# Patient Record
Sex: MALE | Race: BLACK OR AFRICAN AMERICAN | NOT HISPANIC OR LATINO | Employment: PART TIME | URBAN - METROPOLITAN AREA
[De-identification: names, ages, dates, MRNs, and addresses within clinical notes are randomized per-mention and may not be internally consistent; named-entity substitution may affect disease eponyms.]

---

## 2018-11-05 ENCOUNTER — HOSPITAL ENCOUNTER (EMERGENCY)
Facility: HOSPITAL | Age: 28
Discharge: HOME | End: 2018-11-05
Attending: EMERGENCY MEDICINE
Payer: MEDICAID

## 2018-11-05 VITALS
SYSTOLIC BLOOD PRESSURE: 130 MMHG | RESPIRATION RATE: 17 BRPM | TEMPERATURE: 98 F | WEIGHT: 240 LBS | BODY MASS INDEX: 36.37 KG/M2 | HEART RATE: 73 BPM | OXYGEN SATURATION: 99 % | HEIGHT: 68 IN | DIASTOLIC BLOOD PRESSURE: 71 MMHG

## 2018-11-05 DIAGNOSIS — R07.89 CHEST PAIN, NON-CARDIAC: Primary | ICD-10-CM

## 2018-11-05 PROCEDURE — 93010 ELECTROCARDIOGRAM REPORT: CPT | Mod: ,,, | Performed by: INTERNAL MEDICINE

## 2018-11-05 PROCEDURE — 93005 ELECTROCARDIOGRAM TRACING: CPT

## 2018-11-05 PROCEDURE — 25000003 PHARM REV CODE 250: Performed by: EMERGENCY MEDICINE

## 2018-11-05 PROCEDURE — 99284 EMERGENCY DEPT VISIT MOD MDM: CPT | Performed by: EMERGENCY MEDICINE

## 2018-11-05 RX ORDER — IBUPROFEN 600 MG/1
600 TABLET ORAL EVERY 6 HOURS PRN
Qty: 20 TABLET | Refills: 0 | Status: SHIPPED | OUTPATIENT
Start: 2018-11-05

## 2018-11-05 RX ORDER — CYCLOBENZAPRINE HCL 10 MG
10 TABLET ORAL 3 TIMES DAILY PRN
Qty: 15 TABLET | Refills: 0 | Status: SHIPPED | OUTPATIENT
Start: 2018-11-05 | End: 2018-11-10

## 2018-11-05 RX ORDER — IBUPROFEN 600 MG/1
600 TABLET ORAL
Status: COMPLETED | OUTPATIENT
Start: 2018-11-05 | End: 2018-11-05

## 2018-11-05 RX ADMIN — IBUPROFEN 600 MG: 600 TABLET ORAL at 08:11

## 2018-11-05 NOTE — DISCHARGE INSTRUCTIONS
Please return immediately if you get worse or if new problems develop.  Rest.  Please follow-up with your primary care doctor this week.  Rest.  You may see the doctor above.  Rest.

## 2018-11-05 NOTE — ED PROVIDER NOTES
Encounter Date: 11/5/2018    SCRIBE #1 NOTE: I, Sudhir Segal, am scribing for, and in the presence of,  Felix Valenzuela MD. I have scribed the following portions of the note - Other sections scribed: HPI, ROS.       History     Chief Complaint   Patient presents with    Chest Pain     chest pain since yesterday. left sided; worse with activity and deep breathing. pain comes and goes; feels like muscle spasms. denies any sob, n/v.      CC: Chest Pain    HPI: This is a 28 y.o. M who has no PMHx who presents to the ED for emergent evaluation of acute, intermittent, atraumatic left sided CP that began yesterday. He describes the CP as a sharp pain. He has no associated symptoms. Chest pain is exacerbated with taking a deep breath and with lying down on the left side. There are no alleviating factors. Pt denies fever, chills, ear pain, eye pain, sore throat, cough, SOB, leg swelling, abdominal pain, nausea, vomiting diarrhea, dysuria, back pain, rash, arm or leg problems, headache, or tobacco use.      The history is provided by the patient. No  was used.     Review of patient's allergies indicates:  No Known Allergies  History reviewed. No pertinent past medical history.  History reviewed. No pertinent surgical history.  History reviewed. No pertinent family history.  Social History     Tobacco Use    Smoking status: Never Smoker    Smokeless tobacco: Never Used   Substance Use Topics    Alcohol use: Yes     Frequency: Never     Comment: occasionally    Drug use: No     Review of Systems   Constitutional: Negative for chills and fever.   HENT: Negative for ear pain and sore throat.    Eyes: Negative for pain.   Respiratory: Negative for cough and shortness of breath.    Cardiovascular: Positive for chest pain. Negative for leg swelling.   Gastrointestinal: Negative for abdominal pain, diarrhea, nausea and vomiting.   Genitourinary: Negative for dysuria.   Musculoskeletal: Negative for back  pain.        (-) arm or leg problems   Skin: Negative for rash.   Neurological: Negative for headaches.       Physical Exam     Initial Vitals [11/05/18 0756]   BP Pulse Resp Temp SpO2   127/83 72 18 98.6 °F (37 °C) 96 %      MAP       --         Physical Exam  The patient was examined specifically for the following:   General:No significant distress, Good color, Warm and dry. Head and neck:Scalp atraumatic, Neck supple. Neurological:Appropriate conversation, Gross motor deficits. Eyes:Conjugate gaze, Clear corneas. ENT: No epistaxis. Cardiac: Regular rate and rhythm, Grossly normal heart tones. Pulmonary: Wheezing, Rales. Gastrointestinal: Abdominal tenderness, Abdominal distention. Musculoskeletal: Extremity deformity, Apparent pain with range of motion of the joints. Skin: Rash.   The findings on examination were normal.  The lungs are clear.  The heart tones are normal.  The abdomen is soft.  There is no evidence of respiratory distress or cardiopulmonary strain.  The chest is not tender there is no rash on the left chest.  The lungs are clear with equal breath sounds bilaterally.  Heart tones are normal.  The patient has regular rate and rhythm.  ED Course   Procedures  Labs Reviewed - No data to display  EKG Readings: (Independently Interpreted)   This patient is in a normal sinus rhythm with a heart rate of 74.  The p.r. QRS and QT intervals are normal.  There is some nonspecific ST segment changes that are most likely early repolarization.  There is no evidence of acute myocardial infarction or malignant arrhythmia.       Imaging Results          X-Ray Chest AP Portable (Final result)  Result time 11/05/18 08:36:43    Final result by Norberto Bob MD (11/05/18 08:36:43)                 Impression:      No acute intrathoracic processes.      Electronically signed by: Norberto Bob MD  Date:    11/05/2018  Time:    08:36             Narrative:    EXAMINATION:  XR CHEST AP PORTABLE    CLINICAL HISTORY:  chest  pain;    TECHNIQUE:  Single frontal view of the chest was performed.    COMPARISON:  None    FINDINGS:  Cardiomediastinal silhouette is within normal limits.  There is no tracheal abnormalities.  There are no acute lobar consolidations or pneumothorax or pulmonary vascular congestion or pleural effusions.  There are cardiac monitoring leads over the chest.  The visualized bony thorax is unremarkable.                                            Scribe Attestation:   Scribe #1: I performed the above scribed service and the documentation accurately describes the services I performed. I attest to the accuracy of the note.    Attending Attestation:           Physician Attestation for Scribe:  Physician Attestation Statement for Scribe #1: I, Felix Valenzuela MD, reviewed documentation, as scribed by Sudhir Segal in my presence, and it is both accurate and complete.                    Clinical Impression:   The encounter diagnosis was Chest pain, non-cardiac.                             Felix Valenzuela MD  11/07/18 0881

## 2018-11-05 NOTE — ED TRIAGE NOTES
"Pt arrived to the ED via personal transport with c/o chest pain. Pt states "pain started on yesterday and hurts more with movement like when he raises his arms up". Pt denies SOB, dizziness, N/V. Pt reports pain 9/10 on pain scale. Pt denies taking anything to relieve pain.  "

## 2019-07-03 ENCOUNTER — HOSPITAL ENCOUNTER (EMERGENCY)
Facility: HOSPITAL | Age: 29
Discharge: HOME | End: 2019-07-03
Attending: EMERGENCY MEDICINE
Payer: MEDICAID

## 2019-07-03 VITALS
SYSTOLIC BLOOD PRESSURE: 139 MMHG | TEMPERATURE: 98 F | WEIGHT: 240 LBS | DIASTOLIC BLOOD PRESSURE: 74 MMHG | BODY MASS INDEX: 35.55 KG/M2 | OXYGEN SATURATION: 97 % | RESPIRATION RATE: 18 BRPM | HEART RATE: 80 BPM | HEIGHT: 69 IN

## 2019-07-03 DIAGNOSIS — H60.502 ACUTE OTITIS EXTERNA OF LEFT EAR, UNSPECIFIED TYPE: Primary | ICD-10-CM

## 2019-07-03 DIAGNOSIS — H66.92 ACUTE OTITIS MEDIA, LEFT: ICD-10-CM

## 2019-07-03 PROCEDURE — 99284 EMERGENCY DEPT VISIT MOD MDM: CPT | Mod: 25

## 2019-07-03 PROCEDURE — 25000003 PHARM REV CODE 250: Performed by: NURSE PRACTITIONER

## 2019-07-03 RX ORDER — AMOXICILLIN 875 MG/1
875 TABLET, FILM COATED ORAL 2 TIMES DAILY
Qty: 14 TABLET | Refills: 0 | OUTPATIENT
Start: 2019-07-03 | End: 2020-01-08

## 2019-07-03 RX ORDER — NEOMYCIN SULFATE, POLYMYXIN B SULFATE AND HYDROCORTISONE 10; 3.5; 1 MG/ML; MG/ML; [USP'U]/ML
4 SUSPENSION/ DROPS AURICULAR (OTIC) 3 TIMES DAILY
Qty: 6 ML | Refills: 0 | Status: SHIPPED | OUTPATIENT
Start: 2019-07-03 | End: 2019-07-10

## 2019-07-03 RX ORDER — AMOXICILLIN 250 MG/1
1000 CAPSULE ORAL
Status: COMPLETED | OUTPATIENT
Start: 2019-07-03 | End: 2019-07-03

## 2019-07-03 RX ORDER — NAPROXEN 500 MG/1
500 TABLET ORAL
Status: COMPLETED | OUTPATIENT
Start: 2019-07-03 | End: 2019-07-03

## 2019-07-03 RX ORDER — NEOMYCIN SULFATE, POLYMYXIN B SULFATE AND HYDROCORTISONE 10; 3.5; 1 MG/ML; MG/ML; [USP'U]/ML
4 SUSPENSION/ DROPS AURICULAR (OTIC)
Status: COMPLETED | OUTPATIENT
Start: 2019-07-03 | End: 2019-07-03

## 2019-07-03 RX ADMIN — AMOXICILLIN 1000 MG: 250 CAPSULE ORAL at 04:07

## 2019-07-03 RX ADMIN — NAPROXEN 500 MG: 500 TABLET ORAL at 04:07

## 2019-07-03 RX ADMIN — NEOMYCIN SULFATE, POLYMYXIN B SULFATE AND HYDROCORTISONE 4 DROP: 10; 3.5; 1 SUSPENSION/ DROPS AURICULAR (OTIC) at 04:07

## 2019-07-03 NOTE — DISCHARGE INSTRUCTIONS
Please return to the Emergency Department for any new or worsening symptoms including: worsening or changes in pain, fever, chest pain, shortness of breath, loss of consciousness, dizziness, weakness, or any other concerns.     Please follow up with your Primary Care Doctor in 2-3 days for a recheck of your symptoms. If you do not have one, you may contact the one listed on your discharge paperwork or you may also call the [facility] Appointment Desk at [phone] to schedule an appointment with one.     Please take all medication as prescribed. Tylenol or Ibuprofen as needed for pain.

## 2019-07-03 NOTE — ED PROVIDER NOTES
Encounter Date: 7/3/2019       History     Chief Complaint   Patient presents with    Otalgia     reports having left ear pain x1 week denies draikaterin     CC: Left Ear Pain    HPI: Roly Candelaria, a 29 y.o. male presents to the ED a 1 week history of gradually worsening left ear pain. He reports swimming just prior to the onset of ear pain approximately 1 week ago. He reports the pain is constant. Worse with movement of the ear. He occasionally uses QTips. No recent antibiotic use. He reports using OTC treatments with no relif of symptoms. No drainage from the ear, fevers, coughs, or right ear pain.       The history is provided by the patient. No  was used.     Review of patient's allergies indicates:  No Known Allergies  History reviewed. No pertinent past medical history.  History reviewed. No pertinent surgical history.  History reviewed. No pertinent family history.  Social History     Tobacco Use    Smoking status: Never Smoker    Smokeless tobacco: Never Used   Substance Use Topics    Alcohol use: Yes     Frequency: Never     Comment: occasionally    Drug use: No     Review of Systems   Constitutional: Negative for fever.   HENT: Positive for ear pain. Negative for sore throat.         (-) Ear Drainage   Musculoskeletal: Negative for neck pain and neck stiffness.   Skin: Negative for rash and wound.   Neurological: Negative for headaches.   Psychiatric/Behavioral: Negative for confusion.       Physical Exam     Initial Vitals [07/03/19 1556]   BP Pulse Resp Temp SpO2   139/74 80 18 98.1 °F (36.7 °C) 97 %      MAP       --         Physical Exam    Nursing note and vitals reviewed.  Constitutional: He appears well-developed and well-nourished. He is not diaphoretic. He is cooperative.  Non-toxic appearance. He does not have a sickly appearance. He does not appear ill. No distress.   HENT:   Head: Normocephalic and atraumatic.   Right Ear: Tympanic membrane and external ear normal.  No drainage. No mastoid tenderness. Tympanic membrane is not erythematous.   Left Ear: No drainage. No mastoid tenderness. Tympanic membrane is erythematous.   Mouth/Throat: Oropharynx is clear and moist.   Left Ear: Canal erythematous, with swelling and dry flaky discharge. Canal swelling greatest near the proximal canal. TM visualized with insertion of the speculum into the canal. and is erythematous and injected. Tragal tenderness and pain with movement. Right Ear: Normal canal and TM. No tragal or movement pain.    Eyes: Conjunctivae and EOM are normal.   Neck: Trachea normal, normal range of motion and phonation normal. Neck supple. Normal range of motion present. No neck rigidity.   Cardiovascular: Normal rate and regular rhythm.   Pulmonary/Chest: No tachypnea and no bradypnea. No respiratory distress. He has no wheezes. He has no rhonchi. He has no rales.   Musculoskeletal: Normal range of motion.   Lymphadenopathy:     He has no cervical adenopathy.        Right cervical: No superficial cervical adenopathy present.       Left cervical: No superficial cervical adenopathy present.   Neurological: He is alert and oriented to person, place, and time. GCS eye subscore is 4. GCS verbal subscore is 5. GCS motor subscore is 6.   Skin: Skin is warm and dry. No rash noted.         ED Course   Procedures  Labs Reviewed - No data to display       Imaging Results    None                APC / Resident Notes:   This is an evaluation of a 29 y.o. male that presents to the Emergency Department for Left Ear Pain. The patient is a non-toxic, afebrile, and well appearing male. On physical exam, there is left canal edema, erythema, and discharge. The TM is intact and has erythema. There is tragal pain and movement pain. There is no no mastoid tenderness or erythema.  Right ear with no TM or canal erythema. There is no tragal or canal tenderness\pain and no mastoid tenderness or erythema. Vital Signs Reassuring. Given the canal  erythema with swelling and TM erythema, will cover for OE and OM.     Given the above findings, my overall impression is otitis media/externa. Given the above findings, I do not think the patient has meningitis, mastoiditis, perforated TM, foreign body, or systemic bacterial infection.    The patient will be discharged home with Amoxil and Cortisporin. Additional home care recommendations include OE discharge instructions. The diagnosis, treatment plan, instructions for follow-up and reevaluation with his PCP as well as ED return precautions have been discussed with the patient and he has verbalized an understanding of the information. All questions or concerns have been addressed. ESTHER Ham, SHANELLP-C                  Clinical Impression:       ICD-10-CM ICD-9-CM   1. Acute otitis externa of left ear, unspecified type H60.502 380.10   2. Acute otitis media, left H66.92 382.9         Disposition:   Disposition: Discharged  Condition: Stable                        JAS Aldana  07/03/19 1626

## 2019-07-03 NOTE — ED TRIAGE NOTES
Pt comes to ED with left ear pain.  Pt reports that the pain started about a week ago.  Pt reports attempting over the counter remedies, and it didn't work.  At this time, pt is alert and oriented x4, VSS and he appears to be in no acute distress.

## 2020-01-08 ENCOUNTER — HOSPITAL ENCOUNTER (EMERGENCY)
Facility: HOSPITAL | Age: 30
Discharge: HOME | End: 2020-01-08
Attending: EMERGENCY MEDICINE
Payer: MEDICAID

## 2020-01-08 VITALS
HEART RATE: 84 BPM | WEIGHT: 240 LBS | OXYGEN SATURATION: 98 % | RESPIRATION RATE: 18 BRPM | TEMPERATURE: 97 F | BODY MASS INDEX: 35.55 KG/M2 | SYSTOLIC BLOOD PRESSURE: 124 MMHG | HEIGHT: 69 IN | DIASTOLIC BLOOD PRESSURE: 80 MMHG

## 2020-01-08 DIAGNOSIS — H66.91 RIGHT OTITIS MEDIA, UNSPECIFIED OTITIS MEDIA TYPE: Primary | ICD-10-CM

## 2020-01-08 PROCEDURE — 99283 EMERGENCY DEPT VISIT LOW MDM: CPT | Mod: 25

## 2020-01-08 PROCEDURE — 25000003 PHARM REV CODE 250: Performed by: NURSE PRACTITIONER

## 2020-01-08 PROCEDURE — 69210 REMOVE IMPACTED EAR WAX UNI: CPT | Mod: RT

## 2020-01-08 RX ORDER — AMOXICILLIN AND CLAVULANATE POTASSIUM 875; 125 MG/1; MG/1
1 TABLET, FILM COATED ORAL 2 TIMES DAILY
Qty: 14 TABLET | Refills: 0 | Status: SHIPPED | OUTPATIENT
Start: 2020-01-09 | End: 2020-01-16

## 2020-01-08 RX ORDER — AMOXICILLIN AND CLAVULANATE POTASSIUM 875; 125 MG/1; MG/1
1 TABLET, FILM COATED ORAL
Status: COMPLETED | OUTPATIENT
Start: 2020-01-08 | End: 2020-01-08

## 2020-01-08 RX ORDER — IBUPROFEN 400 MG/1
800 TABLET ORAL
Status: COMPLETED | OUTPATIENT
Start: 2020-01-08 | End: 2020-01-08

## 2020-01-08 RX ADMIN — AMOXICILLIN AND CLAVULANATE POTASSIUM 1 TABLET: 875; 125 TABLET, FILM COATED ORAL at 08:01

## 2020-01-08 RX ADMIN — IBUPROFEN 800 MG: 400 TABLET, FILM COATED ORAL at 08:01

## 2020-01-08 RX ADMIN — CARBAMIDE PEROXIDE 6.5% 5 DROP: 6.5 LIQUID AURICULAR (OTIC) at 08:01

## 2020-01-09 NOTE — DISCHARGE INSTRUCTIONS

## 2020-01-09 NOTE — ED PROVIDER NOTES
Encounter Date: 1/8/2020    SCRIBE #1 NOTE: I, Lilly Lane, am scribing for, and in the presence of,  Radha Marinelli NP. I have scribed the following portions of the note - Other sections scribed: HPI and ROS.       History     Chief Complaint   Patient presents with    Otalgia     Patient reports right ear pain X 2 weeks. Patient reports being seen at urgent care and receiving ear drops. Patient states pain has increased with usage. Rates pain 10/10     CC: Otalgia    HPI: This is a 30 y.o. male with no PMHx. He presents to the Emergency Department with a cc of right otalgia x2 weeks. The patient was initially seen at an urgent care, 2 days ago, for the complaint where he was prescribed unspecified eardrops. He reports to the ED today, due to the ongoing complaint with no relief from the ear drops. The patient reports associated rhinorrhea and cough. He denies any fever, chills, or sore throat. Tylenol was taken in attempts to alleviate the complaint, with no relief. There were no worsening factors reported. Patient reports no prior history of similar symptoms. NKDA.        The history is provided by the patient.     Review of patient's allergies indicates:  No Known Allergies  History reviewed. No pertinent past medical history.  History reviewed. No pertinent surgical history.  History reviewed. No pertinent family history.  Social History     Tobacco Use    Smoking status: Never Smoker    Smokeless tobacco: Never Used   Substance Use Topics    Alcohol use: Yes     Frequency: Never     Comment: occasionally    Drug use: No     Review of Systems   Constitutional: Negative for chills and fever.   HENT: Positive for ear pain and rhinorrhea. Negative for congestion and sore throat.    Eyes: Negative for visual disturbance.   Respiratory: Positive for cough. Negative for shortness of breath.    Cardiovascular: Negative for chest pain.   Gastrointestinal: Negative for abdominal pain, diarrhea, nausea and  vomiting.   Genitourinary: Negative for dysuria and hematuria.   Musculoskeletal: Negative for back pain and neck pain.   Skin: Negative for rash.   Neurological: Negative for headaches.   Psychiatric/Behavioral: Negative for confusion.       Physical Exam     Initial Vitals [01/08/20 1957]   BP Pulse Resp Temp SpO2   (!) 140/77 82 16 98.4 °F (36.9 °C) 99 %      MAP       --         Physical Exam    Vitals reviewed.  Constitutional: He appears well-developed and well-nourished. He is not diaphoretic.  Non-toxic appearance. He does not have a sickly appearance. He does not appear ill. No distress.   HENT:   Head: Normocephalic and atraumatic.   Right Ear: External ear and ear canal normal. Tympanic membrane is erythematous and bulging. A middle ear effusion is present.   Left Ear: Hearing, tympanic membrane, external ear and ear canal normal.   Nose: Nose normal.   Mouth/Throat: Uvula is midline and oropharynx is clear and moist. No oropharyngeal exudate.   Right TM is erythematous and bulging.   Eyes: Conjunctivae and EOM are normal. Pupils are equal, round, and reactive to light. Right eye exhibits no discharge. Left eye exhibits no discharge.   Neck: Normal range of motion. Neck supple.   Cardiovascular: Normal rate, regular rhythm, normal heart sounds and intact distal pulses.   Pulmonary/Chest: No respiratory distress.   Musculoskeletal: Normal range of motion.   Neurological: He is alert and oriented to person, place, and time. GCS eye subscore is 4. GCS verbal subscore is 5. GCS motor subscore is 6.   Skin: Skin is warm, dry and intact. No rash noted. No erythema.   Psychiatric: He has a normal mood and affect. Thought content normal.         ED Course   Ear Wax Removal  Date/Time: 1/8/2020 8:58 PM  Performed by: Radha Marinelli NP  Authorized by: Darian Meyer MD   Ceruminolytics applied prior to the procedure.  Medication Used: Debrox.  Location details: right ear  Procedure type: curette Cerumen  Removal Results: Cerumen completely removed.  Patient tolerance: Patient tolerated the procedure well with no immediate complications        Labs Reviewed - No data to display       Imaging Results    None          Medical Decision Making:   ED Management:  This is an evaluation of a 30 y.o. male that presents to the Emergency Department for Right ear pain. The patient is a non-toxic, afebrile, and well appearing male. On physical exam, unable to visualize right TM due to cerumen impaction.  Cerumen was removed per the procedure note without difficulty.  Right TM is erythematous and bulging with a middle ear effusion.. There is no tragal or canal tenderness\pain and no mastoid tenderness or erythema.      Vital Signs Reassuring.    Given the above findings, my overall impression is otitis media. Given the above findings, I do not think the patient has meningitis, OE, mastoiditis, perforated TM, foreign body, or systemic bacterial infection.    The patient will be discharged home with Augmentin. Additional home care recommendations include ibuprofen and Tylenol for pain. The diagnosis, treatment plan, instructions for follow-up and reevaluation with his PCP as well as ED return precautions have been discussed with the patient and he has verbalized an understanding of the information. All questions or concerns have been addressed.               Scribe Attestation:   Scribe #1: I performed the above scribed service and the documentation accurately describes the services I performed. I attest to the accuracy of the note.                          Clinical Impression:     1. Right otitis media, unspecified otitis media type            Disposition:   Disposition: Discharged  Condition: Stable        Scribe attestation: IWILLARD, personally performed the services described in this documentation. All medical record entries made by the scribe were at my direction and in my presence.  I have reviewed the chart and agree  that the record reflects my personal performance and is accurate and complete.               Radha Marinelli, MATTHEW  01/08/20 2059

## 2020-01-09 NOTE — ED TRIAGE NOTES
Pt presents to ED with c/o right ear ache x 2 weeks. Pt reports being seen at local urgent care last week and dx with swimmer's ear; reports being prescribed ear drops with no relief. NAD noted.

## 2022-01-11 ENCOUNTER — LAB VISIT (OUTPATIENT)
Dept: PRIMARY CARE CLINIC | Facility: CLINIC | Age: 32
End: 2022-01-11
Payer: MEDICAID

## 2022-01-11 DIAGNOSIS — Z20.822 CONTACT WITH AND (SUSPECTED) EXPOSURE TO COVID-19: ICD-10-CM

## 2022-01-11 LAB
CTP QC/QA: YES
SARS-COV-2 AG RESP QL IA.RAPID: NEGATIVE

## 2022-01-11 PROCEDURE — 87811 SARS-COV-2 COVID19 W/OPTIC: CPT

## 2022-01-12 ENCOUNTER — OFFICE VISIT (OUTPATIENT)
Dept: URGENT CARE | Facility: CLINIC | Age: 32
End: 2022-01-12
Payer: MEDICAID

## 2022-01-12 VITALS
HEART RATE: 67 BPM | BODY MASS INDEX: 35.55 KG/M2 | SYSTOLIC BLOOD PRESSURE: 141 MMHG | DIASTOLIC BLOOD PRESSURE: 70 MMHG | HEIGHT: 69 IN | OXYGEN SATURATION: 97 % | TEMPERATURE: 98 F | WEIGHT: 240 LBS | RESPIRATION RATE: 15 BRPM

## 2022-01-12 DIAGNOSIS — Z20.822 LAB TEST NEGATIVE FOR COVID-19 VIRUS: ICD-10-CM

## 2022-01-12 DIAGNOSIS — Z76.89 ENCOUNTER TO ESTABLISH CARE: ICD-10-CM

## 2022-01-12 DIAGNOSIS — J34.9 SINUS PROBLEM: ICD-10-CM

## 2022-01-12 DIAGNOSIS — J30.9 ALLERGIC RHINITIS, UNSPECIFIED SEASONALITY, UNSPECIFIED TRIGGER: Primary | ICD-10-CM

## 2022-01-12 LAB
CTP QC/QA: YES
SARS-COV-2 RDRP RESP QL NAA+PROBE: NEGATIVE

## 2022-01-12 PROCEDURE — 3077F PR MOST RECENT SYSTOLIC BLOOD PRESSURE >= 140 MM HG: ICD-10-PCS | Mod: CPTII,S$GLB,, | Performed by: FAMILY MEDICINE

## 2022-01-12 PROCEDURE — 3077F SYST BP >= 140 MM HG: CPT | Mod: CPTII,S$GLB,, | Performed by: FAMILY MEDICINE

## 2022-01-12 PROCEDURE — 3008F BODY MASS INDEX DOCD: CPT | Mod: CPTII,S$GLB,, | Performed by: FAMILY MEDICINE

## 2022-01-12 PROCEDURE — 1160F RVW MEDS BY RX/DR IN RCRD: CPT | Mod: CPTII,S$GLB,, | Performed by: FAMILY MEDICINE

## 2022-01-12 PROCEDURE — 3078F DIAST BP <80 MM HG: CPT | Mod: CPTII,S$GLB,, | Performed by: FAMILY MEDICINE

## 2022-01-12 PROCEDURE — U0002: ICD-10-PCS | Mod: QW,S$GLB,, | Performed by: FAMILY MEDICINE

## 2022-01-12 PROCEDURE — 99203 PR OFFICE/OUTPT VISIT, NEW, LEVL III, 30-44 MIN: ICD-10-PCS | Mod: S$GLB,,, | Performed by: FAMILY MEDICINE

## 2022-01-12 PROCEDURE — 1159F PR MEDICATION LIST DOCUMENTED IN MEDICAL RECORD: ICD-10-PCS | Mod: CPTII,S$GLB,, | Performed by: FAMILY MEDICINE

## 2022-01-12 PROCEDURE — 1160F PR REVIEW ALL MEDS BY PRESCRIBER/CLIN PHARMACIST DOCUMENTED: ICD-10-PCS | Mod: CPTII,S$GLB,, | Performed by: FAMILY MEDICINE

## 2022-01-12 PROCEDURE — 3078F PR MOST RECENT DIASTOLIC BLOOD PRESSURE < 80 MM HG: ICD-10-PCS | Mod: CPTII,S$GLB,, | Performed by: FAMILY MEDICINE

## 2022-01-12 PROCEDURE — 1159F MED LIST DOCD IN RCRD: CPT | Mod: CPTII,S$GLB,, | Performed by: FAMILY MEDICINE

## 2022-01-12 PROCEDURE — 99203 OFFICE O/P NEW LOW 30 MIN: CPT | Mod: S$GLB,,, | Performed by: FAMILY MEDICINE

## 2022-01-12 PROCEDURE — 3008F PR BODY MASS INDEX (BMI) DOCUMENTED: ICD-10-PCS | Mod: CPTII,S$GLB,, | Performed by: FAMILY MEDICINE

## 2022-01-12 PROCEDURE — U0002 COVID-19 LAB TEST NON-CDC: HCPCS | Mod: QW,S$GLB,, | Performed by: FAMILY MEDICINE

## 2022-01-12 RX ORDER — CETIRIZINE HYDROCHLORIDE 10 MG/1
10 TABLET ORAL DAILY
Qty: 30 TABLET | Refills: 0 | Status: SHIPPED | OUTPATIENT
Start: 2022-01-12 | End: 2022-02-11

## 2022-01-12 RX ORDER — OLOPATADINE HYDROCHLORIDE 2 MG/ML
1 SOLUTION/ DROPS OPHTHALMIC DAILY
Qty: 2 ML | Refills: 0 | Status: SHIPPED | OUTPATIENT
Start: 2022-01-12 | End: 2023-01-12

## 2022-01-12 RX ORDER — FLUTICASONE PROPIONATE 50 MCG
1 SPRAY, SUSPENSION (ML) NASAL DAILY
Qty: 16 G | Refills: 0 | Status: SHIPPED | OUTPATIENT
Start: 2022-01-12

## 2022-01-12 NOTE — PATIENT INSTRUCTIONS
General Discharge Instructions   PLEASE READ YOUR DISCHARGE INSTRUCTIONS ENTIRELY AS IT CONTAINS IMPORTANT INFORMATION.  If you were prescribed a narcotic or controlled medication, do not drive or operate heavy equipment or machinery while taking these medications.  If you were prescribed antibiotics, please take them to completion.  You must understand that you've received an Urgent Care treatment only and that you may be released before all your medical problems are known or treated. You, the patient, will arrange for follow up care as instructed.    OVER THE COUNTER RECOMMENDATIONS/SUGGESTIONS.    Make sure to stay well hydrated.    Use Nasal Saline to mechanically move any post nasal drip from your eustachian tube or from the back of your throat.    Use warm salt water gargles to ease your throat pain. Warm salt water gargles as needed for sore throat- 1/2 tsp salt to 1 cup warm water, gargle as desired.    Use an antihistamine such as Claritin, Zyrtec or Allegra to dry you out.    Use pseudoephedrine (behind the counter) to decongest. Pseudoephedrine 30 mg up to 240 mg /day. It can raise your blood pressure and give you palpitations.    Use mucinex (guaifenesin) to break up mucous up to 2400mg/day to loosen any mucous.    The mucinex DM pill has a cough suppressant that can be sedating. It can be used at night to stop the tickle at the back of your throat.    You can use Mucinex D (it has guaifenesin and a high dose of pseudoephedrine) in the mornings to help decongest.    Use Afrin in each nare for no longer than 3 days, as it is addictive. It can also dry out your mucous membranes and cause elevated blood pressure. This is especially useful if you are flying.    Use Flonase 1-2 sprays/nostril per day. It is a local acting steroid nasal spray, if you develop a bloody nose, stop using the medication immediately.    Sometimes Nyquil at night is beneficial to help you get some rest, however it is sedating and it  does have an antihistamine, and tylenol.    Honey is a natural cough suppressant that can be used.    Tylenol up to 4,000 mg a day is safe for short periods and can be used for body aches, pain, and fever. However in high doses and prolonged use it can cause liver irritation.    Ibuprofen is a non-steroidal anti-inflammatory that can be used for body aches, pain, and fever.However it can also cause stomach irritation if over used.     Follow up with your PCP or specialty clinic as instructed in the next 2-3 days if not improved or as needed. You can call [phone] to schedule an appointment with appropriate provider.      If you condition worsens, we recommend that you receive another evaluation at the emergency room immediately or contact your primary medical clinic's after hours call service to discuss your concerns.      Please return here or go to the Emergency Department for any concerns or worsening condition.   You can also call [phone] to schedule an appointment with the appropriate provider.    Please return here or go to the Emergency Department for any concerns or worsening of condition.    Thank you for choosing [facility]!    Our goal in the Urgent Care is to always provide outstanding medical care. You may receive a survey by mail or e-mail in the next week regarding your experience today. We would greatly appreciate you completing and returning the survey. Your feedback provides us with a way to recognize our staff who provide very good care, and it helps us learn how to improve when your experience was below our aspiration of excellence.      We appreciate you trusting us with your medical care. We hope you feel better soon. We will be happy to take care of you for all of your future medical needs.    Sincerely,    GREGG Ram  NEGATIVE COVID TEST  -You have tested negative for COVID-19 today.  If you did not have a close exposure (as defined below) you can return to  "your normal daily activities to include social distancing, wearing a mask and frequent handwashing.  -A "close exposure" is defined as anyone who has had an exposure (masked or unmasked) to a known COVID -19 positive person within 6 ft for longer than 15 minutes. If your exposure meets this definition, you are required by CDC guidelines to quarantine for at least 7-10 days from time of exposure.  -The CDC states that a test can be performed for an asymptomatic patient (someone who does not have any symptoms) after a close exposure, and that a test should be done if you develop symptoms after a close exposure as described above.  -Specifically, you can test at day 5 or later if asymptomatic in order to get released from quarantine on day 7 or later.  If you develop symptoms sooner, you should test when your symptoms start.  -If you developed symptoms since the exposure, and your test was negative today and less than 5 days from your exposure, you still have to quarantine for 7-10 days from the date of the exposure.  -The 7-10 day quarantine begins from the day you were exposed, not the day of your test.  For example, if your exposure was on a Monday, and you waited until Friday of the same week to get tested and it was negative, your 7-10 day quarantine begins from that Monday, not the Friday you tested negative.  -Please note, if you decide to test as an asymptomatic during your quarantine and you are positive, you will be restarting your quarantine and moving from a possible 10 day quarantine (if you do not test), to a 11 day or greater quarantine.  When to Seek Emergency Medical Attention  Look for emergency warning signs* for COVID-19. If someone is showing any of these signs, seek emergency medical care immediately:    Trouble breathing  Persistent pain or pressure in the chest  New confusion  Inability to wake or stay awake  Pale, gray, or blue-colored skin, lips, or nail beds, depending on skin tone  *This " "list is not all possible symptoms. Please call your medical provider for any other symptoms that are severe or concerning to you.    Call 911 or call ahead to your local emergency facility: Notify the  that you are seeking care for someone who has or may have COVID-19.  Patient Education       Seasonal Allergies in Adults   The Basics   Written by the doctors and editors at [facility]   What are seasonal allergies? -- Seasonal allergies are a group of conditions that can cause sneezing, a stuffy or runny nose, and itchy eyes. Seasonal allergies are sometimes called "hay fever."  Symptoms occur only at certain times of the year. Most seasonal allergies are caused by:  · Pollens from trees, grasses, or weeds (figure 1)  · Mold spores, which grow when the weather is humid, wet, or damp  Normally, people breathe in these substances without a problem. When a person has a seasonal allergy, their immune system acts as if the substance is harmful to the body. This causes symptoms.  Many people first get seasonal allergies when they are children or young adults. Seasonal allergies are lifelong, but symptoms can get better or worse over time. Seasonal allergies sometimes run in families.  Some people have symptoms like those of seasonal allergies, but their symptoms last all year. Year-round symptoms are usually caused by:  · Insects, such as dust mites and cockroaches  · Animals, such as cats and dogs  · Mold spores  What are the symptoms of seasonal allergies? -- Symptoms of seasonal allergies can include:  · Stuffy nose, runny nose, or sneezing a lot  · Itchy or red eyes  · Sore throat, or itching of the throat or ears  · Waking up at night or trouble sleeping, which can lead to feeling tired during the day  Is there a test for seasonal allergies? -- Yes. Your doctor will ask about your symptoms and do an exam. They might order other tests, such as allergy skin testing, which can help the doctor figure out what you " are allergic to. During a skin test, a doctor will put a drop of the substance you might be allergic to on your skin, and make a tiny prick in the skin. Then, they will watch your skin to see if it turns red and bumpy.  How are seasonal allergies treated? -- People with seasonal allergies might use one or more of the following treatments to help reduce their symptoms:  · Nose rinses - Rinsing out the nose with salt water cleans the inside of the nose and gets rid of pollen in the nose. Different devices can be used to rinse the nose.  · Steroid nose sprays - Doctors often recommend these sprays first, because they are the best treatment for stuffy nose. Many of these sprays are available without a prescription. (Steroid nose sprays do not contain the same steroids that some athletes take illegally). Steroid nose sprays work best if you use them every day, and it can take a few days for them to work fully. Steroid nose sprays are more effective than other allergy medicines for stuffy nose and post-nasal drip (which is when mucus runs down the back of your throat).  · Antihistamines - These medicines help stop itching, sneezing, and runny nose symptoms. They don't treat stuffy nose as well as steroid nose sprays. Some antihistamines can make people feel tired.  · Antihistamine eye drops - These medicines are available without a prescription. They can help with eyes that feel itchy or gritty.  · Decongestants - These medicines can reduce stuffy nose symptoms. People with certain health problems, such as high blood pressure, should not take decongestants. Also, people should not use decongestant nose sprays for more than 3 days in a row. Using these nose sprays for more than 3 days in a row can make symptoms worse.   · Allergy shots - Some people with seasonal allergies choose to get allergy shots. Usually, allergy shots are given every week or month by an allergy doctor. They contain tiny amounts of allergens, such as  pollen. Many people find that this treatment reduces their symptoms, but it can take months to work.  · Allergy pills (under the tongue) - For some types of pollen allergies, there are pills that work much like allergy shots. These pills need to be prescribed by a doctor. They are made to dissolve under the tongue. They are taken every day for several months of the year.  Talk with your doctor or nurse about the benefits and downsides of the different treatments. The right treatment for you will depend a lot on your symptoms and other health problems. It is also important to talk with your doctor or nurse about when and how to use your medicines.  Can seasonal allergy symptoms be prevented? -- Yes. If you get symptoms at the same time every year, talk with your doctor or nurse. Some people can prevent symptoms by starting their medicine a week or 2 before that time of the year.  You can also help prevent symptoms by avoiding the things you are allergic to. For example, people who are allergic to pollen can:  · Stay inside during the times of the year when they have symptoms  · Keep car and house windows closed, and use air conditioning instead  · Take a shower before bed to rinse pollen off their hair and skin  · Wear a dust mask if they need to be outside  What if I want to get pregnant? -- If you want to get pregnant, talk with your doctor about which medicines are safe for pregnant women to take. Seasonal allergy symptoms can get worse, get better, or stay the same in pregnant women.  All topics are updated as new evidence becomes available and our peer review process is complete.  This topic retrieved from [email or web address] on: Sep 21, 2021.  Topic 41844 Version 12.0  Release: 29.4.2 - C29.263  © 2021 UpToDate, Inc. and/or its affiliates. All rights reserved.  figure 1: Common causes of seasonal allergies     Graphic 88282 Version 3.0    Consumer Information Use and Disclaimer   This information is not specific medical  advice and does not replace information you receive from your health care provider. This is only a brief summary of general information. It does NOT include all information about conditions, illnesses, injuries, tests, procedures, treatments, therapies, discharge instructions or life-style choices that may apply to you. You must talk with your health care provider for complete information about your health and treatment options. This information should not be used to decide whether or not to accept your health care provider's advice, instructions or recommendations. Only your health care provider has the knowledge and training to provide advice that is right for you. The use of this information is governed by the [employer] End User License Agreement, available at https://www.[employer]To/en/solutions/[employer]/about/tara.The use of [employer] content is governed by the [employer] Terms of Use. ©2021 UpToDate, Inc. All rights reserved.  Copyright   © 2021 Up[employer]Date, Inc. and/or its affiliates. All rights reserved.        Access Navigator team who handles Medicaid referrals INTO [facility]. The main line for that team (for your referrals into [facility]) is [phone] Access Line - helps Medicaid patients to find Medicaid care OUTSIDE of [facility]. Medicaid Access Line contact is: [phone]?  [email or web address].[address] [place] [email or web address] - [phone] - a community line that can help refer

## 2022-01-12 NOTE — PROGRESS NOTES
"Subjective:       Patient ID: Roly Candelaria is a 32 y.o. male.    Vitals:  height is 5' 9" (1.753 m) and weight is 108.9 kg (240 lb). His temporal temperature is 98.2 °F (36.8 °C). His blood pressure is 141/70 (abnormal) and his pulse is 67. His respiration is 15 and oxygen saturation is 97%.     Chief Complaint: Sinus Problem    Pt has been having symptoms for the pass few weeks. Pt did take medications with a mild relief. Pt is Covid vaccinated.   Provider note begins below:  Pt reports he has been having itchy eyes, and runny nose since around NICKIE. Fully cv vax with booster. No fever or chills. No cough or SOB. No GI related symptoms, including, N/v/D or constipation. No anosmia or ageusia. He has been taking zyrtec, dayquil and nyquil. Runny nose is intermittent and itchy eyes before bed.       Sinus Problem  This is a recurrent problem. The current episode started 1 to 4 weeks ago. The problem is unchanged. There has been no fever. His pain is at a severity of 8/10. The pain is moderate. Associated symptoms include sinus pressure and sneezing. Pertinent negatives include no chills, congestion, coughing, diaphoresis, ear pain, headaches, hoarse voice, neck pain, shortness of breath, sore throat or swollen glands. Past treatments include oral decongestants. The treatment provided mild relief.       Constitution: Negative for activity change, appetite change, chills, sweating, fatigue, fever, unexpected weight change and generalized weakness.   HENT: Positive for sinus pressure. Negative for ear pain, congestion, sinus pain, sore throat, trouble swallowing and voice change.    Neck: Negative for neck pain and neck stiffness.   Cardiovascular: Negative for chest pain.   Eyes: Positive for eye itching and blurred vision. Negative for eye trauma, foreign body in eye, eye discharge, eye pain, eye redness, photophobia, vision loss, double vision and eyelid swelling.   Respiratory: Negative for cough and shortness " of breath.    Allergic/Immunologic: Positive for sneezing. Negative for environmental allergies, seasonal allergies, recurrent sinus infections and itching.   Neurological: Negative for headaches.       Objective:      Physical Exam   Constitutional: He is oriented to person, place, and time. He appears well-developed and well-nourished.  Non-toxic appearance. He does not appear ill. No distress.   HENT:   Head: Normocephalic and atraumatic.   Ears:   Right Ear: External ear normal.   Left Ear: External ear normal.   Nose: Mucosal edema present. No rhinorrhea.   Mouth/Throat: Uvula is midline, oropharynx is clear and moist and mucous membranes are normal. No trismus in the jaw. No uvula swelling. No oropharyngeal exudate, posterior oropharyngeal edema, posterior oropharyngeal erythema, tonsillar abscesses or cobblestoning. No tonsillar exudate.   Eyes: Conjunctivae, EOM and lids are normal. Pupils are equal, round, and reactive to light.      extraocular movement intact   Neck: Trachea normal and phonation normal. Neck supple.   Cardiovascular: S1 normal and S2 normal.   Pulmonary/Chest: Effort normal and breath sounds normal.   Musculoskeletal: Normal range of motion.         General: Normal range of motion.   Neurological: He is alert and oriented to person, place, and time.   Skin: Skin is warm, dry, intact and not diaphoretic.   Psychiatric: He has a normal mood and affect. His speech is normal and behavior is normal. Judgment and thought content normal. Cognition and memory  Nursing note and vitals reviewed.        Assessment:       1. Allergic rhinitis, unspecified seasonality, unspecified trigger    2. Sinus problem    3. Lab test negative for COVID-19 virus    4. Encounter to establish care        Results for orders placed or performed in visit on 01/12/22   POCT COVID-19 Rapid Screening   Result Value Ref Range    POC Rapid COVID Negative Negative     Acceptable Yes        Plan:       COVID  negative, will try Zyrtec, and Pataday Flonase for allergy symptoms.  I have given him information on establishing with primary care as well.  Vss, nontoxic appearing.    Discussed results/diagnosis/plan with patient in clinic. Strict precautions given to patient to monitor for worsening signs and symptoms. Advised to follow up with PCP or specialist.    Explained side effects of medications prescribed with patient and informed him/her to discontinue use if he/she has any side effects and to inform UC or PCP if this occurs. All questions answered. Strict ED verses clinic return precautions stressed and given in depth. Advised if symptoms worsens of fail to improve he/she should go to the Emergency Room. Discharge and follow-up instructions given verbally/printed with the patient who expressed understanding and willingness to comply with my recommendations. Patient voiced understanding and in agreement with current treatment plan. Patient exits the exam room in no acute distress. Conversant and engaged during discharge discussion, verbalized understanding.      Allergic rhinitis, unspecified seasonality, unspecified trigger  -     cetirizine (ZYRTEC) 10 MG tablet; Take 1 tablet (10 mg total) by mouth once daily.  Dispense: 30 tablet; Refill: 0  -     fluticasone propionate (FLONASE) 50 mcg/actuation nasal spray; 1 spray (50 mcg total) by Each Nostril route once daily.  Dispense: 16 g; Refill: 0  -     olopatadine (PATADAY) 0.2 % Drop; Place 1 drop into both eyes once daily.  Dispense: 2 mL; Refill: 0    Sinus problem  -     POCT COVID-19 Rapid Screening    Lab test negative for COVID-19 virus    Encounter to establish care  -     Ambulatory referral/consult to Internal Medicine           Medical Decision Making:   Clinical Tests:   Lab Tests: Ordered and Reviewed       Patient Instructions   General Discharge Instructions   PLEASE READ YOUR DISCHARGE INSTRUCTIONS ENTIRELY AS IT CONTAINS IMPORTANT INFORMATION.  If you  were prescribed a narcotic or controlled medication, do not drive or operate heavy equipment or machinery while taking these medications.  If you were prescribed antibiotics, please take them to completion.  You must understand that you've received an Urgent Care treatment only and that you may be released before all your medical problems are known or treated. You, the patient, will arrange for follow up care as instructed.    OVER THE COUNTER RECOMMENDATIONS/SUGGESTIONS.    Make sure to stay well hydrated.    Use Nasal Saline to mechanically move any post nasal drip from your eustachian tube or from the back of your throat.    Use warm salt water gargles to ease your throat pain. Warm salt water gargles as needed for sore throat- 1/2 tsp salt to 1 cup warm water, gargle as desired.    Use an antihistamine such as Claritin, Zyrtec or Allegra to dry you out.    Use pseudoephedrine (behind the counter) to decongest. Pseudoephedrine 30 mg up to 240 mg /day. It can raise your blood pressure and give you palpitations.    Use mucinex (guaifenesin) to break up mucous up to 2400mg/day to loosen any mucous.    The mucinex DM pill has a cough suppressant that can be sedating. It can be used at night to stop the tickle at the back of your throat.    You can use Mucinex D (it has guaifenesin and a high dose of pseudoephedrine) in the mornings to help decongest.    Use Afrin in each nare for no longer than 3 days, as it is addictive. It can also dry out your mucous membranes and cause elevated blood pressure. This is especially useful if you are flying.    Use Flonase 1-2 sprays/nostril per day. It is a local acting steroid nasal spray, if you develop a bloody nose, stop using the medication immediately.    Sometimes Nyquil at night is beneficial to help you get some rest, however it is sedating and it does have an antihistamine, and tylenol.    Honey is a natural cough suppressant that can be used.    Tylenol up to 4,000 mg a  "day is safe for short periods and can be used for body aches, pain, and fever. However in high doses and prolonged use it can cause liver irritation.    Ibuprofen is a non-steroidal anti-inflammatory that can be used for body aches, pain, and fever.However it can also cause stomach irritation if over used.     Follow up with your PCP or specialty clinic as instructed in the next 2-3 days if not improved or as needed. You can call [phone] to schedule an appointment with appropriate provider.      If you condition worsens, we recommend that you receive another evaluation at the emergency room immediately or contact your primary medical clinic's after hours call service to discuss your concerns.      Please return here or go to the Emergency Department for any concerns or worsening condition.   You can also call [phone] to schedule an appointment with the appropriate provider.    Please return here or go to the Emergency Department for any concerns or worsening of condition.    Thank you for choosing [facility]!    Our goal in the Urgent Care is to always provide outstanding medical care. You may receive a survey by mail or e-mail in the next week regarding your experience today. We would greatly appreciate you completing and returning the survey. Your feedback provides us with a way to recognize our staff who provide very good care, and it helps us learn how to improve when your experience was below our aspiration of excellence.      We appreciate you trusting us with your medical care. We hope you feel better soon. We will be happy to take care of you for all of your future medical needs.    Sincerely,    GREGG Ram  NEGATIVE COVID TEST  -You have tested negative for COVID-19 today.  If you did not have a close exposure (as defined below) you can return to your normal daily activities to include social distancing, wearing a mask and frequent handwashing.  -A "close exposure" is " defined as anyone who has had an exposure (masked or unmasked) to a known COVID -19 positive person within 6 ft for longer than 15 minutes. If your exposure meets this definition, you are required by CDC guidelines to quarantine for at least 7-10 days from time of exposure.  -The CDC states that a test can be performed for an asymptomatic patient (someone who does not have any symptoms) after a close exposure, and that a test should be done if you develop symptoms after a close exposure as described above.  -Specifically, you can test at day 5 or later if asymptomatic in order to get released from quarantine on day 7 or later.  If you develop symptoms sooner, you should test when your symptoms start.  -If you developed symptoms since the exposure, and your test was negative today and less than 5 days from your exposure, you still have to quarantine for 7-10 days from the date of the exposure.  -The 7-10 day quarantine begins from the day you were exposed, not the day of your test.  For example, if your exposure was on a Monday, and you waited until Friday of the same week to get tested and it was negative, your 7-10 day quarantine begins from that Monday, not the Friday you tested negative.  -Please note, if you decide to test as an asymptomatic during your quarantine and you are positive, you will be restarting your quarantine and moving from a possible 10 day quarantine (if you do not test), to a 11 day or greater quarantine.  When to Seek Emergency Medical Attention  Look for emergency warning signs* for COVID-19. If someone is showing any of these signs, seek emergency medical care immediately:    Trouble breathing  Persistent pain or pressure in the chest  New confusion  Inability to wake or stay awake  Pale, gray, or blue-colored skin, lips, or nail beds, depending on skin tone  *This list is not all possible symptoms. Please call your medical provider for any other symptoms that are severe or concerning to  "you.    Call 911 or call ahead to your local emergency facility: Notify the  that you are seeking care for someone who has or may have COVID-19.  Patient Education       Seasonal Allergies in Adults   The Basics   Written by the doctors and editors at [facility]   What are seasonal allergies? -- Seasonal allergies are a group of conditions that can cause sneezing, a stuffy or runny nose, and itchy eyes. Seasonal allergies are sometimes called "hay fever."  Symptoms occur only at certain times of the year. Most seasonal allergies are caused by:  · Pollens from trees, grasses, or weeds (figure 1)  · Mold spores, which grow when the weather is humid, wet, or damp  Normally, people breathe in these substances without a problem. When a person has a seasonal allergy, their immune system acts as if the substance is harmful to the body. This causes symptoms.  Many people first get seasonal allergies when they are children or young adults. Seasonal allergies are lifelong, but symptoms can get better or worse over time. Seasonal allergies sometimes run in families.  Some people have symptoms like those of seasonal allergies, but their symptoms last all year. Year-round symptoms are usually caused by:  · Insects, such as dust mites and cockroaches  · Animals, such as cats and dogs  · Mold spores  What are the symptoms of seasonal allergies? -- Symptoms of seasonal allergies can include:  · Stuffy nose, runny nose, or sneezing a lot  · Itchy or red eyes  · Sore throat, or itching of the throat or ears  · Waking up at night or trouble sleeping, which can lead to feeling tired during the day  Is there a test for seasonal allergies? -- Yes. Your doctor will ask about your symptoms and do an exam. They might order other tests, such as allergy skin testing, which can help the doctor figure out what you are allergic to. During a skin test, a doctor will put a drop of the substance you might be allergic to on your skin, and make " a tiny prick in the skin. Then, they will watch your skin to see if it turns red and bumpy.  How are seasonal allergies treated? -- People with seasonal allergies might use one or more of the following treatments to help reduce their symptoms:  · Nose rinses - Rinsing out the nose with salt water cleans the inside of the nose and gets rid of pollen in the nose. Different devices can be used to rinse the nose.  · Steroid nose sprays - Doctors often recommend these sprays first, because they are the best treatment for stuffy nose. Many of these sprays are available without a prescription. (Steroid nose sprays do not contain the same steroids that some athletes take illegally). Steroid nose sprays work best if you use them every day, and it can take a few days for them to work fully. Steroid nose sprays are more effective than other allergy medicines for stuffy nose and post-nasal drip (which is when mucus runs down the back of your throat).  · Antihistamines - These medicines help stop itching, sneezing, and runny nose symptoms. They don't treat stuffy nose as well as steroid nose sprays. Some antihistamines can make people feel tired.  · Antihistamine eye drops - These medicines are available without a prescription. They can help with eyes that feel itchy or gritty.  · Decongestants - These medicines can reduce stuffy nose symptoms. People with certain health problems, such as high blood pressure, should not take decongestants. Also, people should not use decongestant nose sprays for more than 3 days in a row. Using these nose sprays for more than 3 days in a row can make symptoms worse.   · Allergy shots - Some people with seasonal allergies choose to get allergy shots. Usually, allergy shots are given every week or month by an allergy doctor. They contain tiny amounts of allergens, such as pollen. Many people find that this treatment reduces their symptoms, but it can take months to work.  · Allergy pills (under  the tongue) - For some types of pollen allergies, there are pills that work much like allergy shots. These pills need to be prescribed by a doctor. They are made to dissolve under the tongue. They are taken every day for several months of the year.  Talk with your doctor or nurse about the benefits and downsides of the different treatments. The right treatment for you will depend a lot on your symptoms and other health problems. It is also important to talk with your doctor or nurse about when and how to use your medicines.  Can seasonal allergy symptoms be prevented? -- Yes. If you get symptoms at the same time every year, talk with your doctor or nurse. Some people can prevent symptoms by starting their medicine a week or 2 before that time of the year.  You can also help prevent symptoms by avoiding the things you are allergic to. For example, people who are allergic to pollen can:  · Stay inside during the times of the year when they have symptoms  · Keep car and house windows closed, and use air conditioning instead  · Take a shower before bed to rinse pollen off their hair and skin  · Wear a dust mask if they need to be outside  What if I want to get pregnant? -- If you want to get pregnant, talk with your doctor about which medicines are safe for pregnant women to take. Seasonal allergy symptoms can get worse, get better, or stay the same in pregnant women.  All topics are updated as new evidence becomes available and our peer review process is complete.  This topic retrieved from [employer] on: Sep 21, 2021.  Topic 19768 Version 12.0  Release: 29.4.2 - C29.263  © 2021 UpToDate, Inc. and/or its affiliates. All rights reserved.  figure 1: Common causes of seasonal allergies     Graphic 12569 Version 3.0    Consumer Information Use and Disclaimer   This information is not specific medical advice and does not replace information you receive from your health care provider. This is only a brief summary of general  information. It does NOT include all information about conditions, illnesses, injuries, tests, procedures, treatments, therapies, discharge instructions or life-style choices that may apply to you. You must talk with your health care provider for complete information about your health and treatment options. This information should not be used to decide whether or not to accept your health care provider's advice, instructions or recommendations. Only your health care provider has the knowledge and training to provide advice that is right for you. The use of this information is governed by the [employer] End User License Agreement, available at https://www.[employer]/en/solutions/[employer]/about/tara.The use of [employer] content is governed by the [employer] Terms of Use. ©2021 UpToDate, Inc. All rights reserved.  Copyright   © 2021 UpToDate, Inc. and/or its affiliates. All rights reserved.        Access Navigator team who handles Medicaid referrals INTO [facility]. The main line for that team (for your referrals into [facility]) is [phone] Access Line - helps Medicaid patients to find Medicaid care OUTSIDE of [facility]. Medicaid Access Line contact is: [phone]?  www.[address] [place] [address].org - [phone] - a community line that can help refer

## 2022-02-12 ENCOUNTER — HOSPITAL ENCOUNTER (EMERGENCY)
Facility: HOSPITAL | Age: 32
Discharge: HOME | End: 2022-02-13
Attending: EMERGENCY MEDICINE
Payer: MEDICAID

## 2022-02-12 DIAGNOSIS — E10.9 TYPE 1 DIABETES MELLITUS WITHOUT COMPLICATION: Primary | ICD-10-CM

## 2022-02-12 DIAGNOSIS — H53.8 BLURRY VISION: ICD-10-CM

## 2022-02-12 DIAGNOSIS — R73.9 HYPERGLYCEMIA: ICD-10-CM

## 2022-02-12 LAB
ALBUMIN SERPL BCP-MCNC: 4.3 G/DL (ref 3.5–5.2)
ALLENS TEST: ABNORMAL
ALP SERPL-CCNC: 106 U/L (ref 55–135)
ALT SERPL W/O P-5'-P-CCNC: 24 U/L (ref 10–44)
ANION GAP SERPL CALC-SCNC: 13 MMOL/L (ref 8–16)
AST SERPL-CCNC: 30 U/L (ref 10–40)
BACTERIA #/AREA URNS HPF: NORMAL /HPF
BASOPHILS # BLD AUTO: 0.06 K/UL (ref 0–0.2)
BASOPHILS NFR BLD: 0.6 % (ref 0–1.9)
BILIRUB SERPL-MCNC: 0.6 MG/DL (ref 0.1–1)
BILIRUB UR QL STRIP: NEGATIVE
BNP SERPL-MCNC: <10 PG/ML (ref 0–99)
BUN SERPL-MCNC: 14 MG/DL (ref 6–20)
CALCIUM SERPL-MCNC: 9.2 MG/DL (ref 8.7–10.5)
CHLORIDE SERPL-SCNC: 95 MMOL/L (ref 95–110)
CLARITY UR: CLEAR
CO2 SERPL-SCNC: 22 MMOL/L (ref 23–29)
COLOR UR: COLORLESS
CREAT SERPL-MCNC: 1.6 MG/DL (ref 0.5–1.4)
DELSYS: ABNORMAL
DIFFERENTIAL METHOD: NORMAL
EOSINOPHIL # BLD AUTO: 0.1 K/UL (ref 0–0.5)
EOSINOPHIL NFR BLD: 1.4 % (ref 0–8)
ERYTHROCYTE [DISTWIDTH] IN BLOOD BY AUTOMATED COUNT: 12.3 % (ref 11.5–14.5)
EST. GFR  (AFRICAN AMERICAN): >60 ML/MIN/1.73 M^2
EST. GFR  (NON AFRICAN AMERICAN): 55 ML/MIN/1.73 M^2
GLUCOSE SERPL-MCNC: 591 MG/DL (ref 70–110)
GLUCOSE UR QL STRIP: ABNORMAL
HCO3 UR-SCNC: 25.9 MMOL/L (ref 24–28)
HCT VFR BLD AUTO: 42.8 % (ref 40–54)
HGB BLD-MCNC: 14.6 G/DL (ref 14–18)
HGB UR QL STRIP: NEGATIVE
IMM GRANULOCYTES # BLD AUTO: 0.02 K/UL (ref 0–0.04)
IMM GRANULOCYTES NFR BLD AUTO: 0.2 % (ref 0–0.5)
KETONES UR QL STRIP: ABNORMAL
LEUKOCYTE ESTERASE UR QL STRIP: NEGATIVE
LYMPHOCYTES # BLD AUTO: 4.1 K/UL (ref 1–4.8)
LYMPHOCYTES NFR BLD: 43.4 % (ref 18–48)
MCH RBC QN AUTO: 27.8 PG (ref 27–31)
MCHC RBC AUTO-ENTMCNC: 34.1 G/DL (ref 32–36)
MCV RBC AUTO: 82 FL (ref 82–98)
MICROSCOPIC COMMENT: NORMAL
MONOCYTES # BLD AUTO: 0.4 K/UL (ref 0.3–1)
MONOCYTES NFR BLD: 4.6 % (ref 4–15)
NEUTROPHILS # BLD AUTO: 4.7 K/UL (ref 1.8–7.7)
NEUTROPHILS NFR BLD: 49.8 % (ref 38–73)
NITRITE UR QL STRIP: NEGATIVE
NRBC BLD-RTO: 0 /100 WBC
PCO2 BLDA: 43.5 MMHG (ref 35–45)
PH SMN: 7.38 [PH] (ref 7.35–7.45)
PH UR STRIP: 6 [PH] (ref 5–8)
PLATELET # BLD AUTO: 304 K/UL (ref 150–450)
PMV BLD AUTO: 11.3 FL (ref 9.2–12.9)
PO2 BLDA: 44 MMHG (ref 40–60)
POC BE: 1 MMOL/L
POC SATURATED O2: 79 % (ref 95–100)
POC TCO2: 27 MMOL/L (ref 24–29)
POCT GLUCOSE: 317 MG/DL (ref 70–110)
POCT GLUCOSE: 343 MG/DL (ref 70–110)
POCT GLUCOSE: 415 MG/DL (ref 70–110)
POCT GLUCOSE: >500 MG/DL (ref 70–110)
POTASSIUM SERPL-SCNC: 4.3 MMOL/L (ref 3.5–5.1)
PROT SERPL-MCNC: 8.4 G/DL (ref 6–8.4)
PROT UR QL STRIP: NEGATIVE
RBC # BLD AUTO: 5.25 M/UL (ref 4.6–6.2)
SAMPLE: ABNORMAL
SITE: ABNORMAL
SODIUM SERPL-SCNC: 130 MMOL/L (ref 136–145)
SP GR UR STRIP: >1.03 (ref 1–1.03)
TROPONIN I SERPL DL<=0.01 NG/ML-MCNC: 0.02 NG/ML (ref 0–0.03)
URN SPEC COLLECT METH UR: ABNORMAL
UROBILINOGEN UR STRIP-ACNC: NEGATIVE EU/DL
WBC # BLD AUTO: 9.45 K/UL (ref 3.9–12.7)
YEAST URNS QL MICRO: NORMAL

## 2022-02-12 PROCEDURE — 82803 BLOOD GASES ANY COMBINATION: CPT

## 2022-02-12 PROCEDURE — 99900035 HC TECH TIME PER 15 MIN (STAT)

## 2022-02-12 PROCEDURE — 96374 THER/PROPH/DIAG INJ IV PUSH: CPT

## 2022-02-12 PROCEDURE — 99285 EMERGENCY DEPT VISIT HI MDM: CPT | Mod: 25

## 2022-02-12 PROCEDURE — 96361 HYDRATE IV INFUSION ADD-ON: CPT

## 2022-02-12 PROCEDURE — 80053 COMPREHEN METABOLIC PANEL: CPT | Performed by: EMERGENCY MEDICINE

## 2022-02-12 PROCEDURE — 83880 ASSAY OF NATRIURETIC PEPTIDE: CPT | Performed by: EMERGENCY MEDICINE

## 2022-02-12 PROCEDURE — 85025 COMPLETE CBC W/AUTO DIFF WBC: CPT | Performed by: EMERGENCY MEDICINE

## 2022-02-12 PROCEDURE — 81000 URINALYSIS NONAUTO W/SCOPE: CPT | Performed by: EMERGENCY MEDICINE

## 2022-02-12 PROCEDURE — 63600175 PHARM REV CODE 636 W HCPCS: Performed by: EMERGENCY MEDICINE

## 2022-02-12 PROCEDURE — 96376 TX/PRO/DX INJ SAME DRUG ADON: CPT

## 2022-02-12 PROCEDURE — 84484 ASSAY OF TROPONIN QUANT: CPT | Performed by: EMERGENCY MEDICINE

## 2022-02-12 RX ORDER — METFORMIN HYDROCHLORIDE 500 MG/1
500 TABLET ORAL 2 TIMES DAILY WITH MEALS
Qty: 60 TABLET | Refills: 11 | Status: SHIPPED | OUTPATIENT
Start: 2022-02-12 | End: 2023-02-12

## 2022-02-12 RX ADMIN — INSULIN HUMAN 5 UNITS: 100 INJECTION, SOLUTION PARENTERAL at 08:02

## 2022-02-12 RX ADMIN — INSULIN HUMAN 5 UNITS: 100 INJECTION, SOLUTION PARENTERAL at 10:02

## 2022-02-12 RX ADMIN — SODIUM CHLORIDE, SODIUM LACTATE, POTASSIUM CHLORIDE, AND CALCIUM CHLORIDE 1000 ML: .6; .31; .03; .02 INJECTION, SOLUTION INTRAVENOUS at 10:02

## 2022-02-12 RX ADMIN — SODIUM CHLORIDE, SODIUM LACTATE, POTASSIUM CHLORIDE, AND CALCIUM CHLORIDE 1000 ML: .6; .31; .03; .02 INJECTION, SOLUTION INTRAVENOUS at 07:02

## 2022-02-12 NOTE — Clinical Note
"Roly"Dai Wolfmfield was seen and treated in our emergency department on 2/12/2022.  He may return to work on 02/14/2022.       If you have any questions or concerns, please don't hesitate to call.      Bartolo Negrete MD"

## 2022-02-13 VITALS
RESPIRATION RATE: 18 BRPM | HEIGHT: 68 IN | BODY MASS INDEX: 37.13 KG/M2 | HEART RATE: 85 BPM | WEIGHT: 245 LBS | DIASTOLIC BLOOD PRESSURE: 87 MMHG | OXYGEN SATURATION: 98 % | TEMPERATURE: 97 F | SYSTOLIC BLOOD PRESSURE: 140 MMHG

## 2022-02-13 LAB
POCT GLUCOSE: 104 MG/DL (ref 70–110)
POCT GLUCOSE: 110 MG/DL (ref 70–110)

## 2022-02-13 NOTE — ED PROVIDER NOTES
Encounter Date: 2/12/2022    SCRIBE #1 NOTE: I, Jonn Deondre, am scribing for, and in the presence of, Bartolo Negreet MD.       History     Chief Complaint   Patient presents with    Eye Problem     Patient reports blurred vision to bilateral eyes that started yesterday, denies headache, or injury. CBG >500, no neuro deficits noted, notified sec for CVA rule out.      32 year old male with no known medical problems who presents to the ED with complaints of bilateral blurry vision for three days. Also endorses polyuria, polydipsia for two days, and fatigue. Denies any abdominal pain, nausea, vomiting, numbness, tingling, or weakness. CBG in triage > 500. Denies any Hx of problems with his sugars in the past. No other complaints at this time.      The history is provided by the patient. No  was used.     Review of patient's allergies indicates:  No Known Allergies  History reviewed. No pertinent past medical history.  History reviewed. No pertinent surgical history.  History reviewed. No pertinent family history.  Social History     Tobacco Use    Smoking status: Never Smoker    Smokeless tobacco: Never Used   Substance Use Topics    Alcohol use: Yes     Comment: occasionally    Drug use: No     Review of Systems   Constitutional: Positive for fatigue.   HENT: Negative.    Eyes: Positive for visual disturbance.   Respiratory: Negative.    Cardiovascular: Negative.    Gastrointestinal: Negative.  Negative for abdominal pain, nausea and vomiting.   Endocrine: Positive for polydipsia and polyuria.   Genitourinary: Negative.    Musculoskeletal: Negative.    Skin: Negative.    Allergic/Immunologic: Negative.    Neurological: Negative.  Negative for weakness and numbness.        Negative for tingling.    Hematological: Negative.    Psychiatric/Behavioral: Negative.        Physical Exam     Initial Vitals [02/12/22 1846]   BP Pulse Resp Temp SpO2   136/74 87 20 98.2 °F (36.8 °C) 97 %      MAP        --         Physical Exam    Nursing note and vitals reviewed.  Constitutional: He appears well-developed and well-nourished. He is not diaphoretic. No distress.   HENT:   Head: Normocephalic and atraumatic.   Nose: Nose normal.   Mouth/Throat: No oropharyngeal exudate.   Eyes: EOM are normal. Pupils are equal, round, and reactive to light.   Neck: Neck supple. No tracheal deviation present. No JVD present.   Normal range of motion.  Cardiovascular: Normal rate, regular rhythm, normal heart sounds and intact distal pulses.   Pulmonary/Chest: Breath sounds normal. No respiratory distress. He has no wheezes. He has no rhonchi. He has no rales.   Abdominal: Abdomen is soft. Bowel sounds are normal. He exhibits no distension. There is no abdominal tenderness. There is no rebound and no guarding.   Musculoskeletal:         General: No tenderness or edema. Normal range of motion.      Cervical back: Normal range of motion and neck supple.     Neurological: He is alert and oriented to person, place, and time. He has normal strength.   Skin: Skin is warm and dry. Capillary refill takes less than 2 seconds. No rash noted. No erythema.         ED Course   Procedures  Labs Reviewed   COMPREHENSIVE METABOLIC PANEL - Abnormal; Notable for the following components:       Result Value    Sodium 130 (*)     CO2 22 (*)     Glucose 591 (*)     Creatinine 1.6 (*)     eGFR if non  55 (*)     All other components within normal limits    Narrative:     Glu. Critical result called and verbal readback obtained from   JULI Lui @ 1942 on [id number]. BML by BL1 02/12/2022 19:43   URINALYSIS, REFLEX TO URINE CULTURE - Abnormal; Notable for the following components:    Color, UA Colorless (*)     Specific Gravity, UA >1.030 (*)     Glucose, UA 4+ (*)     Ketones, UA 2+ (*)     All other components within normal limits    Narrative:     Specimen Source->Urine   POCT GLUCOSE - Abnormal; Notable for the following components:     POCT Glucose >500 (*)     All other components within normal limits   POCT GLUCOSE - Abnormal; Notable for the following components:    POCT Glucose 415 (*)     All other components within normal limits   POCT GLUCOSE - Abnormal; Notable for the following components:    POCT Glucose 317 (*)     All other components within normal limits   ISTAT PROCEDURE - Abnormal; Notable for the following components:    POC SATURATED O2 79 (*)     All other components within normal limits   POCT GLUCOSE - Abnormal; Notable for the following components:    POCT Glucose 343 (*)     All other components within normal limits   CBC W/ AUTO DIFFERENTIAL   TROPONIN I   B-TYPE NATRIURETIC PEPTIDE   URINALYSIS MICROSCOPIC    Narrative:     Specimen Source->Urine   POCT GLUCOSE MONITORING CONTINUOUS   POCT GLUCOSE MONITORING CONTINUOUS   POCT GLUCOSE MONITORING CONTINUOUS          Imaging Results          X-Ray Chest 1 View (Final result)  Result time 02/12/22 19:40:39    Final result by Carol Madsen MD (02/12/22 19:40:39)                 Impression:      No acute cardiopulmonary process identified.      Electronically signed by: Carol Madsen MD  Date:    02/12/2022  Time:    19:40             Narrative:    EXAMINATION:  XR CHEST 1 VIEW    CLINICAL HISTORY:  Hyperglycemia, unspecified    TECHNIQUE:  Single frontal view of the chest was performed.    COMPARISON:  November 2018.    FINDINGS:  Cardiac silhouette is normal in size.  Lungs are symmetrically expanded.  No evidence of focal consolidative process, pneumothorax, or significant pleural effusion.  No acute osseous abnormality identified.                                 Medications   lactated ringers bolus 1,000 mL (0 mLs Intravenous Stopped 2/12/22 2055)   lactated ringers bolus 1,000 mL (0 mLs Intravenous Stopped 2/12/22 2105)   insulin regular injection 5 Units (5 Units Intravenous Given 2/12/22 2004)   lactated ringers bolus 1,000 mL (0 mLs Intravenous Stopped 2/12/22  2450)   insulin regular injection 5 Units (5 Units Intravenous Given 2/12/22 2167)              MDM:    32-year-old male with past medical history as noted above presenting with blurry vision, fatigue.  Patient found to be profoundly hyper glycemic at 591, sodium 130, corrects, creatinine at 1.6.  Patient given a total 3 L of IV fluids, 10 units of IV insulin with improvement in symptoms and glucose.  Patient with a new diagnosis of diabetes, will need primary care follow-up, will start on metformin here today and have him follow-up.  No evidence of DKA at this time.  Discussed diagnosis and further treatment with patient, including f/u with PCP in the next week.  Return precautions given and all questions answered.  Patient in understanding of plan.  Pt discharged to home improved and stable.       Scribe Attestation:   Scribe #1: I performed the above scribed service and the documentation accurately describes the services I performed. I attest to the accuracy of the note.                 I, Bartolo Negrete M.D., personally performed the services described in this documentation. All medical record entries made by the scribe were at my direction and in my presence. I have reviewed the chart and agree that the record reflects my personal performance and is accurate and complete.    Clinical Impression:   Final diagnoses:  [H53.8] Blurry vision  [R73.9] Hyperglycemia  [E10.9] Type 1 diabetes mellitus without complication (Primary)          ED Disposition Condition    Discharge Stable        ED Prescriptions     Medication Sig Dispense Start Date End Date Auth. Provider    metFORMIN (GLUCOPHAGE) 500 MG tablet Take 1 tablet (500 mg total) by mouth 2 (two) times daily with meals. 60 tablet 2/12/2022 2/12/2023 Bartolo Negrete MD        Follow-up Information     Follow up With Specialties Details Why Contact [facility] Emergency Dept Emergency Medicine Go to  If symptoms worsen [address]  [facility] [id number] [phone]    Fabián Hyatt MD Family Medicine Schedule an appointment as soon as possible for a visit   [address] [facility] [id number] [phone]      Asael Hudson Jr., MD Family Medicine Schedule an appointment as soon as possible for a visit   [address] [facility] [address]  [phone]      [facility]  Schedule an appointment as soon as possible for a visit   [address] [facility] [address]  [phone]             Bartolo Negrete MD  02/13/22 0030

## 2022-02-16 ENCOUNTER — HOSPITAL ENCOUNTER (EMERGENCY)
Facility: HOSPITAL | Age: 32
Discharge: HOME | End: 2022-02-16
Attending: EMERGENCY MEDICINE
Payer: MEDICAID

## 2022-02-16 VITALS
HEART RATE: 73 BPM | OXYGEN SATURATION: 99 % | DIASTOLIC BLOOD PRESSURE: 60 MMHG | RESPIRATION RATE: 17 BRPM | TEMPERATURE: 99 F | SYSTOLIC BLOOD PRESSURE: 110 MMHG | BODY MASS INDEX: 36.49 KG/M2 | WEIGHT: 240 LBS

## 2022-02-16 DIAGNOSIS — E13.65 OTHER SPECIFIED DIABETES MELLITUS WITH HYPERGLYCEMIA, UNSPECIFIED WHETHER LONG TERM INSULIN USE: Primary | ICD-10-CM

## 2022-02-16 DIAGNOSIS — R74.8 ELEVATED LIVER ENZYMES: ICD-10-CM

## 2022-02-16 LAB
ALBUMIN SERPL BCP-MCNC: 3.9 G/DL (ref 3.5–5.2)
ALLENS TEST: ABNORMAL
ALP SERPL-CCNC: 109 U/L (ref 55–135)
ALT SERPL W/O P-5'-P-CCNC: 168 U/L (ref 10–44)
ANION GAP SERPL CALC-SCNC: 7 MMOL/L (ref 8–16)
AST SERPL-CCNC: 165 U/L (ref 10–40)
B-OH-BUTYR BLD STRIP-SCNC: 0.7 MMOL/L (ref 0–0.5)
BACTERIA #/AREA URNS HPF: NORMAL /HPF
BASOPHILS # BLD AUTO: 0.05 K/UL (ref 0–0.2)
BASOPHILS NFR BLD: 0.8 % (ref 0–1.9)
BILIRUB SERPL-MCNC: 0.3 MG/DL (ref 0.1–1)
BILIRUB UR QL STRIP: NEGATIVE
BUN SERPL-MCNC: 14 MG/DL (ref 6–20)
CALCIUM SERPL-MCNC: 9.1 MG/DL (ref 8.7–10.5)
CHLORIDE SERPL-SCNC: 99 MMOL/L (ref 95–110)
CLARITY UR: CLEAR
CO2 SERPL-SCNC: 24 MMOL/L (ref 23–29)
COLOR UR: COLORLESS
CREAT SERPL-MCNC: 1.3 MG/DL (ref 0.5–1.4)
DELSYS: ABNORMAL
DIFFERENTIAL METHOD: ABNORMAL
EOSINOPHIL # BLD AUTO: 0.1 K/UL (ref 0–0.5)
EOSINOPHIL NFR BLD: 1.6 % (ref 0–8)
ERYTHROCYTE [DISTWIDTH] IN BLOOD BY AUTOMATED COUNT: 12.8 % (ref 11.5–14.5)
ERYTHROCYTE [SEDIMENTATION RATE] IN BLOOD BY WESTERGREN METHOD: 18 MM/H
EST. GFR  (AFRICAN AMERICAN): >60 ML/MIN/1.73 M^2
EST. GFR  (NON AFRICAN AMERICAN): >60 ML/MIN/1.73 M^2
FIO2: 21
GLUCOSE SERPL-MCNC: 465 MG/DL (ref 70–110)
GLUCOSE UR QL STRIP: ABNORMAL
HCO3 UR-SCNC: 25.8 MMOL/L (ref 24–28)
HCT VFR BLD AUTO: 39.8 % (ref 40–54)
HGB BLD-MCNC: 13.4 G/DL (ref 14–18)
HGB UR QL STRIP: NEGATIVE
IMM GRANULOCYTES # BLD AUTO: 0.02 K/UL (ref 0–0.04)
IMM GRANULOCYTES NFR BLD AUTO: 0.3 % (ref 0–0.5)
KETONES UR QL STRIP: ABNORMAL
LEUKOCYTE ESTERASE UR QL STRIP: NEGATIVE
LYMPHOCYTES # BLD AUTO: 2.3 K/UL (ref 1–4.8)
LYMPHOCYTES NFR BLD: 37.8 % (ref 18–48)
MCH RBC QN AUTO: 27.4 PG (ref 27–31)
MCHC RBC AUTO-ENTMCNC: 33.7 G/DL (ref 32–36)
MCV RBC AUTO: 81 FL (ref 82–98)
MICROSCOPIC COMMENT: NORMAL
MODE: ABNORMAL
MONOCYTES # BLD AUTO: 0.3 K/UL (ref 0.3–1)
MONOCYTES NFR BLD: 5.2 % (ref 4–15)
NEUTROPHILS # BLD AUTO: 3.4 K/UL (ref 1.8–7.7)
NEUTROPHILS NFR BLD: 54.3 % (ref 38–73)
NITRITE UR QL STRIP: NEGATIVE
NRBC BLD-RTO: 0 /100 WBC
PCO2 BLDA: 45.3 MMHG (ref 35–45)
PH SMN: 7.36 [PH] (ref 7.35–7.45)
PH UR STRIP: 6 [PH] (ref 5–8)
PLATELET # BLD AUTO: 256 K/UL (ref 150–450)
PMV BLD AUTO: 11.4 FL (ref 9.2–12.9)
PO2 BLDA: 34 MMHG (ref 40–60)
POC BE: 0 MMOL/L
POC SATURATED O2: 63 % (ref 95–100)
POC TCO2: 27 MMOL/L (ref 24–29)
POCT GLUCOSE: 297 MG/DL (ref 70–110)
POCT GLUCOSE: 389 MG/DL (ref 70–110)
POCT GLUCOSE: 415 MG/DL (ref 70–110)
POTASSIUM SERPL-SCNC: 4.1 MMOL/L (ref 3.5–5.1)
PROT SERPL-MCNC: 7.4 G/DL (ref 6–8.4)
PROT UR QL STRIP: NEGATIVE
RBC # BLD AUTO: 4.89 M/UL (ref 4.6–6.2)
SAMPLE: ABNORMAL
SITE: ABNORMAL
SODIUM SERPL-SCNC: 130 MMOL/L (ref 136–145)
SP GR UR STRIP: 1.01 (ref 1–1.03)
SP02: 99
URN SPEC COLLECT METH UR: ABNORMAL
UROBILINOGEN UR STRIP-ACNC: NEGATIVE EU/DL
WBC # BLD AUTO: 6.19 K/UL (ref 3.9–12.7)
YEAST URNS QL MICRO: NORMAL

## 2022-02-16 PROCEDURE — 99900035 HC TECH TIME PER 15 MIN (STAT)

## 2022-02-16 PROCEDURE — 85025 COMPLETE CBC W/AUTO DIFF WBC: CPT | Performed by: EMERGENCY MEDICINE

## 2022-02-16 PROCEDURE — 96374 THER/PROPH/DIAG INJ IV PUSH: CPT

## 2022-02-16 PROCEDURE — 82962 GLUCOSE BLOOD TEST: CPT

## 2022-02-16 PROCEDURE — 63600175 PHARM REV CODE 636 W HCPCS: Performed by: EMERGENCY MEDICINE

## 2022-02-16 PROCEDURE — 82803 BLOOD GASES ANY COMBINATION: CPT

## 2022-02-16 PROCEDURE — 25000003 PHARM REV CODE 250: Performed by: EMERGENCY MEDICINE

## 2022-02-16 PROCEDURE — 81000 URINALYSIS NONAUTO W/SCOPE: CPT | Performed by: EMERGENCY MEDICINE

## 2022-02-16 PROCEDURE — 80053 COMPREHEN METABOLIC PANEL: CPT | Performed by: EMERGENCY MEDICINE

## 2022-02-16 PROCEDURE — 99284 EMERGENCY DEPT VISIT MOD MDM: CPT | Mod: 25

## 2022-02-16 PROCEDURE — 82010 KETONE BODYS QUAN: CPT | Performed by: EMERGENCY MEDICINE

## 2022-02-16 PROCEDURE — 96361 HYDRATE IV INFUSION ADD-ON: CPT

## 2022-02-16 RX ADMIN — INSULIN HUMAN 4 UNITS: 100 INJECTION, SOLUTION PARENTERAL at 11:02

## 2022-02-16 RX ADMIN — SODIUM CHLORIDE 1000 ML: 0.9 INJECTION, SOLUTION INTRAVENOUS at 10:02

## 2022-02-16 NOTE — CONSULTS
Consult received.  Patient needs assistance obtaining insulin.  Per ED physician, patient was seen at [facility] yesterday and provided an Rx for insulin.  Patient was unable to obtain Rx from pharmacy.  Patient doesn't have glucometer or supplies.    ARGENTINA met with patient at the bedside.  ARGENTINA with patient's permission contacted [facility].  Call on speakphone so that patient might confirm permission to speak with provider.  ARGENTINA and patient spoke with Lola at [facility] ([phone]).  Explained that patient was not able to obtain Rx from pharmacy and that he also needed glucometer, lancets, strips, and diabetic education, e.g. diet.  Lola advised patient and  that a prior authorization request had been submitted for the [employer] and it had been approved.  Lola to have orders for glucometer, strips, and lancets sent to patient's pharmacy.  She will check with the coordinator at the clinic to arrange for patient teaching/education.    ARGENTINA contacted [facility] pharmacy at [phone] [facility]  Per Sugey at [facility], the prior authorization went through.  Rx has been approved.  Co-pay is $0.  ARGENTINA updated Dr. Nroton.      [id number] - [place], [place] - [address] [facility]  [address] [place] [address]  Phone: [phone] Fax: [phone]    [id number] - [place] - [address] [facility] AT [facility] [address]  [place] [address]  Phone: [phone] Fax: [phone]

## 2022-02-16 NOTE — PROGRESS NOTES
Consult received.  Patient needs assistance obtaining insulin.  Per ED physician, patient was seen at [facility] yesterday and provided an Rx for insulin.  Patient was unable to obtain Rx from pharmacy.  Patient doesn't have glucometer or supplies.    ARGENTINA met with patient at the bedside.  ARGENTINA with patient's permission contacted [facility].  Call on speakphone so that patient might confirm permission to speak with provider.  ARGENTINA and patient spoke with Lola at [facility] ([phone]).  Explained that patient was not able to obtain Rx from pharmacy and that he also needed glucometer, lancets, strips, and diabetic education, e.g. diet.  Lola advised patient and  that a prior authorization request had been submitted for the Trul[employer] and it had been approved.  Lola to have orders for glucometer, strips, and lancets sent to patient's pharmacy.  She will check with the coordinator at the clinic to arrange for patient teaching/education.    ARGENTINA contacted [facility] pharmacy at [phone] to check on [facility]  Per Sugey at [facility], the prior authorization went through.  Rx has been approved.  Co-pay is $0.  ARGENTINA updated Dr. Norton.      [id number] - [place], [place] - [address] [facility]  [address] [place] [address]  Phone: [phone] Fax: [phone]    [id number] - [place] - [address] [facility] AT [facility] [address]  [place] [address]  Phone: [phone] Fax: [phone]

## 2022-02-16 NOTE — ED PROVIDER NOTES
"Encounter Date: 2/16/2022    SCRIBE #1 NOTE: I, Dania Liu, am scribing for, and in the presence of,  Dali Norton MD. I have scribed the following portions of the note - Other sections scribed: HPI, ROS, PE.       History     Chief Complaint   Patient presents with    Medication Refill     Patient was recently diagnosed with diabetes and does not have a glucometer or insulin. Patient currently taking Metformin for diabetes and was advised by Dr. Alonso at [facility] to come in for a follow up visit since patient also needs insulin and his insurance does not cover it. CBG in triage 415.     Roly Candelaria is a 32 y.o. male who presents to the ED today for a medication refill. The patient states that he was diagnosed with Type II Diabetes Mellitus 3 days ago and was prescribed Metformin 500 mg BID. He was seen by Dr. Whitlock at the [facility] clinic yesterday, he was hyperglycemic and was instructed to increase Metformin to 1000 BID and was prescribed an insulin that "starts with a T." He also states that he is unsure of his blood sugar readings because he does not possess a Glucometer. He reports that he will be visiting an Endocrinologist in April.  Patient is concerned because his insurance does not cover the medication that was prescribed yesterday, he feels uneducated regarding his the new diabetes diagnosis and is requesting help.  He denies fever, chest pain, or other associated symptoms. No other complaints at this time.    The history is provided by the patient.     Review of patient's allergies indicates:  No Known Allergies  No past medical history on file.  No past surgical history on file.  No family history on file.  Social History     Tobacco Use    Smoking status: Never Smoker    Smokeless tobacco: Never Used   Substance Use Topics    Alcohol use: Yes     Comment: occasionally    Drug use: No     Review of Systems   Constitutional: Negative for chills and fever.   HENT: Negative for " congestion and sore throat.    Eyes: Negative for visual disturbance.   Respiratory: Negative for cough and shortness of breath.    Cardiovascular: Negative for chest pain.   Gastrointestinal: Negative for abdominal pain, nausea and vomiting.   Genitourinary: Negative for dysuria.   Skin: Negative for rash.   Neurological: Negative for headaches.   Psychiatric/Behavioral: Negative for confusion.       Physical Exam     Initial Vitals [02/16/22 0906]   BP Pulse Resp Temp SpO2   138/78 92 18 97.9 °F (36.6 °C) 98 %      MAP       --         Physical Exam    Nursing note and vitals reviewed.  Constitutional: He appears well-developed and well-nourished. He is not diaphoretic. No distress.   HENT:   Head: Normocephalic and atraumatic.   Mouth/Throat: Oropharynx is clear and moist.   Eyes: EOM are normal. Pupils are equal, round, and reactive to light.   Neck: Neck supple.   Cardiovascular: Normal rate and regular rhythm.   Pulmonary/Chest: Breath sounds normal. No respiratory distress.   Abdominal: Abdomen is soft. Bowel sounds are normal.   Musculoskeletal:         General: No edema.      Cervical back: Neck supple.     Neurological: He is alert and oriented to person, place, and time.   Skin: Skin is warm and dry.   Psychiatric: He has a normal mood and affect.         ED Course   Procedures  Labs Reviewed   CBC W/ AUTO DIFFERENTIAL - Abnormal; Notable for the following components:       Result Value    Hemoglobin 13.4 (*)     Hematocrit 39.8 (*)     MCV 81 (*)     All other components within normal limits   COMPREHENSIVE METABOLIC PANEL - Abnormal; Notable for the following components:    Sodium 130 (*)     Glucose 465 (*)      (*)      (*)     Anion Gap 7 (*)     All other components within normal limits    Narrative:     GLUCOSE critical result(s) called and verbal readback obtained from   SEBASTIÁN ARNOLD  by [facility] 02/16/2022 11:04   BETA - HYDROXYBUTYRATE, SERUM - Abnormal; Notable for the following  components:    Beta-Hydroxybutyrate 0.7 (*)     All other components within normal limits   URINALYSIS, REFLEX TO URINE CULTURE - Abnormal; Notable for the following components:    Color, UA Colorless (*)     Glucose, UA 4+ (*)     Ketones, UA Trace (*)     All other components within normal limits    Narrative:     Specimen Source->Urine   POCT GLUCOSE - Abnormal; Notable for the following components:    POCT Glucose 415 (*)     All other components within normal limits   ISTAT PROCEDURE - Abnormal; Notable for the following components:    POC PCO2 45.3 (*)     POC PO2 34 (*)     POC SATURATED O2 63 (*)     All other components within normal limits   POCT GLUCOSE - Abnormal; Notable for the following components:    POCT Glucose 389 (*)     All other components within normal limits   POCT GLUCOSE - Abnormal; Notable for the following components:    POCT Glucose 297 (*)     All other components within normal limits   URINALYSIS MICROSCOPIC    Narrative:     Specimen Source->Urine          Imaging Results    None          Medications   sodium chloride 0.9% bolus 1,000 mL (0 mLs Intravenous Stopped 2/16/22 1125)   insulin regular injection 4 Units (4 Units Intravenous Given 2/16/22 1137)     Medical Decision Making:   Initial Assessment:   32-year-old male, newly diagnosed with diabetes, presenting with request for medication help.  Patient was started on metformin 500 mg b.i.d., this was increased yesterday in clinic to a 1000 mg b.i.d. due to hyperglycemia, he was also prescribed and additional insulin medication.  He states his insurance does not cover that medication.  He has no physical complaints today.  He was hyperglycemic at triage.  Will get labs to assess for DKA or electrolyte disturbance, I have consulted the  to help with obtaining medication and glucometer.          Scribe Attestation:   Scribe #1: I performed the above scribed service and the documentation accurately describes the services  I performed. I attest to the accuracy of the note.        ED Course as of 02/16/22 1605   Wed Feb 16, 2022   1051 Ms. Khan () helped patient call the clinic, his Trul[employer] require to prior authorization, we have confirmed that has been completed and he how has a 0 co-pay.  Lola at the [facility] clinic will be calling in glucometer, test strips and lancets and will see patient can come to clinic today for diabetic Education. [LH]   1126 Labs reviewed, blood glucose 465, sodium level corrects, creatinine within normal limits.  Bicarb normal, no elevated anion gap.  Serum ketones only minimally elevated and pH is normal.  Patient given 1 L of IV fluids, will give 4 units of insulin and recheck blood glucose, anticipate discharge. [LH]   1250 Repeat blood glucose 297. Reviewed with patient liver enzymes are elevated, denies alcohol or Tylenol use.  I have advised him he will need to follow up with the [facility] clinic for recheck of those enzymes.  He is to call the clinic today to schedule his diabetic Education.   [LH]      ED Course User Index  [LH] Dali Norton MD             Clinical Impression:   Final diagnoses:  [E13.65] Other specified diabetes mellitus with hyperglycemia, unspecified whether long term insulin use (Primary)  [R74.8] Elevated liver enzymes          ED Disposition Condition    Discharge Stable        ED Prescriptions     None        Follow-up Information     Follow up With Specialties Details Why Contact Info    [facility]  Call today  [address] [facility] [address]  [phone]      [facility] - Emergency Dept Emergency Medicine  As needed, If symptoms worsen [address] [facility] [address] [phone]         I, Dali Norton MD, personally performed the services described in this documentation. All medical record entries made by the scribe were at my direction and in my presence. I have reviewed the chart and agree that the record  reflects my personal performance and is accurate and complete.    This dictation has been generated using M-Modal Fluency Direct dictation; some phonetic errors may occur.        Dali Norton MD  02/16/22 6540

## 2022-11-02 ENCOUNTER — OFFICE VISIT (OUTPATIENT)
Dept: INTERNAL MEDICINE | Facility: CLINIC | Age: 32
End: 2022-11-02
Payer: MEDICAID

## 2022-11-02 VITALS
TEMPERATURE: 99 F | SYSTOLIC BLOOD PRESSURE: 113 MMHG | HEIGHT: 68 IN | OXYGEN SATURATION: 95 % | DIASTOLIC BLOOD PRESSURE: 75 MMHG | BODY MASS INDEX: 35.79 KG/M2 | WEIGHT: 236.13 LBS | HEART RATE: 78 BPM

## 2022-11-02 DIAGNOSIS — J10.1 INFLUENZA A H1N1 INFECTION: Primary | ICD-10-CM

## 2022-11-02 LAB
CTP QC/QA: YES
CTP QC/QA: YES
POC MOLECULAR INFLUENZA A AGN: POSITIVE
POC MOLECULAR INFLUENZA B AGN: NEGATIVE
SARS-COV-2 RDRP RESP QL NAA+PROBE: NEGATIVE

## 2022-11-02 PROCEDURE — 3078F DIAST BP <80 MM HG: CPT | Mod: CPTII,,,

## 2022-11-02 PROCEDURE — 87635 SARS-COV-2 COVID-19 AMP PRB: CPT | Mod: PBBFAC

## 2022-11-02 PROCEDURE — 99203 PR OFFICE/OUTPT VISIT, NEW, LEVL III, 30-44 MIN: ICD-10-PCS | Mod: S$PBB,,,

## 2022-11-02 PROCEDURE — 99213 OFFICE O/P EST LOW 20 MIN: CPT | Mod: PBBFAC

## 2022-11-02 PROCEDURE — 3008F PR BODY MASS INDEX (BMI) DOCUMENTED: ICD-10-PCS | Mod: CPTII,,,

## 2022-11-02 PROCEDURE — 3008F BODY MASS INDEX DOCD: CPT | Mod: CPTII,,,

## 2022-11-02 PROCEDURE — 3074F PR MOST RECENT SYSTOLIC BLOOD PRESSURE < 130 MM HG: ICD-10-PCS | Mod: CPTII,,,

## 2022-11-02 PROCEDURE — 99999 PR PBB SHADOW E&M-EST. PATIENT-LVL III: CPT | Mod: PBBFAC,,,

## 2022-11-02 PROCEDURE — 99999 PR PBB SHADOW E&M-EST. PATIENT-LVL III: ICD-10-PCS | Mod: PBBFAC,,,

## 2022-11-02 PROCEDURE — 3078F PR MOST RECENT DIASTOLIC BLOOD PRESSURE < 80 MM HG: ICD-10-PCS | Mod: CPTII,,,

## 2022-11-02 PROCEDURE — 99203 OFFICE O/P NEW LOW 30 MIN: CPT | Mod: S$PBB,,,

## 2022-11-02 PROCEDURE — 3074F SYST BP LT 130 MM HG: CPT | Mod: CPTII,,,

## 2022-11-02 PROCEDURE — 87502 INFLUENZA DNA AMP PROBE: CPT | Mod: PBBFAC

## 2022-11-02 RX ORDER — OSELTAMIVIR PHOSPHATE 75 MG/1
75 CAPSULE ORAL 2 TIMES DAILY
Qty: 10 CAPSULE | Refills: 0 | Status: SHIPPED | OUTPATIENT
Start: 2022-11-02 | End: 2022-11-07

## 2022-11-02 NOTE — LETTER
November 2, 2022    Re: Roly Candelaria  [address]  [place] [address]             Kirby Richa [facility]  [address]  [facility] [address]  Phone: [phone]  Fax: [phone] Dear Ms. Verma:    I saw Mr. Candelaria today in clinic, and it is my medical opinion that he should remain isolated until he is free of his current symptoms for 24 hours.      If you have any questions or concerns, please don't hesitate to call.    Sincerely,        Ko Navarro MD

## 2022-11-02 NOTE — PATIENT INSTRUCTIONS
"Take this 3x per day:  -Guanefecin (Mucinex)  -Dextromethorphan ("DM" eg Robitussin)  -Advil (800 mg)  "

## 2022-11-02 NOTE — PROGRESS NOTES
"    [facility]  RESIDENT CONTINUITY CLINIC NOTE    Name: Roly Candelaria  : 1990  MRN: [id number]  Date of Service: 2022   PCP: Primary Doctor No        Assessment and Plan:       Acute respiratory infection  -     POCT COVID-19 Rapid Screening  -     POCT Influenza A/B Molecular  Influenza A (+), so I will call him in some Tamiflu.  Also recommended the following 3x per day:  -Guanefecin (Mucinex)  -Dextromethorphan ("DM" eg Robitussin)  -Advil (800 mg)    Preventative Health: Declines all today      Subjective:             Reason for visit:   Chief Complaint   Patient presents with    Sore Throat    Nasal Congestion     Pt sttaes symptoms started last night       HPI: Roly Candelaria is a previously-healthy 32 y.o. male who presents to clinic for Urgent Care eval of symptoms of a URI. Symptoms include congestion, irritability, sore throat, and fatigue, feeling cold, and subjective fever . Onset of symptoms was 1 day ago, unchanged since that time. He also c/o achiness, cough described as productive of yellow sputum, and headache described as 6/10  for the past 1 day .  He is drinking plenty of fluids. Evaluation to date: none. Treatment to date:  Aleve (helped headache some) .      Medications:   Current Outpatient Medications:     cetirizine (ZYRTEC) 10 MG tablet, Take 1 tablet (10 mg total) by mouth once daily., Disp: 30 tablet, Rfl: 0    fluticasone propionate (FLONASE) 50 mcg/actuation nasal spray, 1 spray (50 mcg total) by Each Nostril route once daily., Disp: 16 g, Rfl: 0    ibuprofen (ADVIL,MOTRIN) 600 MG tablet, Take 1 tablet (600 mg total) by mouth every 6 (six) hours as needed for Pain., Disp: 20 tablet, Rfl: 0    metFORMIN (GLUCOPHAGE) 500 MG tablet, Take 1 tablet (500 mg total) by mouth 2 (two) times daily with meals., Disp: 60 tablet, Rfl: 11    olopatadine (PATADAY) 0.2 % Drop, Place 1 drop into both eyes once daily., Disp: 2 mL, Rfl: 0 " "    Review of Systems:   Review of Systems   Constitutional:  Positive for fever and malaise/fatigue. Negative for chills and weight loss.   HENT:  Positive for congestion and sore throat. Negative for nosebleeds and sinus pain.    Eyes:  Negative for blurred vision, photophobia, pain and redness.   Respiratory:  Negative for cough, hemoptysis, sputum production, shortness of breath, wheezing and stridor.    Cardiovascular:  Negative for chest pain, palpitations, orthopnea, claudication and leg swelling.   Gastrointestinal:  Positive for nausea. Negative for abdominal pain, blood in stool, constipation, diarrhea, heartburn and vomiting.   Genitourinary:  Negative for dysuria, flank pain, frequency and urgency.   Musculoskeletal:  Negative for back pain, joint pain, myalgias and neck pain.   Skin:  Negative for itching and rash.   Neurological:  Positive for headaches. Negative for dizziness, tremors and seizures.   Endo/Heme/Allergies:  Negative for polydipsia.   Psychiatric/Behavioral:  The patient is not nervous/anxious and does not have insomnia.         Objective:       Vitals:   Vitals:    11/02/22 1442   BP: 113/75   BP Location: Left arm   Patient Position: Sitting   BP Method: Large (Automatic)   Pulse: 78   Temp: 98.7 °F (37.1 °C)   SpO2: 95%   Weight: 107.1 kg (236 lb 1.8 oz)   Height: 5' 8" (1.727 m)     Body mass index is 35.9 kg/m².    Physical Exam:   Physical Exam  Constitutional:       General: He is not in acute distress.     Appearance: Normal appearance. He is normal weight. He is ill-appearing. He is not toxic-appearing.   HENT:      Head: Normocephalic and atraumatic.      Right Ear: External ear normal.      Left Ear: External ear normal.      Nose: Nose normal.      Mouth/Throat:      Mouth: Mucous membranes are moist.      Pharynx: Oropharynx is clear.   Eyes:      Extraocular Movements: Extraocular movements intact.      Conjunctiva/sclera: Conjunctivae normal.   Neck:      Vascular: No " carotid bruit.   Cardiovascular:      Rate and Rhythm: Normal rate and regular rhythm.      Pulses: Normal pulses.      Heart sounds: Normal heart sounds.   Pulmonary:      Effort: Pulmonary effort is normal.      Breath sounds: Rales (fine, in central airway) present.      Comments: Lung peripheries sounded clear  Abdominal:      General: Abdomen is flat. Bowel sounds are normal. There is no distension.      Tenderness: There is no abdominal tenderness. There is no guarding or rebound.   Musculoskeletal:         General: Normal range of motion.      Cervical back: Normal range of motion and neck supple.      Right lower leg: No edema.      Left lower leg: No edema.   Skin:     General: Skin is warm and dry.      Capillary Refill: Capillary refill takes less than 2 seconds.   Neurological:      Mental Status: He is alert and oriented to person, place, and time. Mental status is at baseline.   Psychiatric:         Mood and Affect: Mood normal.         Behavior: Behavior normal.       Labs: Previous labs reviewed.  INFLUENZA A (+)    Imaging: Previous imaging reviewed.     Discussed with Dr. Moreira, further recommendations as per attending addendum. Please feel free to call with any questions or concerns.    Ko Navarro MD MPH  [facility], PGY-II  [facility]  [address] [place] [address]  [phone]

## 2023-02-24 ENCOUNTER — OFFICE VISIT (OUTPATIENT)
Dept: URGENT CARE | Facility: CLINIC | Age: 33
End: 2023-02-24
Payer: MEDICAID

## 2023-02-24 VITALS
HEART RATE: 93 BPM | OXYGEN SATURATION: 97 % | RESPIRATION RATE: 18 BRPM | WEIGHT: 236 LBS | BODY MASS INDEX: 35.88 KG/M2 | TEMPERATURE: 99 F | DIASTOLIC BLOOD PRESSURE: 84 MMHG | SYSTOLIC BLOOD PRESSURE: 124 MMHG

## 2023-02-24 DIAGNOSIS — J02.9 SORE THROAT: ICD-10-CM

## 2023-02-24 DIAGNOSIS — J02.0 STREP THROAT: Primary | ICD-10-CM

## 2023-02-24 PROBLEM — E11.65 TYPE 2 DIABETES MELLITUS WITH HYPERGLYCEMIA: Status: ACTIVE | Noted: 2022-02-15

## 2023-02-24 PROBLEM — E66.9 OBESITY: Status: ACTIVE | Noted: 2022-02-15

## 2023-02-24 LAB
CTP QC/QA: YES
MOLECULAR STREP A: POSITIVE

## 2023-02-24 PROCEDURE — 3074F PR MOST RECENT SYSTOLIC BLOOD PRESSURE < 130 MM HG: ICD-10-PCS | Mod: CPTII,S$GLB,, | Performed by: FAMILY MEDICINE

## 2023-02-24 PROCEDURE — 3079F PR MOST RECENT DIASTOLIC BLOOD PRESSURE 80-89 MM HG: ICD-10-PCS | Mod: CPTII,S$GLB,, | Performed by: FAMILY MEDICINE

## 2023-02-24 PROCEDURE — 87651 STREP A DNA AMP PROBE: CPT | Mod: QW,S$GLB,, | Performed by: FAMILY MEDICINE

## 2023-02-24 PROCEDURE — 1159F MED LIST DOCD IN RCRD: CPT | Mod: CPTII,S$GLB,, | Performed by: FAMILY MEDICINE

## 2023-02-24 PROCEDURE — 1159F PR MEDICATION LIST DOCUMENTED IN MEDICAL RECORD: ICD-10-PCS | Mod: CPTII,S$GLB,, | Performed by: FAMILY MEDICINE

## 2023-02-24 PROCEDURE — 3074F SYST BP LT 130 MM HG: CPT | Mod: CPTII,S$GLB,, | Performed by: FAMILY MEDICINE

## 2023-02-24 PROCEDURE — 3008F BODY MASS INDEX DOCD: CPT | Mod: CPTII,S$GLB,, | Performed by: FAMILY MEDICINE

## 2023-02-24 PROCEDURE — 1160F RVW MEDS BY RX/DR IN RCRD: CPT | Mod: CPTII,S$GLB,, | Performed by: FAMILY MEDICINE

## 2023-02-24 PROCEDURE — 3008F PR BODY MASS INDEX (BMI) DOCUMENTED: ICD-10-PCS | Mod: CPTII,S$GLB,, | Performed by: FAMILY MEDICINE

## 2023-02-24 PROCEDURE — 1160F PR REVIEW ALL MEDS BY PRESCRIBER/CLIN PHARMACIST DOCUMENTED: ICD-10-PCS | Mod: CPTII,S$GLB,, | Performed by: FAMILY MEDICINE

## 2023-02-24 PROCEDURE — 3079F DIAST BP 80-89 MM HG: CPT | Mod: CPTII,S$GLB,, | Performed by: FAMILY MEDICINE

## 2023-02-24 PROCEDURE — 87651 POCT STREP A MOLECULAR: ICD-10-PCS | Mod: QW,S$GLB,, | Performed by: FAMILY MEDICINE

## 2023-02-24 PROCEDURE — 99213 OFFICE O/P EST LOW 20 MIN: CPT | Mod: S$GLB,,, | Performed by: FAMILY MEDICINE

## 2023-02-24 PROCEDURE — 99213 PR OFFICE/OUTPT VISIT, EST, LEVL III, 20-29 MIN: ICD-10-PCS | Mod: S$GLB,,, | Performed by: FAMILY MEDICINE

## 2023-02-24 RX ORDER — AMOXICILLIN 500 MG/1
500 TABLET, FILM COATED ORAL EVERY 12 HOURS
Qty: 20 TABLET | Refills: 0 | Status: SHIPPED | OUTPATIENT
Start: 2023-02-24 | End: 2023-03-06

## 2023-02-24 RX ORDER — ACETAMINOPHEN 500 MG
1000 TABLET ORAL
Status: COMPLETED | OUTPATIENT
Start: 2023-02-24 | End: 2023-02-24

## 2023-02-24 RX ADMIN — Medication 1000 MG: at 03:02

## 2023-02-24 NOTE — PATIENT INSTRUCTIONS
General Discharge Instructions   PLEASE READ YOUR DISCHARGE INSTRUCTIONS ENTIRELY AS IT CONTAINS IMPORTANT INFORMATION.  If you were prescribed a narcotic or controlled medication, do not drive or operate heavy equipment or machinery while taking these medications.  If you were prescribed antibiotics, please take them to completion.  You must understand that you've received an Urgent Care treatment only and that you may be released before all your medical problems are known or treated. You, the patient, will arrange for follow up care as instructed.    OVER THE COUNTER RECOMMENDATIONS/SUGGESTIONS.    Make sure to stay well hydrated.    Use Nasal Saline to mechanically move any post nasal drip from your eustachian tube or from the back of your throat.    Use warm salt water gargles to ease your throat pain. Warm salt water gargles as needed for sore throat- 1/2 tsp salt to 1 cup warm water, gargle as desired.    Use an antihistamine such as Claritin, Zyrtec or Allegra to dry you out.    Use pseudoephedrine (behind the counter) to decongest. Pseudoephedrine 30 mg up to 240 mg /day. It can raise your blood pressure and give you palpitations.    Use mucinex (guaifenesin) to break up mucous up to 2400mg/day to loosen any mucous.    The mucinex DM pill has a cough suppressant that can be sedating. It can be used at night to stop the tickle at the back of your throat.    You can use Mucinex D (it has guaifenesin and a high dose of pseudoephedrine) in the mornings to help decongest.    Use Afrin in each nare for no longer than 3 days, as it is addictive. It can also dry out your mucous membranes and cause elevated blood pressure. This is especially useful if you are flying.    Use Flonase 1-2 sprays/nostril per day. It is a local acting steroid nasal spray, if you develop a bloody nose, stop using the medication immediately.    Sometimes Nyquil at night is beneficial to help you get some rest, however it is sedating and it  does have an antihistamine, and tylenol.    Honey is a natural cough suppressant that can be used.    Tylenol up to 4,000 mg a day is safe for short periods and can be used for body aches, pain, and fever. However in high doses and prolonged use it can cause liver irritation.    Ibuprofen is a non-steroidal anti-inflammatory that can be used for body aches, pain, and fever.However it can also cause stomach irritation if over used.     Follow up with your PCP or specialty clinic as instructed in the next 2-3 days if not improved or as needed. You can call [phone] to schedule an appointment with appropriate provider.      If you condition worsens, we recommend that you receive another evaluation at the emergency room immediately or contact your primary medical clinic's after hours call service to discuss your concerns.      Please return here or go to the Emergency Department for any concerns or worsening condition.   You can also call [phone] to schedule an appointment with the appropriate provider.    Please return here or go to the Emergency Department for any concerns or worsening of condition.    Thank you for choosing [facility]!    Our goal in the Urgent Care is to always provide outstanding medical care. You may receive a survey by mail or e-mail in the next week regarding your experience today. We would greatly appreciate you completing and returning the survey. Your feedback provides us with a way to recognize our staff who provide very good care, and it helps us learn how to improve when your experience was below our aspiration of excellence.      We appreciate you trusting us with your medical care. We hope you feel better soon. We will be happy to take care of you for all of your future medical needs.    Sincerely,    GREGG Ram     [phone]  [facility] [address]

## 2023-02-24 NOTE — LETTER
February 24, 2023      [facility]  [address], [address]  [place] [address]  Phone: [phone]  Fax: [phone]       Patient: Roly Candelaria   YOB: 1990  Date of Visit: 02/24/2023    To Whom It May Concern:    Ludivina Candelaria  was at [facility] on 02/24/2023. The patient may return to work/school on 2/27/2023 with no restrictions. If you have any questions or concerns, or if I can be of further assistance, please do not hesitate to contact me.    Sincerely,    Felicity Mcdaniels NP

## 2023-02-24 NOTE — PROGRESS NOTES
Subjective:       Patient ID: Roly Candealria is a 33 y.o. male.    Vitals:  weight is 107 kg (236 lb). His temperature is 99.2 °F (37.3 °C). His blood pressure is 124/84 and his pulse is 93. His respiration is 18 and oxygen saturation is 97%.     Chief Complaint: Sore Throat    Pt states he has been having a sore throat for three days , pt states he had fever two days ago , temp was unmeasured , pt has been taking OTC meds .  He has been able to tolerate PO, but with pain, hoarse voice, still has tonsils, denies sick contacts.     Sore Throat   This is a new problem. The current episode started in the past 7 days. The problem has been unchanged. Maximum temperature: unmeasured. The pain is at a severity of 10/10. The pain is severe. Associated symptoms include headaches, a hoarse voice and trouble swallowing. Pertinent negatives include no abdominal pain, congestion, coughing, diarrhea, drooling, ear discharge, ear pain, plugged ear sensation, neck pain, shortness of breath, stridor, swollen glands or vomiting. He has had no exposure to strep. He has tried gargles for the symptoms. The treatment provided no relief.     Constitution: Negative for activity change, appetite change, chills, sweating, fatigue, fever, unexpected weight change and generalized weakness.   HENT:  Positive for sore throat, trouble swallowing and voice change. Negative for ear pain, ear discharge, drooling and congestion.    Neck: Negative for neck pain.   Respiratory:  Negative for cough, shortness of breath and stridor.    Gastrointestinal:  Negative for abdominal pain, vomiting and diarrhea.   Neurological:  Positive for headaches.     Objective:      Physical Exam   Constitutional: He is oriented to person, place, and time. He appears well-developed.  Non-toxic appearance. He does not appear ill. No distress.   HENT:   Head: Normocephalic and atraumatic.   Ears:   Right Ear: External ear normal.   Left Ear: External ear normal.   Nose:  Nose normal.   Mouth/Throat: Uvula is midline and mucous membranes are normal. No trismus in the jaw. No uvula swelling. Posterior oropharyngeal erythema present. No oropharyngeal exudate, posterior oropharyngeal edema, tonsillar abscesses or cobblestoning. Tonsils are 2+ on the right. Tonsils are 2+ on the left. Tonsillar exudate.   Eyes: Conjunctivae, EOM and lids are normal. Pupils are equal, round, and reactive to light.   Neck: Trachea normal and phonation normal. Neck supple. No Brudzinski's sign and no Kernig's sign noted.   Musculoskeletal: Normal range of motion.         General: Normal range of motion.   Lymphadenopathy:     He has cervical adenopathy.   Neurological: He is alert and oriented to person, place, and time.   Skin: Skin is warm, dry, intact and not diaphoretic.   Psychiatric: His speech is normal and behavior is normal. Judgment and thought content normal.   Nursing note and vitals reviewed.      Assessment:       1. Strep throat    2. Sore throat        Results for orders placed or performed in visit on 02/24/23   POCT Strep A, Molecular   Result Value Ref Range    Molecular Strep A, POC Positive (A) Negative     Acceptable Yes       Plan:       Strep pos, rtw note provided  Advised on salt water gargles, throat lozenges, tea with honey, alternate tylenol and ibu for ha/body aches     Discussed results/diagnosis/plan with patient in clinic. Strict precautions given to patient to monitor for worsening signs and symptoms. Advised to follow up with PCP or specialist.    Explained side effects of medications prescribed with patient and informed him/her to discontinue use if he/she has any side effects and to inform UC or PCP if this occurs. All questions answered. Strict ED verses clinic return precautions stressed and given in depth. Advised if symptoms worsens of fail to improve he/she should go to the Emergency Room. Discharge and follow-up instructions given verbally/printed  with the patient who expressed understanding and willingness to comply with my recommendations. Patient voiced understanding and in agreement with current treatment plan. Patient exits the exam room in no acute distress. Conversant and engaged during discharge discussion, verbalized understanding.      Strep throat  -     amoxicillin (AMOXIL) 500 MG Tab; Take 1 tablet (500 mg total) by mouth every 12 (twelve) hours. for 10 days  Dispense: 20 tablet; Refill: 0  -     diphenhydrAMINE-aluminum-magnesium hydroxide-simethicone-LIDOcaine HCl 2%; Swish and spit 15 mLs every 4 (four) hours as needed (sore throat).  Dispense: 120 each; Refill: 0    Sore throat  -     POCT Strep A, Molecular  -     acetaminophen tablet 1,000 mg                 Patient Instructions   General Discharge Instructions   PLEASE READ YOUR DISCHARGE INSTRUCTIONS ENTIRELY AS IT CONTAINS IMPORTANT INFORMATION.  If you were prescribed a narcotic or controlled medication, do not drive or operate heavy equipment or machinery while taking these medications.  If you were prescribed antibiotics, please take them to completion.  You must understand that you've received an Urgent Care treatment only and that you may be released before all your medical problems are known or treated. You, the patient, will arrange for follow up care as instructed.    OVER THE COUNTER RECOMMENDATIONS/SUGGESTIONS.    Make sure to stay well hydrated.    Use Nasal Saline to mechanically move any post nasal drip from your eustachian tube or from the back of your throat.    Use warm salt water gargles to ease your throat pain. Warm salt water gargles as needed for sore throat- 1/2 tsp salt to 1 cup warm water, gargle as desired.    Use an antihistamine such as Claritin, Zyrtec or Allegra to dry you out.    Use pseudoephedrine (behind the counter) to decongest. Pseudoephedrine 30 mg up to 240 mg /day. It can raise your blood pressure and give you palpitations.    Use mucinex  (guaifenesin) to break up mucous up to 2400mg/day to loosen any mucous.    The mucinex DM pill has a cough suppressant that can be sedating. It can be used at night to stop the tickle at the back of your throat.    You can use Mucinex D (it has guaifenesin and a high dose of pseudoephedrine) in the mornings to help decongest.    Use Afrin in each nare for no longer than 3 days, as it is addictive. It can also dry out your mucous membranes and cause elevated blood pressure. This is especially useful if you are flying.    Use Flonase 1-2 sprays/nostril per day. It is a local acting steroid nasal spray, if you develop a bloody nose, stop using the medication immediately.    Sometimes Nyquil at night is beneficial to help you get some rest, however it is sedating and it does have an antihistamine, and tylenol.    Honey is a natural cough suppressant that can be used.    Tylenol up to 4,000 mg a day is safe for short periods and can be used for body aches, pain, and fever. However in high doses and prolonged use it can cause liver irritation.    Ibuprofen is a non-steroidal anti-inflammatory that can be used for body aches, pain, and fever.However it can also cause stomach irritation if over used.     Follow up with your PCP or specialty clinic as instructed in the next 2-3 days if not improved or as needed. You can call [phone] to schedule an appointment with appropriate provider.      If you condition worsens, we recommend that you receive another evaluation at the emergency room immediately or contact your primary medical clinic's after hours call service to discuss your concerns.      Please return here or go to the Emergency Department for any concerns or worsening condition.   You can also call [phone] to schedule an appointment with the appropriate provider.    Please return here or go to the Emergency Department for any concerns or worsening of condition.    Thank you for choosing [facility]!    Our goal in the Urgent Care is to always provide outstanding medical care. You may receive a survey by mail or e-mail in the next week regarding your experience today. We would greatly appreciate you completing and returning the survey. Your feedback provides us with a way to recognize our staff who provide very good care, and it helps us learn how to improve when your experience was below our aspiration of excellence.      We appreciate you trusting us with your medical care. We hope you feel better soon. We will be happy to take care of you for all of your future medical needs.    Sincerely,    GREGG Ram

## 2024-04-14 ENCOUNTER — HOSPITAL ENCOUNTER (INPATIENT)
Facility: HOSPITAL | Age: 34
LOS: 3 days | Discharge: HOME | DRG: 638 | End: 2024-04-19
Attending: STUDENT IN AN ORGANIZED HEALTH CARE EDUCATION/TRAINING PROGRAM | Admitting: HOSPITALIST
Payer: COMMERCIAL

## 2024-04-14 DIAGNOSIS — R73.9 HYPERGLYCEMIA: ICD-10-CM

## 2024-04-14 DIAGNOSIS — E87.5 HYPERKALEMIA: ICD-10-CM

## 2024-04-14 DIAGNOSIS — R07.9 CHEST PAIN: ICD-10-CM

## 2024-04-14 DIAGNOSIS — E11.10 DIABETIC KETOACIDOSIS WITHOUT COMA ASSOCIATED WITH TYPE 2 DIABETES MELLITUS: Primary | ICD-10-CM

## 2024-04-14 LAB
ALBUMIN SERPL BCP-MCNC: 4.2 G/DL (ref 3.5–5.2)
ALLENS TEST: ABNORMAL
ALP SERPL-CCNC: 168 U/L (ref 55–135)
ALT SERPL W/O P-5'-P-CCNC: 16 U/L (ref 10–44)
ANION GAP SERPL CALC-SCNC: 14 MMOL/L (ref 8–16)
AST SERPL-CCNC: 13 U/L (ref 10–40)
B-OH-BUTYR BLD STRIP-SCNC: 4.9 MMOL/L (ref 0–0.5)
BACTERIA #/AREA URNS HPF: NORMAL /HPF
BASOPHILS # BLD AUTO: 0.03 K/UL (ref 0–0.2)
BASOPHILS NFR BLD: 0.5 % (ref 0–1.9)
BILIRUB SERPL-MCNC: 0.4 MG/DL (ref 0.1–1)
BILIRUB UR QL STRIP: NEGATIVE
BUN SERPL-MCNC: 17 MG/DL (ref 6–20)
CALCIUM SERPL-MCNC: 9.6 MG/DL (ref 8.7–10.5)
CHLORIDE SERPL-SCNC: 97 MMOL/L (ref 95–110)
CLARITY UR: CLEAR
CO2 SERPL-SCNC: 16 MMOL/L (ref 23–29)
COLOR UR: COLORLESS
CREAT SERPL-MCNC: 2.1 MG/DL (ref 0.5–1.4)
DIFFERENTIAL METHOD BLD: NORMAL
EOSINOPHIL # BLD AUTO: 0.1 K/UL (ref 0–0.5)
EOSINOPHIL NFR BLD: 1.2 % (ref 0–8)
ERYTHROCYTE [DISTWIDTH] IN BLOOD BY AUTOMATED COUNT: 14.1 % (ref 11.5–14.5)
EST. GFR  (NO RACE VARIABLE): 41 ML/MIN/1.73 M^2
GLUCOSE SERPL-MCNC: 767 MG/DL (ref 70–110)
GLUCOSE UR QL STRIP: ABNORMAL
HCO3 UR-SCNC: 17.2 MMOL/L (ref 24–28)
HCT VFR BLD AUTO: 44.2 % (ref 40–54)
HGB BLD-MCNC: 14.2 G/DL (ref 14–18)
HGB UR QL STRIP: NEGATIVE
IMM GRANULOCYTES # BLD AUTO: 0.01 K/UL (ref 0–0.04)
IMM GRANULOCYTES NFR BLD AUTO: 0.2 % (ref 0–0.5)
KETONES UR QL STRIP: ABNORMAL
LEUKOCYTE ESTERASE UR QL STRIP: NEGATIVE
LYMPHOCYTES # BLD AUTO: 2 K/UL (ref 1–4.8)
LYMPHOCYTES NFR BLD: 33 % (ref 18–48)
MAGNESIUM SERPL-MCNC: 1.8 MG/DL (ref 1.6–2.6)
MCH RBC QN AUTO: 27.6 PG (ref 27–31)
MCHC RBC AUTO-ENTMCNC: 32.1 G/DL (ref 32–36)
MCV RBC AUTO: 86 FL (ref 82–98)
MICROSCOPIC COMMENT: NORMAL
MONOCYTES # BLD AUTO: 0.3 K/UL (ref 0.3–1)
MONOCYTES NFR BLD: 4.8 % (ref 4–15)
NEUTROPHILS # BLD AUTO: 3.6 K/UL (ref 1.8–7.7)
NEUTROPHILS NFR BLD: 60.3 % (ref 38–73)
NITRITE UR QL STRIP: NEGATIVE
NRBC BLD-RTO: 0 /100 WBC
PCO2 BLDA: 39 MMHG (ref 35–45)
PH SMN: 7.25 [PH] (ref 7.35–7.45)
PH UR STRIP: 5 [PH] (ref 5–8)
PLATELET # BLD AUTO: 270 K/UL (ref 150–450)
PMV BLD AUTO: 11.8 FL (ref 9.2–12.9)
PO2 BLDA: 26 MMHG (ref 40–60)
POC BE: -9 MMOL/L
POC SATURATED O2: 40 % (ref 95–100)
POC TCO2: 18 MMOL/L (ref 24–29)
POCT GLUCOSE: 280 MG/DL (ref 70–110)
POCT GLUCOSE: >500 MG/DL (ref 70–110)
POCT GLUCOSE: >500 MG/DL (ref 70–110)
POTASSIUM SERPL-SCNC: 4.3 MMOL/L (ref 3.5–5.1)
PROT SERPL-MCNC: 8 G/DL (ref 6–8.4)
PROT UR QL STRIP: NEGATIVE
RBC # BLD AUTO: 5.15 M/UL (ref 4.6–6.2)
SAMPLE: ABNORMAL
SITE: ABNORMAL
SODIUM SERPL-SCNC: 127 MMOL/L (ref 136–145)
SP GR UR STRIP: 1.02 (ref 1–1.03)
URN SPEC COLLECT METH UR: ABNORMAL
UROBILINOGEN UR STRIP-ACNC: NEGATIVE EU/DL
WBC # BLD AUTO: 6.03 K/UL (ref 3.9–12.7)
YEAST URNS QL MICRO: NORMAL

## 2024-04-14 PROCEDURE — 99900035 HC TECH TIME PER 15 MIN (STAT)

## 2024-04-14 PROCEDURE — 82043 UR ALBUMIN QUANTITATIVE: CPT

## 2024-04-14 PROCEDURE — G0378 HOSPITAL OBSERVATION PER HR: HCPCS

## 2024-04-14 PROCEDURE — 80053 COMPREHEN METABOLIC PANEL: CPT | Performed by: NURSE PRACTITIONER

## 2024-04-14 PROCEDURE — 96361 HYDRATE IV INFUSION ADD-ON: CPT

## 2024-04-14 PROCEDURE — 25000003 PHARM REV CODE 250: Performed by: STUDENT IN AN ORGANIZED HEALTH CARE EDUCATION/TRAINING PROGRAM

## 2024-04-14 PROCEDURE — 93010 ELECTROCARDIOGRAM REPORT: CPT | Mod: ,,, | Performed by: INTERNAL MEDICINE

## 2024-04-14 PROCEDURE — 82800 BLOOD PH: CPT

## 2024-04-14 PROCEDURE — 63600175 PHARM REV CODE 636 W HCPCS: Performed by: STUDENT IN AN ORGANIZED HEALTH CARE EDUCATION/TRAINING PROGRAM

## 2024-04-14 PROCEDURE — 82962 GLUCOSE BLOOD TEST: CPT

## 2024-04-14 PROCEDURE — 99285 EMERGENCY DEPT VISIT HI MDM: CPT | Mod: 25

## 2024-04-14 PROCEDURE — 85025 COMPLETE CBC W/AUTO DIFF WBC: CPT | Performed by: NURSE PRACTITIONER

## 2024-04-14 PROCEDURE — 83036 HEMOGLOBIN GLYCOSYLATED A1C: CPT | Performed by: PHYSICIAN ASSISTANT

## 2024-04-14 PROCEDURE — 84443 ASSAY THYROID STIM HORMONE: CPT | Performed by: NURSE PRACTITIONER

## 2024-04-14 PROCEDURE — 82570 ASSAY OF URINE CREATININE: CPT

## 2024-04-14 PROCEDURE — 83735 ASSAY OF MAGNESIUM: CPT | Performed by: PHYSICIAN ASSISTANT

## 2024-04-14 PROCEDURE — 93005 ELECTROCARDIOGRAM TRACING: CPT

## 2024-04-14 PROCEDURE — 81000 URINALYSIS NONAUTO W/SCOPE: CPT

## 2024-04-14 PROCEDURE — 82803 BLOOD GASES ANY COMBINATION: CPT

## 2024-04-14 PROCEDURE — 96365 THER/PROPH/DIAG IV INF INIT: CPT

## 2024-04-14 PROCEDURE — 82010 KETONE BODYS QUAN: CPT | Performed by: NURSE PRACTITIONER

## 2024-04-14 RX ORDER — SODIUM CHLORIDE 0.9 % (FLUSH) 0.9 %
10 SYRINGE (ML) INJECTION
Status: DISCONTINUED | OUTPATIENT
Start: 2024-04-14 | End: 2024-04-19 | Stop reason: HOSPADM

## 2024-04-14 RX ORDER — DEXTROSE MONOHYDRATE 100 MG/ML
INJECTION, SOLUTION INTRAVENOUS
Status: DISCONTINUED | OUTPATIENT
Start: 2024-04-14 | End: 2024-04-19 | Stop reason: HOSPADM

## 2024-04-14 RX ORDER — DEXTROSE MONOHYDRATE AND SODIUM CHLORIDE 5; .45 G/100ML; G/100ML
INJECTION, SOLUTION INTRAVENOUS CONTINUOUS PRN
Status: DISCONTINUED | OUTPATIENT
Start: 2024-04-14 | End: 2024-04-19 | Stop reason: HOSPADM

## 2024-04-14 RX ORDER — SODIUM CHLORIDE 9 MG/ML
1000 INJECTION, SOLUTION INTRAVENOUS CONTINUOUS
Status: DISCONTINUED | OUTPATIENT
Start: 2024-04-14 | End: 2024-04-14

## 2024-04-14 RX ORDER — INSULIN ASPART 100 [IU]/ML
10 INJECTION, SOLUTION INTRAVENOUS; SUBCUTANEOUS ONCE
Status: DISCONTINUED | OUTPATIENT
Start: 2024-04-14 | End: 2024-04-14

## 2024-04-14 RX ADMIN — SODIUM CHLORIDE 1000 ML: 9 INJECTION, SOLUTION INTRAVENOUS at 08:04

## 2024-04-14 RX ADMIN — INSULIN HUMAN 0.1 UNITS/KG/HR: 1 INJECTION, SOLUTION INTRAVENOUS at 09:04

## 2024-04-14 RX ADMIN — SODIUM CHLORIDE, POTASSIUM CHLORIDE, SODIUM LACTATE AND CALCIUM CHLORIDE 1000 ML: 600; 310; 30; 20 INJECTION, SOLUTION INTRAVENOUS at 08:04

## 2024-04-14 NOTE — LETTER
April 19, 2024         Luz IVAN  [facility]  [place] [address]  Phone: [phone]  Fax: [phone]       Patient: Roly Candelaria   YOB: 1990  Date of Visit: 04/19/2024    To Whom It May Concern:    Abe Candelaria was hospitalized at [facility] from 04/14/24 - 04/19/2024. The patient may return to work on 04/30/24 with no restrictions. If you have any questions or concerns, or if I can be of further assistance, please do not hesitate to contact me.    Sincerely,    Shun Burkett MD

## 2024-04-15 PROBLEM — N17.9 AKI (ACUTE KIDNEY INJURY): Status: ACTIVE | Noted: 2024-04-15

## 2024-04-15 PROBLEM — E87.1 HYPONATREMIA: Status: ACTIVE | Noted: 2024-04-15

## 2024-04-15 LAB
ALBUMIN/CREAT UR: ABNORMAL UG/MG (ref 0–30)
ALLENS TEST: ABNORMAL
ALLENS TEST: ABNORMAL
ANION GAP SERPL CALC-SCNC: 14 MMOL/L (ref 8–16)
ANION GAP SERPL CALC-SCNC: 18 MMOL/L (ref 8–16)
B-OH-BUTYR BLD STRIP-SCNC: 4.1 MMOL/L (ref 0–0.5)
BASOPHILS # BLD AUTO: 0.05 K/UL (ref 0–0.2)
BASOPHILS NFR BLD: 0.8 % (ref 0–1.9)
BUN SERPL-MCNC: 11 MG/DL (ref 6–20)
BUN SERPL-MCNC: 13 MG/DL (ref 6–30)
CALCIUM SERPL-MCNC: 9 MG/DL (ref 8.7–10.5)
CHLORIDE SERPL-SCNC: 105 MMOL/L (ref 95–110)
CHLORIDE SERPL-SCNC: 107 MMOL/L (ref 95–110)
CO2 SERPL-SCNC: 17 MMOL/L (ref 23–29)
CREAT SERPL-MCNC: 1 MG/DL (ref 0.5–1.4)
CREAT SERPL-MCNC: 1.3 MG/DL (ref 0.5–1.4)
CREAT UR-MCNC: 18.6 MG/DL (ref 23–375)
CREAT UR-MCNC: 18.6 MG/DL (ref 23–375)
DIFFERENTIAL METHOD BLD: ABNORMAL
EOSINOPHIL # BLD AUTO: 0.1 K/UL (ref 0–0.5)
EOSINOPHIL NFR BLD: 1.4 % (ref 0–8)
ERYTHROCYTE [DISTWIDTH] IN BLOOD BY AUTOMATED COUNT: 13.5 % (ref 11.5–14.5)
ERYTHROCYTE [DISTWIDTH] IN BLOOD BY AUTOMATED COUNT: 13.5 % (ref 11.5–14.5)
EST. GFR  (NO RACE VARIABLE): >60 ML/MIN/1.73 M^2
ESTIMATED AVG GLUCOSE: ABNORMAL MG/DL (ref 68–131)
ESTIMATED AVG GLUCOSE: ABNORMAL MG/DL (ref 68–131)
GLUCOSE SERPL-MCNC: 286 MG/DL (ref 70–110)
GLUCOSE SERPL-MCNC: 312 MG/DL (ref 70–110)
HBA1C MFR BLD: >14 % (ref 4–5.6)
HBA1C MFR BLD: >14 % (ref 4–5.6)
HCO3 UR-SCNC: 19.6 MMOL/L (ref 24–28)
HCT VFR BLD AUTO: 40.8 % (ref 40–54)
HCT VFR BLD AUTO: 40.8 % (ref 40–54)
HCT VFR BLD CALC: 43 %PCV (ref 36–54)
HGB BLD-MCNC: 13.3 G/DL (ref 14–18)
HGB BLD-MCNC: 13.3 G/DL (ref 14–18)
IMM GRANULOCYTES # BLD AUTO: 0.02 K/UL (ref 0–0.04)
IMM GRANULOCYTES NFR BLD AUTO: 0.3 % (ref 0–0.5)
LYMPHOCYTES # BLD AUTO: 3 K/UL (ref 1–4.8)
LYMPHOCYTES NFR BLD: 47.2 % (ref 18–48)
MAGNESIUM SERPL-MCNC: 1.7 MG/DL (ref 1.6–2.6)
MCH RBC QN AUTO: 27.5 PG (ref 27–31)
MCH RBC QN AUTO: 27.5 PG (ref 27–31)
MCHC RBC AUTO-ENTMCNC: 32.6 G/DL (ref 32–36)
MCHC RBC AUTO-ENTMCNC: 32.6 G/DL (ref 32–36)
MCV RBC AUTO: 84 FL (ref 82–98)
MCV RBC AUTO: 84 FL (ref 82–98)
MICROALBUMIN UR DL<=1MG/L-MCNC: <5 UG/ML
MONOCYTES # BLD AUTO: 0.3 K/UL (ref 0.3–1)
MONOCYTES NFR BLD: 5 % (ref 4–15)
NEUTROPHILS # BLD AUTO: 2.9 K/UL (ref 1.8–7.7)
NEUTROPHILS NFR BLD: 45.3 % (ref 38–73)
NRBC BLD-RTO: 0 /100 WBC
OHS QRS DURATION: 82 MS
OHS QTC CALCULATION: 390 MS
PCO2 BLDA: 38.2 MMHG (ref 35–45)
PH SMN: 7.32 [PH] (ref 7.35–7.45)
PHOSPHATE SERPL-MCNC: 3 MG/DL (ref 2.7–4.5)
PLATELET # BLD AUTO: 267 K/UL (ref 150–450)
PLATELET # BLD AUTO: 267 K/UL (ref 150–450)
PMV BLD AUTO: 11.5 FL (ref 9.2–12.9)
PMV BLD AUTO: 11.5 FL (ref 9.2–12.9)
PO2 BLDA: 32 MMHG (ref 40–60)
POC BE: -6 MMOL/L
POC IONIZED CALCIUM: 1.27 MMOL/L (ref 1.06–1.42)
POC SATURATED O2: 57 % (ref 95–100)
POC TCO2 (MEASURED): 19 MMOL/L (ref 23–29)
POC TCO2: 21 MMOL/L (ref 24–29)
POCT GLUCOSE: 280 MG/DL (ref 70–110)
POCT GLUCOSE: 295 MG/DL (ref 70–110)
POCT GLUCOSE: 311 MG/DL (ref 70–110)
POCT GLUCOSE: 312 MG/DL (ref 70–110)
POTASSIUM BLD-SCNC: 3.7 MMOL/L (ref 3.5–5.1)
POTASSIUM SERPL-SCNC: 3.6 MMOL/L (ref 3.5–5.1)
PROT UR-MCNC: <7 MG/DL
PROT/CREAT UR: ABNORMAL MG/G{CREAT} (ref 0–0.2)
RBC # BLD AUTO: 4.84 M/UL (ref 4.6–6.2)
RBC # BLD AUTO: 4.84 M/UL (ref 4.6–6.2)
SAMPLE: ABNORMAL
SAMPLE: ABNORMAL
SITE: ABNORMAL
SITE: ABNORMAL
SODIUM BLD-SCNC: 136 MMOL/L (ref 136–145)
SODIUM SERPL-SCNC: 138 MMOL/L (ref 136–145)
TSH SERPL DL<=0.005 MIU/L-ACNC: 0.97 UIU/ML (ref 0.4–4)
WBC # BLD AUTO: 6.4 K/UL (ref 3.9–12.7)
WBC # BLD AUTO: 6.4 K/UL (ref 3.9–12.7)

## 2024-04-15 PROCEDURE — 82565 ASSAY OF CREATININE: CPT

## 2024-04-15 PROCEDURE — 96372 THER/PROPH/DIAG INJ SC/IM: CPT | Performed by: STUDENT IN AN ORGANIZED HEALTH CARE EDUCATION/TRAINING PROGRAM

## 2024-04-15 PROCEDURE — 63600175 PHARM REV CODE 636 W HCPCS: Performed by: STUDENT IN AN ORGANIZED HEALTH CARE EDUCATION/TRAINING PROGRAM

## 2024-04-15 PROCEDURE — 84295 ASSAY OF SERUM SODIUM: CPT

## 2024-04-15 PROCEDURE — 99900035 HC TECH TIME PER 15 MIN (STAT)

## 2024-04-15 PROCEDURE — G0378 HOSPITAL OBSERVATION PER HR: HCPCS

## 2024-04-15 PROCEDURE — 85025 COMPLETE CBC W/AUTO DIFF WBC: CPT | Performed by: STUDENT IN AN ORGANIZED HEALTH CARE EDUCATION/TRAINING PROGRAM

## 2024-04-15 PROCEDURE — 82330 ASSAY OF CALCIUM: CPT

## 2024-04-15 PROCEDURE — 84132 ASSAY OF SERUM POTASSIUM: CPT

## 2024-04-15 PROCEDURE — 82010 KETONE BODYS QUAN: CPT | Performed by: NURSE PRACTITIONER

## 2024-04-15 PROCEDURE — 25000003 PHARM REV CODE 250: Performed by: STUDENT IN AN ORGANIZED HEALTH CARE EDUCATION/TRAINING PROGRAM

## 2024-04-15 PROCEDURE — 83036 HEMOGLOBIN GLYCOSYLATED A1C: CPT | Performed by: STUDENT IN AN ORGANIZED HEALTH CARE EDUCATION/TRAINING PROGRAM

## 2024-04-15 PROCEDURE — 85014 HEMATOCRIT: CPT

## 2024-04-15 PROCEDURE — 83735 ASSAY OF MAGNESIUM: CPT | Performed by: STUDENT IN AN ORGANIZED HEALTH CARE EDUCATION/TRAINING PROGRAM

## 2024-04-15 PROCEDURE — 84100 ASSAY OF PHOSPHORUS: CPT | Performed by: STUDENT IN AN ORGANIZED HEALTH CARE EDUCATION/TRAINING PROGRAM

## 2024-04-15 PROCEDURE — 82962 GLUCOSE BLOOD TEST: CPT

## 2024-04-15 PROCEDURE — 82800 BLOOD PH: CPT

## 2024-04-15 PROCEDURE — 82803 BLOOD GASES ANY COMBINATION: CPT

## 2024-04-15 PROCEDURE — 80048 BASIC METABOLIC PNL TOTAL CA: CPT | Performed by: STUDENT IN AN ORGANIZED HEALTH CARE EDUCATION/TRAINING PROGRAM

## 2024-04-15 RX ORDER — TALC
6 POWDER (GRAM) TOPICAL NIGHTLY PRN
Status: DISCONTINUED | OUTPATIENT
Start: 2024-04-15 | End: 2024-04-19 | Stop reason: HOSPADM

## 2024-04-15 RX ORDER — POLYETHYLENE GLYCOL 3350 17 G/17G
17 POWDER, FOR SOLUTION ORAL DAILY
Status: DISCONTINUED | OUTPATIENT
Start: 2024-04-15 | End: 2024-04-16

## 2024-04-15 RX ORDER — INSULIN ASPART 100 [IU]/ML
2 INJECTION, SOLUTION INTRAVENOUS; SUBCUTANEOUS
Status: DISCONTINUED | OUTPATIENT
Start: 2024-04-15 | End: 2024-04-16

## 2024-04-15 RX ORDER — IBUPROFEN 200 MG
24 TABLET ORAL
Status: DISCONTINUED | OUTPATIENT
Start: 2024-04-15 | End: 2024-04-19 | Stop reason: HOSPADM

## 2024-04-15 RX ORDER — LANOLIN ALCOHOL/MO/W.PET/CERES
800 CREAM (GRAM) TOPICAL
Status: DISCONTINUED | OUTPATIENT
Start: 2024-04-15 | End: 2024-04-19 | Stop reason: HOSPADM

## 2024-04-15 RX ORDER — ACETAMINOPHEN 325 MG/1
650 TABLET ORAL EVERY 8 HOURS PRN
Status: DISCONTINUED | OUTPATIENT
Start: 2024-04-15 | End: 2024-04-19 | Stop reason: HOSPADM

## 2024-04-15 RX ORDER — ONDANSETRON HYDROCHLORIDE 2 MG/ML
4 INJECTION, SOLUTION INTRAVENOUS EVERY 8 HOURS PRN
Status: DISCONTINUED | OUTPATIENT
Start: 2024-04-15 | End: 2024-04-19 | Stop reason: HOSPADM

## 2024-04-15 RX ORDER — SIMETHICONE 80 MG
1 TABLET,CHEWABLE ORAL 4 TIMES DAILY PRN
Status: DISCONTINUED | OUTPATIENT
Start: 2024-04-15 | End: 2024-04-19 | Stop reason: HOSPADM

## 2024-04-15 RX ORDER — NALOXONE HCL 0.4 MG/ML
0.02 VIAL (ML) INJECTION
Status: DISCONTINUED | OUTPATIENT
Start: 2024-04-15 | End: 2024-04-19 | Stop reason: HOSPADM

## 2024-04-15 RX ORDER — SODIUM CHLORIDE 0.9 % (FLUSH) 0.9 %
10 SYRINGE (ML) INJECTION
Status: DISCONTINUED | OUTPATIENT
Start: 2024-04-15 | End: 2024-04-19 | Stop reason: HOSPADM

## 2024-04-15 RX ORDER — IBUPROFEN 200 MG
16 TABLET ORAL
Status: DISCONTINUED | OUTPATIENT
Start: 2024-04-15 | End: 2024-04-19 | Stop reason: HOSPADM

## 2024-04-15 RX ORDER — ONDANSETRON 8 MG/1
8 TABLET, ORALLY DISINTEGRATING ORAL EVERY 8 HOURS PRN
Status: DISCONTINUED | OUTPATIENT
Start: 2024-04-15 | End: 2024-04-19 | Stop reason: HOSPADM

## 2024-04-15 RX ORDER — SODIUM CHLORIDE, SODIUM LACTATE, POTASSIUM CHLORIDE, CALCIUM CHLORIDE 600; 310; 30; 20 MG/100ML; MG/100ML; MG/100ML; MG/100ML
INJECTION, SOLUTION INTRAVENOUS CONTINUOUS
Status: ACTIVE | OUTPATIENT
Start: 2024-04-15 | End: 2024-04-15

## 2024-04-15 RX ORDER — SODIUM,POTASSIUM PHOSPHATES 280-250MG
2 POWDER IN PACKET (EA) ORAL
Status: DISCONTINUED | OUTPATIENT
Start: 2024-04-15 | End: 2024-04-16

## 2024-04-15 RX ORDER — INSULIN ASPART 100 [IU]/ML
0-10 INJECTION, SOLUTION INTRAVENOUS; SUBCUTANEOUS
Status: DISCONTINUED | OUTPATIENT
Start: 2024-04-15 | End: 2024-04-19 | Stop reason: HOSPADM

## 2024-04-15 RX ORDER — ACETAMINOPHEN 325 MG/1
650 TABLET ORAL EVERY 4 HOURS PRN
Status: DISCONTINUED | OUTPATIENT
Start: 2024-04-15 | End: 2024-04-15

## 2024-04-15 RX ORDER — ACETAMINOPHEN 500 MG
1000 TABLET ORAL EVERY 8 HOURS PRN
Status: DISCONTINUED | OUTPATIENT
Start: 2024-04-15 | End: 2024-04-19 | Stop reason: HOSPADM

## 2024-04-15 RX ORDER — GLUCAGON 1 MG
1 KIT INJECTION
Status: DISCONTINUED | OUTPATIENT
Start: 2024-04-15 | End: 2024-04-19 | Stop reason: HOSPADM

## 2024-04-15 RX ORDER — LANOLIN ALCOHOL/MO/W.PET/CERES
800 CREAM (GRAM) TOPICAL
Status: DISCONTINUED | OUTPATIENT
Start: 2024-04-15 | End: 2024-04-16

## 2024-04-15 RX ADMIN — INSULIN DETEMIR 5 UNITS: 100 INJECTION, SOLUTION SUBCUTANEOUS at 09:04

## 2024-04-15 RX ADMIN — INSULIN ASPART 2 UNITS: 100 INJECTION, SOLUTION INTRAVENOUS; SUBCUTANEOUS at 04:04

## 2024-04-15 RX ADMIN — INSULIN DETEMIR 5 UNITS: 100 INJECTION, SOLUTION SUBCUTANEOUS at 08:04

## 2024-04-15 RX ADMIN — INSULIN ASPART 6 UNITS: 100 INJECTION, SOLUTION INTRAVENOUS; SUBCUTANEOUS at 05:04

## 2024-04-15 RX ADMIN — SIMETHICONE 80 MG: 80 TABLET, CHEWABLE ORAL at 06:04

## 2024-04-15 RX ADMIN — INSULIN ASPART 8 UNITS: 100 INJECTION, SOLUTION INTRAVENOUS; SUBCUTANEOUS at 04:04

## 2024-04-15 RX ADMIN — INSULIN ASPART 2 UNITS: 100 INJECTION, SOLUTION INTRAVENOUS; SUBCUTANEOUS at 12:04

## 2024-04-15 RX ADMIN — SODIUM CHLORIDE, POTASSIUM CHLORIDE, SODIUM LACTATE AND CALCIUM CHLORIDE: 600; 310; 30; 20 INJECTION, SOLUTION INTRAVENOUS at 02:04

## 2024-04-15 RX ADMIN — INSULIN ASPART 3 UNITS: 100 INJECTION, SOLUTION INTRAVENOUS; SUBCUTANEOUS at 08:04

## 2024-04-15 RX ADMIN — INSULIN ASPART 6 UNITS: 100 INJECTION, SOLUTION INTRAVENOUS; SUBCUTANEOUS at 12:04

## 2024-04-15 NOTE — ASSESSMENT & PLAN NOTE
Patient with acute kidney injury/acute renal failure likely due to pre-renal azotemia due to IVVD in the setting of osmotic diuresis.  ANA is currently stable. Baseline creatinine  1 years we will every day was pain  - Labs reviewed- Renal function/electrolytes with Estimated Creatinine Clearance: 55.7 mL/min (A) (based on SCr of 2.1 mg/dL (H)). according to latest data.   Monitor urine output and serial BMP and adjust therapy as needed.   Avoid nephrotoxins and renally dose meds for GFR listed above.

## 2024-04-15 NOTE — ASSESSMENT & PLAN NOTE
Body mass index is 33.45 kg/m².   Morbid obesity complicates all aspects of disease management from diagnostic modalities to treatment.   Weight loss encouraged and health benefits explained to patient.

## 2024-04-15 NOTE — NURSING
PT REFUSED INSULIN UNTIL SEEN BY MD, MESSAGE SENT,  MG/DL, WRITER EXPLAINED RISKS OF HIGH BLOOD SUGARS

## 2024-04-15 NOTE — ADMISSIONCARE
AdmissionCare    Guideline: Diabetes - OBS, Observation    Based on the indications selected for the patient, the bed status of Observation was determined to be MET    The following indications were selected as present at the time of evaluation of the patient:      - Diabetic ketoacidosis appropriate for observation care, as indicated by ALL of the following:    - Hyperglycemia (eg, plasma glucose greater than 200 mg/dL (11.10 mmol/L))    - Ketonuria or ketonemia (eg, serum beta hydroxybutyrate greater than 3 mmol/L, or moderate to large ketonuria)    - Acidosis (eg, arterial or venous pH less than 7.30, or serum bicarbonate less than 15 mEq/L (mmol/L))    - Observation care management appropriate, as indicated by ALL of the following:     - Hypotension absent, as indicated by 1 or more of the following:      - SBP greater than or equal to 90 mm Hg in adult or child 10 years or older     - Arterial or venous pH not less than 7.0 (7.20 in children)     - Serum bicarbonate not less than 10 mEq/L (mmol/L)     - Mental status at baseline     - No severe dehydration     - Patient not pregnant     - Etiology of DKA presumed to be missed insulin doses (eg, not infection)     - Patient has known established outpatient insulin regimen (eg, not newly diagnosed diabetes).    AdmissionCare documentation entered by: Maribel Tong    [facility] [place], 27th edition, Copyright © 2023 [facility], [facility] All Rights Reserved.  [id number]:17:26-05:00

## 2024-04-15 NOTE — NURSING
[facility] [place] -- 4 [place]      4/15/2024       Skin assessed on: Admit      [x] No Pressure Injuries Present    []Prevention Measures Documented    [] Yes LDA  for Pressure Injury Previously documented     [] Yes New Pressure Injury Discovered   [] LDA for New Pressure Injury Added      Attending RN:  Dl Linares RN     Second RN: JULI Blevins

## 2024-04-15 NOTE — HPI
34-year-old  male with medical history significant for type 2 diabetes mellitus-diagnosed January of 2023, and class 1 obesity who presented to the emergency department to get checked out for polyuria, polydipsia and fatigue. He was diagnosed with type 2 diabetes mellitus about 15 months ago but decided on lifestyle modification for his treatment, he voiced losing about 15-20 lb, with improved diet and physical activity but symptoms of polyuria, polydipsia and generalized fatigue were noticed about 3 weeks ago, worsening hence he presented to the emergency room today.  He denied symptoms of dysuria, urgency, straining at micturition or hematuria.  He has not had any fever, chills or rigors.  Denied nausea, no change in bowel habits, no vomiting, diarrhea or constipation.  He has not had any cough, no chest pain, shortness for breath, no orthopnea, no paroxysmal nocturnal dyspnea or pedal swelling.  He denied cigarette smoking but occasionally drinks alcohol. He denied heat or cold intolerance, No past surgical history.  Lab obtained at presentation showed point of care glucose greater than 500, urinalysis showed colorless urine, 4+ glucose, 2+ of ketones.  Beta hydroxybutyrate 4.9, H&H of 14.2/44.2, BUN/creatinine 17/2.1.  Serum bicarbonate of 16.  Chest x-ray showed no acute cardiopulmonary process.

## 2024-04-15 NOTE — ED PROVIDER NOTES
Encounter Date: 4/14/2024       History     Chief Complaint   Patient presents with    Urinary Frequency     Has been having a dry throat, increased thirst & frequent urination for 3 weeks, no hx DM, CBG >500 in triage     HPI    34-year-old man with DM, not on any medication, presenting with polydipsia, polyuria x3 weeks.    Patient reports that he was diagnosed with diabetes last year, was taking metformin for several months, when he stopped having polyuria/polydipsia, so thought he had gotten better, and stopped taking his metformin.  Reports he has been attempting to manage his diabetes with lifestyle changes, and diet changes.    Denies any headache/nausea/vomiting/difficulty breathing, or recent illness, no rashes, no blurry vision or numbness in his feet.  Review of patient's allergies indicates:  No Known Allergies  No past medical history on file.  No past surgical history on file.  No family history on file.  Social History     Tobacco Use    Smoking status: Never    Smokeless tobacco: Never   Substance Use Topics    Alcohol use: Yes     Comment: occasionally    Drug use: No     Review of Systems    Physical Exam     Initial Vitals [04/14/24 1848]   BP Pulse Resp Temp SpO2   127/81 80 (!) 22 98.4 °F (36.9 °C) 99 %      MAP       --         Physical Exam    Constitutional: He appears well-developed and well-nourished.   HENT:   Head: Normocephalic and atraumatic.   Eyes: EOM are normal.   Cardiovascular:  Normal rate and regular rhythm.           Pulmonary/Chest: Effort normal.   Mild tachypnea, no jacobo Kussmaul breathing   Abdominal: Abdomen is soft. He exhibits no distension. There is no abdominal tenderness.   Musculoskeletal:         General: No tenderness or edema.     Neurological: He is alert and oriented to person, place, and time.   Normal sensation in bilateral feet, mental status at baseline   Skin: Skin is warm and dry.   Psychiatric: He has a normal mood and affect.         ED Course    Procedures  Labs Reviewed   URINALYSIS, REFLEX TO URINE CULTURE - Abnormal; Notable for the following components:       Result Value    Color, UA Colorless (*)     Glucose, UA 4+ (*)     Ketones, UA 2+ (*)     All other components within normal limits    Narrative:     Specimen Source->Urine   COMPREHENSIVE METABOLIC PANEL - Abnormal; Notable for the following components:    Sodium 127 (*)     CO2 16 (*)     Glucose 767 (*)     Creatinine 2.1 (*)     Alkaline Phosphatase 168 (*)     eGFR 41 (*)     All other components within normal limits    Narrative:     Glu. Critical result called and verbal readback obtained from JULI Oglesby   @ 2101 on 14Apr2024. BML by BL1 04/14/2024 21:01   BETA - HYDROXYBUTYRATE, SERUM - Abnormal; Notable for the following components:    Beta-Hydroxybutyrate 4.9 (*)     All other components within normal limits   BASIC METABOLIC PANEL - Abnormal; Notable for the following components:    CO2 17 (*)     Glucose 286 (*)     All other components within normal limits   CBC W/ AUTO DIFFERENTIAL - Abnormal; Notable for the following components:    Hemoglobin 13.3 (*)     All other components within normal limits   CBC WITHOUT DIFFERENTIAL - Abnormal; Notable for the following components:    Hemoglobin 13.3 (*)     All other components within normal limits   MICROALBUMIN / CREATININE RATIO URINE - Abnormal; Notable for the following components:    Creatinine, Urine 18.6 (*)     All other components within normal limits    Narrative:     Specimen Source->Urine   PROTEIN / CREATININE RATIO, URINE - Abnormal; Notable for the following components:    Creatinine, Urine 18.6 (*)     All other components within normal limits    Narrative:     Specimen Source->Urine   POCT GLUCOSE - Abnormal; Notable for the following components:    POCT Glucose >500 (*)     All other components within normal limits   ISTAT PROCEDURE - Abnormal; Notable for the following components:    POC PH 7.252 (*)     POC PO2 26 (*)      POC HCO3 17.2 (*)     POC BE -9 (*)     POC TCO2 18 (*)     All other components within normal limits   POCT GLUCOSE - Abnormal; Notable for the following components:    POCT Glucose >500 (*)     All other components within normal limits   POCT GLUCOSE - Abnormal; Notable for the following components:    POCT Glucose 280 (*)     All other components within normal limits   ISTAT PROCEDURE - Abnormal; Notable for the following components:    POC Glucose 312 (*)     POC TCO2 (MEASURED) 19 (*)     POC Anion Gap 18 (*)     All other components within normal limits   ISTAT PROCEDURE - Abnormal; Notable for the following components:    POC PH 7.319 (*)     POC PO2 32 (*)     POC HCO3 19.6 (*)     POC BE -6 (*)     POC TCO2 21 (*)     All other components within normal limits   POCT GLUCOSE - Abnormal; Notable for the following components:    POCT Glucose 311 (*)     All other components within normal limits   CBC W/ AUTO DIFFERENTIAL   MAGNESIUM   URINALYSIS MICROSCOPIC    Narrative:     Specimen Source->Urine   TSH   TSH   MAGNESIUM   PHOSPHORUS   PROTEIN / CREATININE RATIO, URINE   MICROALBUMIN / CREATININE RATIO URINE   HEMOGLOBIN A1C   HEMOGLOBIN A1C   BETA - HYDROXYBUTYRATE, SERUM   POCT GLUCOSE, HAND-HELD DEVICE   POCT GLUCOSE, HAND-HELD DEVICE   POCT GLUCOSE, HAND-HELD DEVICE   POCT GLUCOSE MONITORING CONTINUOUS   ISTAT CHEM8          Imaging Results              X-Ray Chest AP Portable (Final result)  Result time 04/14/24 19:15:47      Final result by Babar Gonzalez MD (04/14/24 19:15:47)                   Impression:      No acute process.      Electronically signed by: Babar Gonzalez MD  Date:    04/14/2024  Time:    19:15               Narrative:    EXAMINATION:  XR CHEST AP PORTABLE    CLINICAL HISTORY:  hyperglycemia;    TECHNIQUE:  Single frontal view of the chest was performed.    COMPARISON:  02/12/2022.    FINDINGS:  The trachea is unremarkable.  The cardiomediastinal silhouette is within normal limits.  The  hilar structures are unremarkable.  There is no evidence of free air beneath hemidiaphragms.  There are no pleural effusions.  There is no evidence of a pneumothorax.  There is no evidence of pneumomediastinum.  No airspace opacity is present.  The osseous structures are unremarkable.                                       Medications   sodium chloride 0.9% flush 10 mL (has no administration in time range)   dextrose 5 % and 0.45 % NaCl infusion (has no administration in time range)   dextrose 50% injection 25 g (has no administration in time range)   dextrose 10 % infusion (has no administration in time range)   dextrose 50% injection 12.5 g (has no administration in time range)   dextrose 10 % infusion (has no administration in time range)   sodium chloride 0.9% flush 10 mL (has no administration in time range)   acetaminophen tablet 650 mg (has no administration in time range)   naloxone 0.4 mg/mL injection 0.02 mg (has no administration in time range)   glucose chewable tablet 16 g (has no administration in time range)   glucose chewable tablet 24 g (has no administration in time range)   glucagon (human recombinant) injection 1 mg (has no administration in time range)   potassium bicarbonate disintegrating tablet 60 mEq (has no administration in time range)   potassium bicarbonate disintegrating tablet 35 mEq (has no administration in time range)   potassium bicarbonate disintegrating tablet 50 mEq (has no administration in time range)   magnesium oxide tablet 800 mg (has no administration in time range)   magnesium oxide tablet 800 mg (has no administration in time range)   potassium, sodium phosphates 280-160-250 mg packet 2 packet (has no administration in time range)   potassium, sodium phosphates 280-160-250 mg packet 2 packet (has no administration in time range)   potassium, sodium phosphates 280-160-250 mg packet 2 packet (has no administration in time range)   acetaminophen tablet 1,000 mg (has no  administration in time range)   polyethylene glycol packet 17 g (has no administration in time range)   ondansetron disintegrating tablet 8 mg (has no administration in time range)   ondansetron injection 4 mg (has no administration in time range)   insulin detemir U-100 (Levemir) pen 5 Units (has no administration in time range)   insulin aspart U-100 pen 0-10 Units (6 Units Subcutaneous Given 4/15/24 0532)   melatonin tablet 6 mg (has no administration in time range)   simethicone chewable tablet 80 mg (has no administration in time range)   dextrose 10% bolus 125 mL 125 mL (has no administration in time range)   dextrose 10% bolus 250 mL 250 mL (has no administration in time range)   lactated ringers infusion ( Intravenous New Bag 4/15/24 0225)   insulin aspart U-100 pen 2 Units (has no administration in time range)   lactated ringers bolus 1,000 mL (0 mLs Intravenous Stopped 4/14/24 2029)   sodium chloride 0.9% bolus 1,000 mL 1,000 mL (0 mLs Intravenous Stopped 4/14/24 2140)     Medical Decision Making  34-year-old man with DM, not on any medication, presenting with polydipsia, polyuria x3 weeks.    Differential diagnosis includes but is not limited to: Hyperglycemia, DKA, HHS, metabolic derangements    Labs obtained in the ED significant for mild acidosis of 7.25, glucose in the 700s, with positive ketones, beta hydroxybutyrate of 4.9.  Decreased bicarb of 16, however no anion gap. Given 2 L IV fluids, and given acidosis, ketones, with decreased bicarb, started on insulin drip for treatment of early DKA versus HHS.  No nausea/vomiting at home, no infectious symptoms, no diabetic foot ulcers.    No abdominal tenderness on exam, with no headache/neck pain, no further indication for emergent CT imaging at this time.      After review of the patient's physical exam, ED testing, and history/symptoms, the patient requires additional care in the hospital overnight. The hospital medicine service will accept the patient  and relevant labs, imaging, or procedures were discussed and they were made aware of any pending labs/imaging/interventions. The diagnosis, treatment and plan were discussed with the patient. All questions and/or concerns have been addressed to the best of my ability.      Amount and/or Complexity of Data Reviewed  Labs:  Decision-making details documented in ED Course.    Risk  Prescription drug management.               ED Course as of 04/15/24 0852   Sun Apr 14, 2024 1954 POC PH(!!): 7.252 [LF]   1959 Mild acidosis of 7.25, likely metabolic, pending CMP, may be early DKA, no respiratory distress no altered mental status [LF]   2101 EKG independently interpreted by me with sinus rhythm rate of 75, diffuse mild ST elevations more prominent in the precordial leads, consistent with early repol, no active chest pain, no reciprocal depressions, no STEMI [LF]   2110 Potassium: 4.3 [LF]      ED Course User Index  [LF] Jud Garcias MD                           Clinical Impression:  Final diagnoses:  [R73.9] Hyperglycemia  [E11.10] Diabetic ketoacidosis without coma associated with type 2 diabetes mellitus (Primary)          ED Disposition Condition    Observation                 Jud Garcias MD  04/15/24 0852

## 2024-04-15 NOTE — CONSULTS
Pt not appropriate for education at this time. Will follow up with pt when moved to floor. Attached education to d/c documents.

## 2024-04-15 NOTE — CARE UPDATE
34 year old male admitted for hyperglycemia with history of DM type 2    Lab obtained at presentation showed point of care glucose greater than 500, urinalysis showed colorless urine, 4+ glucose, 2+ of ketones. Beta hydroxybutyrate 4.9, H&H of 14.2/44.2, BUN/creatinine 17/2.1. Serum bicarbonate of 16. Chest x-ray showed no acute cardiopulmonary process.       Patient seen and examined.  Reviewed H&P,  labs and vital signs  Continue with current medical regimen    Patient education on diabetes, diet, insulin regimen. Hgb A1c pending  Beta-hydroxybutyrate 4.1

## 2024-04-15 NOTE — ED TRIAGE NOTES
Pt has been having a increased thirst & frequent urination for 3 weeks. Pt states took metformin in the past but has not been on it for some time.

## 2024-04-15 NOTE — H&P
[facility] Medicine  History & Physical    Patient Name: Roly Candelaria  MRN: [id number]  Patient Class: OP- Observation  Admission Date: 4/14/2024  Attending Physician: Jose Sandoval MD   Primary Care Provider: Marifer Primary Doctor         Patient information was obtained from patient, parent, past medical records, and ER records.     Subjective:     Principal Problem:Type 2 diabetes mellitus with hyperglycemia    Chief Complaint:   Chief Complaint   Patient presents with    Urinary Frequency     Has been having a dry throat, increased thirst & frequent urination for 3 weeks, no hx DM, CBG >500 in triage        HPI: 34-year-old  male with medical history significant for type 2 diabetes mellitus-diagnosed January of 2023, and class 1 obesity who presented to the emergency department to get checked out for polyuria, polydipsia and fatigue. He was diagnosed with type 2 diabetes mellitus about 15 months ago but decided on lifestyle modification for his treatment, he voiced losing about 15-20 lb, with improved diet and physical activity but symptoms of polyuria, polydipsia and generalized fatigue were noticed about 3 weeks ago, worsening hence he presented to the emergency room today.  He denied symptoms of dysuria, urgency, straining at micturition or hematuria.  He has not had any fever, chills or rigors.  Denied nausea, no change in bowel habits, no vomiting, diarrhea or constipation.  He has not had any cough, no chest pain, shortness for breath, no orthopnea, no paroxysmal nocturnal dyspnea or pedal swelling.  He denied cigarette smoking but occasionally drinks alcohol. He denied heat or cold intolerance, No past surgical history.  Lab obtained at presentation showed point of care glucose greater than 500, urinalysis showed colorless urine, 4+ glucose, 2+ of ketones.  Beta hydroxybutyrate 4.9, H&H of 14.2/44.2, BUN/creatinine 17/2.1.  Serum bicarbonate of 16.  Chest  x-ray showed no acute cardiopulmonary process.    No past medical history on file.    No past surgical history on file.    Review of patient's allergies indicates:  No Known Allergies    Current Facility-Administered Medications   Medication Dose Route Frequency Provider Last Rate Last Admin    acetaminophen tablet 1,000 mg  1,000 mg Oral Q8H Darrian Jonas MD        acetaminophen tablet 650 mg  650 mg Oral Q8H PRN Darrian Deluna MD        dextrose 10 % infusion   Intravenous PRN Jud Garcias MD        dextrose 10 % infusion   Intravenous PRN Jud Garcias MD        dextrose 10% bolus 125 mL 125 mL  12.5 g Intravenous PRN Darrian Deluna MD        dextrose 10% bolus 250 mL 250 mL  25 g Intravenous PRN Darrian Deluna MD        dextrose 5 % and 0.45 % NaCl infusion   Intravenous Continuous PRN Jud Garcias MD        dextrose 50% injection 12.5 g  12.5 g Intravenous PRN Jud Garcias MD        dextrose 50% injection 25 g  25 g Intravenous PRN Jud Garcias MD        glucagon (human recombinant) injection 1 mg  1 mg Intramuscular PRN Darrian Deluna MD        glucose chewable tablet 16 g  16 g Oral Darrian Jonas MD        glucose chewable tablet 24 g  24 g Oral Darrian Jonas MD        insulin aspart U-100 pen 0-10 Units  0-10 Units Subcutaneous QID (AC + HS) Darrian Jonas MD        insulin aspart U-100 pen 2 Units  2 Units Subcutaneous TIDWM Darrian Deluna MD        insulin detemir U-100 (Levemir) pen 5 Units  5 Units Subcutaneous BID Darrian Deluna MD        lactated ringers infusion   Intravenous Continuous Darrian Deluna MD        magnesium oxide tablet 800 mg  800 mg Oral MIRLANDEN Darrian Deluna MD        magnesium oxide tablet 800 mg  800 mg Oral PRN Darrian Deluna MD        melatonin tablet 6 mg  6 mg Oral Nightly PRN Darrian Deluna MD        naloxone 0.4 mg/mL injection 0.02 mg  0.02 mg Intravenous PRN Darrian Deluna MD         ondansetron disintegrating tablet 8 mg  8 mg Oral Q8H PRN Darrian Deluna MD        ondansetron injection 4 mg  4 mg Intravenous Q8H PRN Darrian Deluna MD        polyethylene glycol packet 17 g  17 g Oral Daily Darrian Deluna MD        potassium bicarbonate disintegrating tablet 35 mEq  35 mEq Oral PRN Darrian Deluna MD        potassium bicarbonate disintegrating tablet 50 mEq  50 mEq Oral PRN Darrian Deluna MD        potassium bicarbonate disintegrating tablet 60 mEq  60 mEq Oral PRN Darrian Deluna MD        potassium, sodium phosphates 280-160-250 mg packet 2 packet  2 packet Oral PRN Darrian Deluna MD        potassium, sodium phosphates 280-160-250 mg packet 2 packet  2 packet Oral PRN Darrian Deluna MD        potassium, sodium phosphates 280-160-250 mg packet 2 packet  2 packet Oral PRN Darrian Deluna MD        simethicone chewable tablet 80 mg  1 tablet Oral QID PRN Darrian Deluna MD        sodium chloride 0.9% flush 10 mL  10 mL Intravenous PRN Jud Garcias MD        sodium chloride 0.9% flush 10 mL  10 mL Intravenous PRN Darrian Deluna MD         Current Outpatient Medications   Medication Sig Dispense Refill    cetirizine (ZYRTEC) 10 MG tablet Take 1 tablet (10 mg total) by mouth once daily. 30 tablet 0    diphenhydrAMINE-aluminum-magnesium hydroxide-simethicone-LIDOcaine HCl 2% Swish and spit 15 mLs every 4 (four) hours as needed (sore throat). 120 each 0    fluticasone propionate (FLONASE) 50 mcg/actuation nasal spray 1 spray (50 mcg total) by Each Nostril route once daily. (Patient not taking: Reported on 2/24/2023) 16 g 0    ibuprofen (ADVIL,MOTRIN) 600 MG tablet Take 1 tablet (600 mg total) by mouth every 6 (six) hours as needed for Pain. (Patient not taking: Reported on 2/24/2023) 20 tablet 0    metFORMIN (GLUCOPHAGE) 500 MG tablet Take 1 tablet (500 mg total) by mouth 2 (two) times daily with meals. 60 tablet 11    olopatadine (PATADAY) 0.2 % Drop Place 1  drop into both eyes once daily. 2 mL 0     Family History    None       Tobacco Use    Smoking status: Never    Smokeless tobacco: Never   Substance and Sexual Activity    Alcohol use: Yes     Comment: occasionally    Drug use: No    Sexual activity: Not Currently     Review of Systems   Constitutional:  Negative for appetite change, chills, diaphoresis, fatigue and fever.   HENT:  Negative for congestion, sore throat and tinnitus.    Eyes:  Negative for pain, discharge and itching.   Respiratory:  Negative for cough, chest tightness, shortness of breath and wheezing.    Cardiovascular:  Negative for chest pain, palpitations and leg swelling.   Gastrointestinal:  Negative for abdominal distention, abdominal pain, blood in stool, nausea and vomiting.   Endocrine: Positive for polydipsia and polyuria. Negative for cold intolerance and heat intolerance.   Genitourinary:  Negative for difficulty urinating, dysuria, flank pain, frequency, hematuria and urgency.   Musculoskeletal:  Negative for arthralgias and back pain.   Skin:  Negative for color change, pallor, rash and wound.   Neurological:  Negative for dizziness, seizures, facial asymmetry, light-headedness, numbness and headaches.   Psychiatric/Behavioral:  Negative for agitation, confusion and hallucinations.      Objective:     Vital Signs (Most Recent):  Temp: 98 °F (36.7 °C) (04/14/24 2214)  Pulse: 85 (04/14/24 2214)  Resp: 18 (04/14/24 2214)  BP: 117/84 (04/14/24 2214)  SpO2: 99 % (04/14/24 2214) Vital Signs (24h Range):  Temp:  [98 °F (36.7 °C)-98.4 °F (36.9 °C)] 98 °F (36.7 °C)  Pulse:  [80-85] 85  Resp:  [18-22] 18  SpO2:  [99 %-100 %] 99 %  BP: (117-127)/(78-84) 117/84     Weight: 99.8 kg (220 lb)  Body mass index is 33.45 kg/m².     Physical Exam  Vitals reviewed.   Constitutional:       General: He is not in acute distress.     Appearance: He is obese. He is not ill-appearing, toxic-appearing or diaphoretic.   HENT:      Head: Normocephalic and  atraumatic.      Nose: Nose normal. No congestion or rhinorrhea.      Mouth/Throat:      Mouth: Mucous membranes are dry.   Eyes:      General: No scleral icterus.     Extraocular Movements: Extraocular movements intact.      Conjunctiva/sclera: Conjunctivae normal.      Pupils: Pupils are equal, round, and reactive to light.   Cardiovascular:      Rate and Rhythm: Normal rate and regular rhythm.      Pulses: Normal pulses.      Heart sounds: Normal heart sounds. No murmur heard.  Pulmonary:      Effort: Pulmonary effort is normal. No respiratory distress.      Breath sounds: Normal breath sounds. No stridor. No wheezing, rhonchi or rales.   Chest:      Chest wall: No tenderness.   Abdominal:      General: Abdomen is flat. Bowel sounds are normal. There is no distension.      Palpations: Abdomen is soft.      Tenderness: There is no abdominal tenderness. There is no right CVA tenderness, left CVA tenderness, guarding or rebound.   Musculoskeletal:         General: No swelling or tenderness. Normal range of motion.      Cervical back: Normal range of motion and neck supple. No rigidity or tenderness.      Right lower leg: No edema.      Left lower leg: No edema.   Skin:     General: Skin is warm and dry.      Coloration: Skin is not jaundiced or pale.      Findings: No bruising, erythema, lesion or rash.   Neurological:      General: No focal deficit present.      Mental Status: He is alert and oriented to person, place, and time. Mental status is at baseline.      Cranial Nerves: No cranial nerve deficit.      Sensory: No sensory deficit.   Psychiatric:         Mood and Affect: Mood normal.         Behavior: Behavior normal.         Thought Content: Thought content normal.         Judgment: Judgment normal.              CRANIAL NERVES     CN III, IV, VI   Pupils are equal, round, and reactive to light.       Significant Labs: All pertinent labs within the past 24 hours have been reviewed.  ABGs:   Recent Labs   Lab  04/14/24  1951 04/15/24  0012   PH 7.252* 7.319*   PCO2 39.0 38.2   HCO3 17.2* 19.6*   POCSATURATED 40 57   BE -9* -6*   PO2 26* 32*     Bilirubin:   Recent Labs   Lab 04/14/24 1946   BILITOT 0.4     CBC:   Recent Labs   Lab 04/14/24  1946 04/15/24  0012   WBC 6.03  --    HGB 14.2  --    HCT 44.2 43     --      CMP:   Recent Labs   Lab 04/14/24 1946   *   K 4.3   CL 97   CO2 16*   *   BUN 17   CREATININE 2.1*   CALCIUM 9.6   PROT 8.0   ALBUMIN 4.2   BILITOT 0.4   ALKPHOS 168*   AST 13   ALT 16   ANIONGAP 14     Magnesium:   Recent Labs   Lab 04/14/24 1946   MG 1.8     Urine Studies:   Recent Labs   Lab 04/14/24 2010   COLORU Colorless*   APPEARANCEUA Clear   PHUR 5.0   SPECGRAV 1.025   PROTEINUA Negative   GLUCUA 4+*   KETONESU 2+*   BILIRUBINUA Negative   OCCULTUA Negative   NITRITE Negative   UROBILINOGEN Negative   LEUKOCYTESUR Negative   BACTERIA Occasional       Significant Imaging: I have reviewed all pertinent imaging results/findings within the past 24 hours.  Imaging Results              X-Ray Chest AP Portable (Final result)  Result time 04/14/24 19:15:47      Final result by Babar Gonzalez MD (04/14/24 19:15:47)                   Impression:      No acute process.      Electronically signed by: Babar Gonzalez MD  Date:    04/14/2024  Time:    19:15               Narrative:    EXAMINATION:  XR CHEST AP PORTABLE    CLINICAL HISTORY:  hyperglycemia;    TECHNIQUE:  Single frontal view of the chest was performed.    COMPARISON:  02/12/2022.    FINDINGS:  The trachea is unremarkable.  The cardiomediastinal silhouette is within normal limits.  The hilar structures are unremarkable.  There is no evidence of free air beneath hemidiaphragms.  There are no pleural effusions.  There is no evidence of a pneumothorax.  There is no evidence of pneumomediastinum.  No airspace opacity is present.  The osseous structures are unremarkable.                                      Assessment/Plan:     * Type  2 diabetes mellitus with hyperglycemia  Patient's FSGs are uncontrolled due to hyperglycemia on current medication regimen.  Last A1c reviewed- we will obtain hemoglobin A1c  He was diagnosed with type 2 diabetes mellitus 1 year ago  He tried lifestyle modification but returned to the emergency room with polyuria, polydipsia, fatigue with point of care glucose greater than 500  Glucose on cmp was 767 mg/dL  Anion gap was normal at presentation although he was acidotic  Serum bicarbonate was low at 16, with 2+ urine ketones and elevated beta hydroxybutyrate.  Encouraged to continue lifestyle modification   Will start on insulin, to try oral hypoglycemic agents at discharge, coupled with insulin, dependent on obtained hemoglobin A1c.  Most recent fingerstick glucose reviewed-   Recent Labs   Lab 04/14/24  1847 04/14/24  2152 04/14/24  2327   POCTGLUCOSE >500* >500* 280*     Current correctional scale  Medium  Maintain anti-hyperglycemic dose as follows-   Antihyperglycemics (From admission, onward)      Start     Stop Route Frequency Ordered    04/15/24 0900  insulin detemir U-100 (Levemir) pen 5 Units         -- SubQ 2 times daily 04/15/24 0027    04/15/24 0715  insulin aspart U-100 pen 2 Units         -- SubQ 3 times daily with meals 04/15/24 0030    04/15/24 0121  insulin aspart U-100 pen 0-10 Units         -- SubQ Before meals & nightly PRN 04/15/24 0027          Hold Oral hypoglycemics while patient is in the hospital.    ANA (acute kidney injury)  Patient with acute kidney injury/acute renal failure likely due to pre-renal azotemia due to IVVD in the setting of osmotic diuresis.  ANA is currently stable. Baseline creatinine  1 years we will every day was pain  - Labs reviewed- Renal function/electrolytes with Estimated Creatinine Clearance: 55.7 mL/min (A) (based on SCr of 2.1 mg/dL (H)). according to latest data.   Monitor urine output and serial BMP and adjust therapy as needed.   Avoid nephrotoxins and  renally dose meds for GFR listed above.    Hyponatremia  Patient has hyponatremia which is controlled,We will aim to correct the sodium by 4-6mEq in 24 hours. We will monitor sodium Every 12 hours. The hyponatremia is due to Dehydration/hypovolemia and hyperglycemia. We will obtain the following studies: repeat BMP with hyperglycemia is correction. We will treat the hyponatremia with IV fluids as follows: 100 ml/hr LR. The patient's sodium results have been reviewed and are listed below.  Recent Labs   Lab 04/14/24 1946   *       Obesity  Body mass index is 33.45 kg/m².   Morbid obesity complicates all aspects of disease management from diagnostic modalities to treatment.   Weight loss encouraged and health benefits explained to patient.           VTE Risk Mitigation (From admission, onward)           Ordered     IP VTE LOW RISK PATIENT  Once         04/14/24 2112                       On 04/15/2024, patient should be placed in hospital observation services under my care.        AdmissionCare    Guideline: Diabetes - OBS, Observation    Based on the indications selected for the patient, the bed status of Observation was determined to be MET    The following indications were selected as present at the time of evaluation of the patient:      - Diabetic ketoacidosis appropriate for observation care, as indicated by ALL of the following:    - Hyperglycemia (eg, plasma glucose greater than 200 mg/dL (11.10 mmol/L))    - Ketonuria or ketonemia (eg, serum beta hydroxybutyrate greater than 3 mmol/L, or moderate to large ketonuria)    - Acidosis (eg, arterial or venous pH less than 7.30, or serum bicarbonate less than 15 mEq/L (mmol/L))    - Observation care management appropriate, as indicated by ALL of the following:     - Hypotension absent, as indicated by 1 or more of the following:      - SBP greater than or equal to 90 mm Hg in adult or child 10 years or older     - Arterial or venous pH not less than 7.0 (7.20  in children)     - Serum bicarbonate not less than 10 mEq/L (mmol/L)     - Mental status at baseline     - No severe dehydration     - Patient not pregnant     - Etiology of DKA presumed to be missed insulin doses (eg, not infection)     - Patient has known established outpatient insulin regimen (eg, not newly diagnosed diabetes).    AdmissionCare documentation entered by: Maribel Tong    [facility], 27th edition, Copyright © 2023 [facility] [employer] All Rights Reserved.  [id number]:[id number]:00    Darrian Deluna MD  Department of Hospital Medicine  [facility] - Emergency Dept

## 2024-04-15 NOTE — ASSESSMENT & PLAN NOTE
Patient's FSGs are uncontrolled due to hyperglycemia on current medication regimen.  Last A1c reviewed- we will obtain hemoglobin A1c  He was diagnosed with type 2 diabetes mellitus 1 year ago  He tried lifestyle modification but returned to the emergency room with polyuria, polydipsia, fatigue with point of care glucose greater than 500  Glucose on cmp was 767 mg/dL  Anion gap was normal at presentation although he was acidotic  Serum bicarbonate was low at 16, with 2+ urine ketones and elevated beta hydroxybutyrate.  Encouraged to continue lifestyle modification   Will start on insulin, to try oral hypoglycemic agents at discharge, coupled with insulin, dependent on obtained hemoglobin A1c.  Most recent fingerstick glucose reviewed-   Recent Labs   Lab 04/14/24  1847 04/14/24  2152 04/14/24  2327   POCTGLUCOSE >500* >500* 280*     Current correctional scale  Medium  Maintain anti-hyperglycemic dose as follows-   Antihyperglycemics (From admission, onward)      Start     Stop Route Frequency Ordered    04/15/24 0900  insulin detemir U-100 (Levemir) pen 5 Units         -- SubQ 2 times daily 04/15/24 0027    04/15/24 0715  insulin aspart U-100 pen 2 Units         -- SubQ 3 times daily with meals 04/15/24 0030    04/15/24 0121  insulin aspart U-100 pen 0-10 Units         -- SubQ Before meals & nightly PRN 04/15/24 0027          Hold Oral hypoglycemics while patient is in the hospital.

## 2024-04-15 NOTE — ASSESSMENT & PLAN NOTE
Patient has hyponatremia which is controlled,We will aim to correct the sodium by 4-6mEq in 24 hours. We will monitor sodium Every 12 hours. The hyponatremia is due to Dehydration/hypovolemia and hyperglycemia. We will obtain the following studies: repeat BMP with hyperglycemia is correction. We will treat the hyponatremia with IV fluids as follows: 100 ml/hr LR. The patient's sodium results have been reviewed and are listed below.  Recent Labs   Lab 04/14/24 1946   *

## 2024-04-16 LAB
ANION GAP SERPL CALC-SCNC: 12 MMOL/L (ref 8–16)
B-OH-BUTYR BLD STRIP-SCNC: 4.4 MMOL/L (ref 0–0.5)
BASOPHILS # BLD AUTO: 0.04 K/UL (ref 0–0.2)
BASOPHILS NFR BLD: 0.7 % (ref 0–1.9)
BUN SERPL-MCNC: 9 MG/DL (ref 6–20)
CALCIUM SERPL-MCNC: 8.9 MG/DL (ref 8.7–10.5)
CHLORIDE SERPL-SCNC: 105 MMOL/L (ref 95–110)
CO2 SERPL-SCNC: 19 MMOL/L (ref 23–29)
CREAT SERPL-MCNC: 1.1 MG/DL (ref 0.5–1.4)
DIFFERENTIAL METHOD BLD: ABNORMAL
EOSINOPHIL # BLD AUTO: 0.1 K/UL (ref 0–0.5)
EOSINOPHIL NFR BLD: 1.8 % (ref 0–8)
ERYTHROCYTE [DISTWIDTH] IN BLOOD BY AUTOMATED COUNT: 13.6 % (ref 11.5–14.5)
ERYTHROCYTE [DISTWIDTH] IN BLOOD BY AUTOMATED COUNT: 13.6 % (ref 11.5–14.5)
EST. GFR  (NO RACE VARIABLE): >60 ML/MIN/1.73 M^2
GLUCOSE SERPL-MCNC: 279 MG/DL (ref 70–110)
HCT VFR BLD AUTO: 40.2 % (ref 40–54)
HCT VFR BLD AUTO: 40.2 % (ref 40–54)
HGB BLD-MCNC: 13 G/DL (ref 14–18)
HGB BLD-MCNC: 13 G/DL (ref 14–18)
IMM GRANULOCYTES # BLD AUTO: 0.01 K/UL (ref 0–0.04)
IMM GRANULOCYTES NFR BLD AUTO: 0.2 % (ref 0–0.5)
LYMPHOCYTES # BLD AUTO: 2.9 K/UL (ref 1–4.8)
LYMPHOCYTES NFR BLD: 46.6 % (ref 18–48)
MAGNESIUM SERPL-MCNC: 1.6 MG/DL (ref 1.6–2.6)
MCH RBC QN AUTO: 27.4 PG (ref 27–31)
MCH RBC QN AUTO: 27.4 PG (ref 27–31)
MCHC RBC AUTO-ENTMCNC: 32.3 G/DL (ref 32–36)
MCHC RBC AUTO-ENTMCNC: 32.3 G/DL (ref 32–36)
MCV RBC AUTO: 85 FL (ref 82–98)
MCV RBC AUTO: 85 FL (ref 82–98)
MONOCYTES # BLD AUTO: 0.3 K/UL (ref 0.3–1)
MONOCYTES NFR BLD: 5.4 % (ref 4–15)
NEUTROPHILS # BLD AUTO: 2.8 K/UL (ref 1.8–7.7)
NEUTROPHILS NFR BLD: 45.3 % (ref 38–73)
NRBC BLD-RTO: 0 /100 WBC
PHOSPHATE SERPL-MCNC: 3.6 MG/DL (ref 2.7–4.5)
PLATELET # BLD AUTO: 250 K/UL (ref 150–450)
PLATELET # BLD AUTO: 250 K/UL (ref 150–450)
PMV BLD AUTO: 11.5 FL (ref 9.2–12.9)
PMV BLD AUTO: 11.5 FL (ref 9.2–12.9)
POCT GLUCOSE: 255 MG/DL (ref 70–110)
POCT GLUCOSE: 268 MG/DL (ref 70–110)
POCT GLUCOSE: 281 MG/DL (ref 70–110)
POCT GLUCOSE: 295 MG/DL (ref 70–110)
POTASSIUM SERPL-SCNC: 3.4 MMOL/L (ref 3.5–5.1)
RBC # BLD AUTO: 4.75 M/UL (ref 4.6–6.2)
RBC # BLD AUTO: 4.75 M/UL (ref 4.6–6.2)
SODIUM SERPL-SCNC: 136 MMOL/L (ref 136–145)
WBC # BLD AUTO: 6.11 K/UL (ref 3.9–12.7)
WBC # BLD AUTO: 6.11 K/UL (ref 3.9–12.7)

## 2024-04-16 PROCEDURE — 25000003 PHARM REV CODE 250: Performed by: NURSE PRACTITIONER

## 2024-04-16 PROCEDURE — 85025 COMPLETE CBC W/AUTO DIFF WBC: CPT | Performed by: STUDENT IN AN ORGANIZED HEALTH CARE EDUCATION/TRAINING PROGRAM

## 2024-04-16 PROCEDURE — 96372 THER/PROPH/DIAG INJ SC/IM: CPT | Performed by: NURSE PRACTITIONER

## 2024-04-16 PROCEDURE — 80048 BASIC METABOLIC PNL TOTAL CA: CPT | Performed by: STUDENT IN AN ORGANIZED HEALTH CARE EDUCATION/TRAINING PROGRAM

## 2024-04-16 PROCEDURE — 83735 ASSAY OF MAGNESIUM: CPT | Performed by: STUDENT IN AN ORGANIZED HEALTH CARE EDUCATION/TRAINING PROGRAM

## 2024-04-16 PROCEDURE — 36415 COLL VENOUS BLD VENIPUNCTURE: CPT | Performed by: STUDENT IN AN ORGANIZED HEALTH CARE EDUCATION/TRAINING PROGRAM

## 2024-04-16 PROCEDURE — 84100 ASSAY OF PHOSPHORUS: CPT | Performed by: STUDENT IN AN ORGANIZED HEALTH CARE EDUCATION/TRAINING PROGRAM

## 2024-04-16 PROCEDURE — 21400001 HC TELEMETRY ROOM

## 2024-04-16 PROCEDURE — 82010 KETONE BODYS QUAN: CPT | Performed by: NURSE PRACTITIONER

## 2024-04-16 PROCEDURE — 63600175 PHARM REV CODE 636 W HCPCS: Performed by: NURSE PRACTITIONER

## 2024-04-16 RX ORDER — SODIUM CHLORIDE, SODIUM LACTATE, POTASSIUM CHLORIDE, CALCIUM CHLORIDE 600; 310; 30; 20 MG/100ML; MG/100ML; MG/100ML; MG/100ML
INJECTION, SOLUTION INTRAVENOUS CONTINUOUS
Status: DISCONTINUED | OUTPATIENT
Start: 2024-04-16 | End: 2024-04-19 | Stop reason: HOSPADM

## 2024-04-16 RX ORDER — SODIUM CHLORIDE, SODIUM LACTATE, POTASSIUM CHLORIDE, CALCIUM CHLORIDE 600; 310; 30; 20 MG/100ML; MG/100ML; MG/100ML; MG/100ML
INJECTION, SOLUTION INTRAVENOUS CONTINUOUS
Status: DISCONTINUED | OUTPATIENT
Start: 2024-04-16 | End: 2024-04-16

## 2024-04-16 RX ORDER — INSULIN ASPART 100 [IU]/ML
4 INJECTION, SOLUTION INTRAVENOUS; SUBCUTANEOUS
Status: DISCONTINUED | OUTPATIENT
Start: 2024-04-16 | End: 2024-04-18

## 2024-04-16 RX ORDER — INSULIN ASPART 100 [IU]/ML
4 INJECTION, SOLUTION INTRAVENOUS; SUBCUTANEOUS
Status: DISCONTINUED | OUTPATIENT
Start: 2024-04-16 | End: 2024-04-16

## 2024-04-16 RX ORDER — POTASSIUM CHLORIDE 20 MEQ/1
40 TABLET, EXTENDED RELEASE ORAL ONCE
Status: COMPLETED | OUTPATIENT
Start: 2024-04-16 | End: 2024-04-16

## 2024-04-16 RX ORDER — LANOLIN ALCOHOL/MO/W.PET/CERES
800 CREAM (GRAM) TOPICAL EVERY 4 HOURS
Status: COMPLETED | OUTPATIENT
Start: 2024-04-16 | End: 2024-04-16

## 2024-04-16 RX ADMIN — INSULIN ASPART 3 UNITS: 100 INJECTION, SOLUTION INTRAVENOUS; SUBCUTANEOUS at 08:04

## 2024-04-16 RX ADMIN — POTASSIUM CHLORIDE 40 MEQ: 1500 TABLET, EXTENDED RELEASE ORAL at 10:04

## 2024-04-16 RX ADMIN — INSULIN ASPART 4 UNITS: 100 INJECTION, SOLUTION INTRAVENOUS; SUBCUTANEOUS at 12:04

## 2024-04-16 RX ADMIN — SODIUM CHLORIDE, POTASSIUM CHLORIDE, SODIUM LACTATE AND CALCIUM CHLORIDE: 600; 310; 30; 20 INJECTION, SOLUTION INTRAVENOUS at 10:04

## 2024-04-16 RX ADMIN — Medication 800 MG: at 05:04

## 2024-04-16 RX ADMIN — Medication 800 MG: at 02:04

## 2024-04-16 RX ADMIN — INSULIN DETEMIR 5 UNITS: 100 INJECTION, SOLUTION SUBCUTANEOUS at 09:04

## 2024-04-16 RX ADMIN — INSULIN ASPART 4 UNITS: 100 INJECTION, SOLUTION INTRAVENOUS; SUBCUTANEOUS at 05:04

## 2024-04-16 RX ADMIN — INSULIN ASPART 2 UNITS: 100 INJECTION, SOLUTION INTRAVENOUS; SUBCUTANEOUS at 08:04

## 2024-04-16 RX ADMIN — INSULIN ASPART 6 UNITS: 100 INJECTION, SOLUTION INTRAVENOUS; SUBCUTANEOUS at 12:04

## 2024-04-16 RX ADMIN — INSULIN ASPART 6 UNITS: 100 INJECTION, SOLUTION INTRAVENOUS; SUBCUTANEOUS at 09:04

## 2024-04-16 RX ADMIN — INSULIN ASPART 6 UNITS: 100 INJECTION, SOLUTION INTRAVENOUS; SUBCUTANEOUS at 05:04

## 2024-04-16 RX ADMIN — INSULIN DETEMIR 7 UNITS: 100 INJECTION, SOLUTION SUBCUTANEOUS at 12:04

## 2024-04-16 RX ADMIN — Medication 800 MG: at 09:04

## 2024-04-16 NOTE — NURSING
0715 Report received from JULI Matthew. Patient awake and alert. Safety measures in place. Call light sallie blum.

## 2024-04-16 NOTE — CONSULTS
Thank you for your consult to [facility]. We have reviewed the patient chart. This patient does meet criteria for [facility] service at this time.  Will assume care on 04/17/24 at 6AM.

## 2024-04-16 NOTE — HOSPITAL COURSE
Admission to observation for uncontrolled diabetes/mild DKA. HgbA1c >14. Continue to adjust basal and meal time insulin.

## 2024-04-16 NOTE — NURSING
[facility] [place]  Nurses Note -- 4 Eyes        Skin assessed on: Q Shift      [x] No Pressure Injuries Present    [x]Prevention Measures Documented    [] Yes LDA  for Pressure Injury Previously documented     [] Yes New Pressure Injury Discovered   [] LDA for New Pressure Injury Added      Attending RN:  Dustin Hudson RN     Second RN:  Dl CARCAMO RN

## 2024-04-16 NOTE — NURSING
Chest tube removed yesterday  F/U CXR today looks fine  reconsult as needed [facility]  Nurses Note -- 4 Eyes      4/16/2024       Skin assessed on: Q Shift      [x] No Pressure Injuries Present    []Prevention Measures Documented    [] Yes LDA  for Pressure Injury Previously documented     [] Yes New Pressure Injury Discovered   [] LDA for New Pressure Injury Added      Attending RN:  Dl Linares RN     Second RN: JULI Blevins

## 2024-04-16 NOTE — ASSESSMENT & PLAN NOTE
Patient has hyponatremia which is controlled,We will aim to correct the sodium by 4-6mEq in 24 hours. We will monitor sodium Every 12 hours. The hyponatremia is due to Dehydration/hypovolemia and hyperglycemia. We will obtain the following studies: repeat BMP with hyperglycemia is correction. We will treat the hyponatremia with IV fluids as follows: 100 ml/hr LR. The patient's sodium results have been reviewed and are listed below.  Recent Labs   Lab 04/16/24  0326        resolved

## 2024-04-16 NOTE — SUBJECTIVE & OBJECTIVE
Interval History: no cp or sob. Polyuria resolved .   Review of Systems   Constitutional:  Negative for appetite change, chills, diaphoresis, fatigue and fever.   Eyes:  Negative for pain, discharge and itching.   Respiratory:  Negative for cough, chest tightness, shortness of breath and wheezing.    Cardiovascular:  Negative for chest pain, palpitations and leg swelling.   Gastrointestinal:  Negative for abdominal distention, abdominal pain, blood in stool, nausea and vomiting.   Endocrine: Positive for polydipsia (resolved) and polyuria (resolved). Negative for cold intolerance and heat intolerance.   Genitourinary:  Negative for difficulty urinating, dysuria, flank pain, frequency, hematuria and urgency.   Musculoskeletal:  Negative for arthralgias and back pain.   Skin:  Negative for color change, pallor, rash and wound.   Neurological:  Negative for dizziness, seizures, facial asymmetry, light-headedness, numbness and headaches.     Objective:     Vital Signs (Most Recent):  Temp: 98.6 °F (37 °C) (04/16/24 1118)  Pulse: 78 (04/16/24 1118)  Resp: 19 (04/16/24 1118)  BP: 118/63 (04/16/24 1118)  SpO2: 96 % (04/16/24 1118) Vital Signs (24h Range):  Temp:  [97.5 °F (36.4 °C)-98.6 °F (37 °C)] 98.6 °F (37 °C)  Pulse:  [69-90] 78  Resp:  [18-20] 19  SpO2:  [95 %-99 %] 96 %  BP: (109-121)/(62-76) 118/63     Weight: 97.3 kg (214 lb 8.1 oz)  Body mass index is 32.62 kg/m².    Intake/Output Summary (Last 24 hours) at 4/16/2024 1230  Last data filed at 4/16/2024 0914  Gross per 24 hour   Intake 240 ml   Output --   Net 240 ml         Physical Exam  Vitals reviewed.   Constitutional:       General: He is not in acute distress.     Appearance: He is obese. He is not ill-appearing, toxic-appearing or diaphoretic.   HENT:      Head: Normocephalic and atraumatic.      Mouth/Throat:      Mouth: Mucous membranes are dry.   Eyes:      General: No scleral icterus.     Extraocular Movements: Extraocular movements intact.       Conjunctiva/sclera: Conjunctivae normal.      Pupils: Pupils are equal, round, and reactive to light.   Cardiovascular:      Rate and Rhythm: Normal rate and regular rhythm.      Pulses: Normal pulses.      Heart sounds: Normal heart sounds. No murmur heard.  Pulmonary:      Effort: Pulmonary effort is normal. No respiratory distress.      Breath sounds: Normal breath sounds. No stridor. No wheezing, rhonchi or rales.   Chest:      Chest wall: No tenderness.   Abdominal:      General: Abdomen is flat. Bowel sounds are normal. There is no distension.      Palpations: Abdomen is soft.      Tenderness: There is no abdominal tenderness. There is no right CVA tenderness, left CVA tenderness, guarding or rebound.   Musculoskeletal:         General: No swelling or tenderness. Normal range of motion.      Cervical back: Normal range of motion and neck supple. No rigidity or tenderness.      Right lower leg: No edema.      Left lower leg: No edema.   Lymphadenopathy:      Cervical: Cervical adenopathy present.   Skin:     General: Skin is warm and dry.      Coloration: Skin is not jaundiced or pale.      Findings: No bruising, erythema, lesion or rash.   Neurological:      General: No focal deficit present.      Mental Status: He is alert and oriented to person, place, and time. Mental status is at baseline.      Cranial Nerves: No cranial nerve deficit.      Sensory: No sensory deficit.             Significant Labs: All pertinent labs within the past 24 hours have been reviewed.    Significant Imaging: I have reviewed all pertinent imaging results/findings within the past 24 hours.

## 2024-04-16 NOTE — PROGRESS NOTES
[facility] Medicine  Progress Note    Patient Name: Roly Candelaria  MRN: [id number]  Patient Class: OP- Observation   Admission Date: 4/14/2024  Length of Stay: 0 days  Attending Physician: Troy Jauregui MD  Primary Care Provider: Marifer, Primary Doctor        Subjective:     Principal Problem:Type 2 diabetes mellitus with hyperglycemia        HPI:  34-year-old  male with medical history significant for type 2 diabetes mellitus-diagnosed January of 2023, and class 1 obesity who presented to the emergency department to get checked out for polyuria, polydipsia and fatigue. He was diagnosed with type 2 diabetes mellitus about 15 months ago but decided on lifestyle modification for his treatment, he voiced losing about 15-20 lb, with improved diet and physical activity but symptoms of polyuria, polydipsia and generalized fatigue were noticed about 3 weeks ago, worsening hence he presented to the emergency room today.  He denied symptoms of dysuria, urgency, straining at micturition or hematuria.  He has not had any fever, chills or rigors.  Denied nausea, no change in bowel habits, no vomiting, diarrhea or constipation.  He has not had any cough, no chest pain, shortness for breath, no orthopnea, no paroxysmal nocturnal dyspnea or pedal swelling.  He denied cigarette smoking but occasionally drinks alcohol. He denied heat or cold intolerance, No past surgical history.  Lab obtained at presentation showed point of care glucose greater than 500, urinalysis showed colorless urine, 4+ glucose, 2+ of ketones.  Beta hydroxybutyrate 4.9, H&H of 14.2/44.2, BUN/creatinine 17/2.1.  Serum bicarbonate of 16.  Chest x-ray showed no acute cardiopulmonary process.    Overview/Hospital Course:  Admission to observation for uncontrolled diabetes/mild DKA. HgbA1c >14. Continue to adjust basal and meal time insulin.     Interval History: no cp or sob. Polyuria resolved .   Review of Systems    Constitutional:  Negative for appetite change, chills, diaphoresis, fatigue and fever.   Eyes:  Negative for pain, discharge and itching.   Respiratory:  Negative for cough, chest tightness, shortness of breath and wheezing.    Cardiovascular:  Negative for chest pain, palpitations and leg swelling.   Gastrointestinal:  Negative for abdominal distention, abdominal pain, blood in stool, nausea and vomiting.   Endocrine: Positive for polydipsia (resolved) and polyuria (resolved). Negative for cold intolerance and heat intolerance.   Genitourinary:  Negative for difficulty urinating, dysuria, flank pain, frequency, hematuria and urgency.   Musculoskeletal:  Negative for arthralgias and back pain.   Skin:  Negative for color change, pallor, rash and wound.   Neurological:  Negative for dizziness, seizures, facial asymmetry, light-headedness, numbness and headaches.     Objective:     Vital Signs (Most Recent):  Temp: 98.6 °F (37 °C) (04/16/24 1118)  Pulse: 78 (04/16/24 1118)  Resp: 19 (04/16/24 1118)  BP: 118/63 (04/16/24 1118)  SpO2: 96 % (04/16/24 1118) Vital Signs (24h Range):  Temp:  [97.5 °F (36.4 °C)-98.6 °F (37 °C)] 98.6 °F (37 °C)  Pulse:  [69-90] 78  Resp:  [18-20] 19  SpO2:  [95 %-99 %] 96 %  BP: (109-121)/(62-76) 118/63     Weight: 97.3 kg (214 lb 8.1 oz)  Body mass index is 32.62 kg/m².    Intake/Output Summary (Last 24 hours) at 4/16/2024 1230  Last data filed at 4/16/2024 0914  Gross per 24 hour   Intake 240 ml   Output --   Net 240 ml         Physical Exam  Vitals reviewed.   Constitutional:       General: He is not in acute distress.     Appearance: He is obese. He is not ill-appearing, toxic-appearing or diaphoretic.   HENT:      Head: Normocephalic and atraumatic.      Mouth/Throat:      Mouth: Mucous membranes are dry.   Eyes:      General: No scleral icterus.     Extraocular Movements: Extraocular movements intact.      Conjunctiva/sclera: Conjunctivae normal.      Pupils: Pupils are equal, round,  and reactive to light.   Cardiovascular:      Rate and Rhythm: Normal rate and regular rhythm.      Pulses: Normal pulses.      Heart sounds: Normal heart sounds. No murmur heard.  Pulmonary:      Effort: Pulmonary effort is normal. No respiratory distress.      Breath sounds: Normal breath sounds. No stridor. No wheezing, rhonchi or rales.   Chest:      Chest wall: No tenderness.   Abdominal:      General: Abdomen is flat. Bowel sounds are normal. There is no distension.      Palpations: Abdomen is soft.      Tenderness: There is no abdominal tenderness. There is no right CVA tenderness, left CVA tenderness, guarding or rebound.   Musculoskeletal:         General: No swelling or tenderness. Normal range of motion.      Cervical back: Normal range of motion and neck supple. No rigidity or tenderness.      Right lower leg: No edema.      Left lower leg: No edema.   Lymphadenopathy:      Cervical: Cervical adenopathy present.   Skin:     General: Skin is warm and dry.      Coloration: Skin is not jaundiced or pale.      Findings: No bruising, erythema, lesion or rash.   Neurological:      General: No focal deficit present.      Mental Status: He is alert and oriented to person, place, and time. Mental status is at baseline.      Cranial Nerves: No cranial nerve deficit.      Sensory: No sensory deficit.             Significant Labs: All pertinent labs within the past 24 hours have been reviewed.    Significant Imaging: I have reviewed all pertinent imaging results/findings within the past 24 hours.    Assessment/Plan:      * Type 2 diabetes mellitus with hyperglycemia  -Patient's FSGs are uncontrolled due to hyperglycemia on current medication regimen.  -mild DKA  -He was diagnosed with type 2 diabetes mellitus 1 year ago  -He tried lifestyle modification but returned to the emergency room with polyuria, polydipsia, fatigue with point of care glucose greater than 500  -Glucose on cmp was 767 mg/dL  -Anion gap was  normal at presentation although he was acidotic, AG >12  -Serum bicarbonate was low at 16, with 2+ urine ketones and elevated beta hydroxybutyrate.  -HgbA1c >14  -Increase basal/prandial  -consult RD      ANA (acute kidney injury)  Patient with acute kidney injury/acute renal failure likely due to pre-renal azotemia due to IVVD in the setting of osmotic diuresis.  ANA is currently stable. Baseline creatinine  1 years we will every day was pain  - Labs reviewed- Renal function/electrolytes with Estimated Creatinine Clearance: 105.1 mL/min (based on SCr of 1.1 mg/dL). according to latest data.   Monitor urine output and serial BMP and adjust therapy as needed.   Avoid nephrotoxins and renally dose meds for GFR listed above.  Resolved    Hyponatremia  Patient has hyponatremia which is controlled,We will aim to correct the sodium by 4-6mEq in 24 hours. We will monitor sodium Every 12 hours. The hyponatremia is due to Dehydration/hypovolemia and hyperglycemia. We will obtain the following studies: repeat BMP with hyperglycemia is correction. We will treat the hyponatremia with IV fluids as follows: 100 ml/hr LR. The patient's sodium results have been reviewed and are listed below.  Recent Labs   Lab 04/16/24  0326        resolved    Obesity  Body mass index is 33.45 kg/m².   Morbid obesity complicates all aspects of disease management from diagnostic modalities to treatment.   Weight loss encouraged and health benefits explained to patient.           VTE Risk Mitigation (From admission, onward)           Ordered     IP VTE LOW RISK PATIENT  Once         04/14/24 2112                    Discharge Planning   MYLA:      Code Status: Full Code   Is the patient medically ready for discharge?:     Reason for patient still in hospital (select all that apply): Treatment  Discharge Plan A: Home with family (with [facility] information and instructions to call to schedule follow up and establish care for future medical  needs)              Karine Morocho NP  Department of Hospital Medicine   [facility] - Observation

## 2024-04-16 NOTE — PLAN OF CARE
Inpatient Upgrade Note    Roly Candelaria has warranted treatment spanning two or more midnights of hospital level care for the management of  hyperglycemia . He continues to require daily labs and insulin adjustment . His condition is also complicated by the following comorbidities: diabetes and obesity

## 2024-04-16 NOTE — ASSESSMENT & PLAN NOTE
-Patient's FSGs are uncontrolled due to hyperglycemia on current medication regimen.  -mild DKA  -He was diagnosed with type 2 diabetes mellitus 1 year ago  -He tried lifestyle modification but returned to the emergency room with polyuria, polydipsia, fatigue with point of care glucose greater than 500  -Glucose on cmp was 767 mg/dL  -Anion gap was normal at presentation although he was acidotic, AG >12  -Serum bicarbonate was low at 16, with 2+ urine ketones and elevated beta hydroxybutyrate.  -HgbA1c >14  -Increase basal/prandial  -consult RD

## 2024-04-16 NOTE — PLAN OF CARE
04/16/24 0917   Discharge Planning   Assessment Type Discharge Planning Brief Assessment   Resource/Environmental Concerns none   Support Systems Spouse/significant other   Current Living Arrangements home   Patient/Family Anticipates Transition to home with family   Patient/Family Anticipated Services at Transition none   DME Needed Upon Discharge  glucometer  (wife to purchase)   Discharge Plan A Home with family  (with [facility] information and instructions to call to schedule follow up and establish care for future medical needs)         [facility]  [address]  [address]   [place] [address]  Phone: [phone] Fax: [phone]    [id number] [place] - [address]  [address] [place] [address]  Phone: [phone] Fax: [phone]

## 2024-04-17 LAB
ANION GAP SERPL CALC-SCNC: 12 MMOL/L (ref 8–16)
B-OH-BUTYR BLD STRIP-SCNC: 3.8 MMOL/L (ref 0–0.5)
BUN SERPL-MCNC: 9 MG/DL (ref 6–20)
CALCIUM SERPL-MCNC: 8.7 MG/DL (ref 8.7–10.5)
CHLORIDE SERPL-SCNC: 103 MMOL/L (ref 95–110)
CO2 SERPL-SCNC: 23 MMOL/L (ref 23–29)
CREAT SERPL-MCNC: 1.2 MG/DL (ref 0.5–1.4)
ERYTHROCYTE [DISTWIDTH] IN BLOOD BY AUTOMATED COUNT: 13.9 % (ref 11.5–14.5)
EST. GFR  (NO RACE VARIABLE): >60 ML/MIN/1.73 M^2
GLUCOSE SERPL-MCNC: 295 MG/DL (ref 70–110)
HCT VFR BLD AUTO: 40.2 % (ref 40–54)
HGB BLD-MCNC: 13 G/DL (ref 14–18)
MAGNESIUM SERPL-MCNC: 1.7 MG/DL (ref 1.6–2.6)
MCH RBC QN AUTO: 27.7 PG (ref 27–31)
MCHC RBC AUTO-ENTMCNC: 32.3 G/DL (ref 32–36)
MCV RBC AUTO: 86 FL (ref 82–98)
PHOSPHATE SERPL-MCNC: 3.5 MG/DL (ref 2.7–4.5)
PLATELET # BLD AUTO: 230 K/UL (ref 150–450)
PMV BLD AUTO: 11 FL (ref 9.2–12.9)
POCT GLUCOSE: 259 MG/DL (ref 70–110)
POCT GLUCOSE: 270 MG/DL (ref 70–110)
POCT GLUCOSE: 290 MG/DL (ref 70–110)
POCT GLUCOSE: 398 MG/DL (ref 70–110)
POTASSIUM SERPL-SCNC: 3.9 MMOL/L (ref 3.5–5.1)
RBC # BLD AUTO: 4.69 M/UL (ref 4.6–6.2)
SODIUM SERPL-SCNC: 138 MMOL/L (ref 136–145)
WBC # BLD AUTO: 5.63 K/UL (ref 3.9–12.7)

## 2024-04-17 PROCEDURE — 63600175 PHARM REV CODE 636 W HCPCS: Performed by: NURSE PRACTITIONER

## 2024-04-17 PROCEDURE — 80048 BASIC METABOLIC PNL TOTAL CA: CPT | Performed by: STUDENT IN AN ORGANIZED HEALTH CARE EDUCATION/TRAINING PROGRAM

## 2024-04-17 PROCEDURE — 85027 COMPLETE CBC AUTOMATED: CPT | Performed by: STUDENT IN AN ORGANIZED HEALTH CARE EDUCATION/TRAINING PROGRAM

## 2024-04-17 PROCEDURE — 21400001 HC TELEMETRY ROOM

## 2024-04-17 PROCEDURE — 83735 ASSAY OF MAGNESIUM: CPT | Performed by: STUDENT IN AN ORGANIZED HEALTH CARE EDUCATION/TRAINING PROGRAM

## 2024-04-17 PROCEDURE — 84100 ASSAY OF PHOSPHORUS: CPT | Performed by: STUDENT IN AN ORGANIZED HEALTH CARE EDUCATION/TRAINING PROGRAM

## 2024-04-17 PROCEDURE — 82010 KETONE BODYS QUAN: CPT | Performed by: NURSE PRACTITIONER

## 2024-04-17 RX ADMIN — SODIUM CHLORIDE, POTASSIUM CHLORIDE, SODIUM LACTATE AND CALCIUM CHLORIDE: 600; 310; 30; 20 INJECTION, SOLUTION INTRAVENOUS at 03:04

## 2024-04-17 RX ADMIN — INSULIN ASPART 4 UNITS: 100 INJECTION, SOLUTION INTRAVENOUS; SUBCUTANEOUS at 08:04

## 2024-04-17 RX ADMIN — INSULIN ASPART 4 UNITS: 100 INJECTION, SOLUTION INTRAVENOUS; SUBCUTANEOUS at 04:04

## 2024-04-17 RX ADMIN — INSULIN ASPART 3 UNITS: 100 INJECTION, SOLUTION INTRAVENOUS; SUBCUTANEOUS at 08:04

## 2024-04-17 RX ADMIN — INSULIN ASPART 6 UNITS: 100 INJECTION, SOLUTION INTRAVENOUS; SUBCUTANEOUS at 04:04

## 2024-04-17 RX ADMIN — INSULIN ASPART 4 UNITS: 100 INJECTION, SOLUTION INTRAVENOUS; SUBCUTANEOUS at 11:04

## 2024-04-17 RX ADMIN — INSULIN ASPART 6 UNITS: 100 INJECTION, SOLUTION INTRAVENOUS; SUBCUTANEOUS at 08:04

## 2024-04-17 RX ADMIN — INSULIN ASPART 10 UNITS: 100 INJECTION, SOLUTION INTRAVENOUS; SUBCUTANEOUS at 11:04

## 2024-04-17 RX ADMIN — SODIUM CHLORIDE, POTASSIUM CHLORIDE, SODIUM LACTATE AND CALCIUM CHLORIDE: 600; 310; 30; 20 INJECTION, SOLUTION INTRAVENOUS at 10:04

## 2024-04-17 RX ADMIN — SODIUM CHLORIDE, POTASSIUM CHLORIDE, SODIUM LACTATE AND CALCIUM CHLORIDE: 600; 310; 30; 20 INJECTION, SOLUTION INTRAVENOUS at 12:04

## 2024-04-17 NOTE — NURSING
[facility], [facility] [place]  Nurses Note -- 4 Eyes      4/17/2024       Skin assessed on: Q Shift      [x] No Pressure Injuries Present    []Prevention Measures Documented    [] Yes LDA  for Pressure Injury Previously documented     [] Yes New Pressure Injury Discovered   [] LDA for New Pressure Injury Added      Attending RN:  Dl Linares RN     Second RN:  JULI Blevins

## 2024-04-17 NOTE — NURSING
0715 Report received from JULI Matthew. Patient awake and alert. Safety measures in place. Call light within reach.

## 2024-04-17 NOTE — NURSING
[facility], [facility] [place]  Nurses Note -- 4 Eyes      4/16/2024       Skin assessed on: Q Shift      [x] No Pressure Injuries Present    [x]Prevention Measures Documented    [] Yes LDA  for Pressure Injury Previously documented     [] Yes New Pressure Injury Discovered   [] LDA for New Pressure Injury Added      Attending RN:  Dustin Hudson RN     Second RN:  Dl MORALES RN

## 2024-04-17 NOTE — CONSULTS
Food & Nutrition  Education    Diet Education: Low Salt Diet - Fluid Restriction    Learners: Roly Candelaria      Nutrition Education provided with handouts: Low Salt/ Fluid Restrictive Diet      Comments: RD consult for education fluid restriction low salt diet. Pt with other care. Educations attached to pt chart for follow up withRD    Please reconsult as needed    Thanks!     Any [place], Registration Eligible, Provisional LDN

## 2024-04-17 NOTE — ASSESSMENT & PLAN NOTE
Body mass index is 33.05 kg/m².   Morbid obesity complicates all aspects of disease management from diagnostic modalities to treatment.   Weight loss encouraged and health benefits explained to patient.

## 2024-04-17 NOTE — ASSESSMENT & PLAN NOTE
Patient with acute kidney injury/acute renal failure likely due to pre-renal azotemia due to IVVD in the setting of osmotic diuresis.  ANA is currently stable. Baseline creatinine  1 years we will every day was pain  - Labs reviewed- Renal function/electrolytes with Estimated Creatinine Clearance: 96.9 mL/min (based on SCr of 1.2 mg/dL). according to latest data.   Monitor urine output and serial BMP and adjust therapy as needed.   Avoid nephrotoxins and renally dose meds for GFR listed above.  Resolved

## 2024-04-17 NOTE — ASSESSMENT & PLAN NOTE
Patient has hyponatremia which is controlled,We will aim to correct the sodium by 4-6mEq in 24 hours. We will monitor sodium Every 12 hours. The hyponatremia is due to Dehydration/hypovolemia and hyperglycemia. We will obtain the following studies: repeat BMP with hyperglycemia is correction. We will treat the hyponatremia with IV fluids as follows: 100 ml/hr LR. The patient's sodium results have been reviewed and are listed below.  Recent Labs   Lab 04/17/24  0611        resolved

## 2024-04-17 NOTE — ASSESSMENT & PLAN NOTE
Lab Results   Component Value Date    HGBA1C >14.0 (H) 04/15/2024     Recent Labs   Lab 04/17/24  0738   POCTGLUCOSE 270*      -Patient's FSGs are uncontrolled due to hyperglycemia on current medication regimen.  -mild DKA  -He was diagnosed with type 2 diabetes mellitus 1 year ago  -He tried lifestyle modification but returned to the emergency room with polyuria, polydipsia, fatigue with point of care glucose greater than 500  -Glucose on cmp was 767 mg/dL  -Anion gap was normal at presentation although he was acidotic, AG >12  -Serum bicarbonate was low at 16, with 2+ urine ketones and elevated beta hydroxybutyrate.  -HgbA1c >14  Cont current basal/prandial insulin regimen   Low dose correction scale   Cont blood glucose monitoring   BG goal:  Preprandial blood glucose target <140 mg/dL  Random glucoses <180 mg/dL  ADA diet  Antihyperglycemics (From admission, onward)      Start     Stop Route Frequency Ordered    04/17/24 2100  insulin detemir U-100 (Levemir) pen 15 Units         -- SubQ Daily 04/16/24 1246    04/16/24 1245  insulin aspart U-100 pen 4 Units         -- SubQ 3 times daily with meals 04/16/24 1244    04/15/24 0121  insulin aspart U-100 pen 0-10 Units         -- SubQ Before meals & nightly PRN 04/15/24 0027

## 2024-04-17 NOTE — PLAN OF CARE
Problem: Adult Inpatient Plan of Care  Goal: Plan of Care Review  Outcome: Ongoing, Not Progressing  Goal: Patient-Specific Goal (Individualized)  Outcome: Ongoing, Not Progressing  Goal: Absence of Hospital-Acquired Illness or Injury  Outcome: Ongoing, Not Progressing  Goal: Optimal Comfort and Wellbeing  Outcome: Ongoing, Not Progressing  Goal: Readiness for Transition of Care  Outcome: Ongoing, Not Progressing     Problem: Diabetes Comorbidity  Goal: Blood Glucose Level Within Targeted Range  Outcome: Ongoing, Not Progressing     Problem: Fluid and Electrolyte Imbalance (Acute Kidney Injury/Impairment)  Goal: Fluid and Electrolyte Balance  Outcome: Ongoing, Not Progressing     Problem: Oral Intake Inadequate (Acute Kidney Injury/Impairment)  Goal: Optimal Nutrition Intake  Outcome: Ongoing, Not Progressing     Problem: Renal Function Impairment (Acute Kidney Injury/Impairment)  Goal: Effective Renal Function  Outcome: Ongoing, Not Progressing

## 2024-04-17 NOTE — SUBJECTIVE & OBJECTIVE
Fluconazole 150 mg times one dose and may repeat in one week if necessary   Interval History: Patient doing well. He is frustrated his sugars are not yet controlled. Insulin adjusted. He denies any symptoms.     Review of Systems   Constitutional:  Negative for chills, fatigue and fever.   Respiratory:  Negative for cough and shortness of breath.    Cardiovascular:  Negative for chest pain and palpitations.   Gastrointestinal:  Negative for abdominal pain, nausea and vomiting.   Psychiatric/Behavioral:  Negative for agitation and confusion.      Objective:     Vital Signs (Most Recent):  Temp: 97.6 °F (36.4 °C) (04/17/24 0739)  Pulse: 72 (04/17/24 0739)  Resp: 19 (04/17/24 0739)  BP: 114/63 (04/17/24 0739)  SpO2: 97 % (04/17/24 0739) Vital Signs (24h Range):  Temp:  [97.6 °F (36.4 °C)-98.6 °F (37 °C)] 97.6 °F (36.4 °C)  Pulse:  [67-91] 72  Resp:  [16-19] 19  SpO2:  [96 %-99 %] 97 %  BP: (105-118)/(61-76) 114/63     Weight: 98.6 kg (217 lb 6 oz)  Body mass index is 33.05 kg/m².    Intake/Output Summary (Last 24 hours) at 4/17/2024 0842  Last data filed at 4/17/2024 0455  Gross per 24 hour   Intake 720 ml   Output 1200 ml   Net -480 ml         Physical Exam  Vitals and nursing note reviewed.   Constitutional:       General: He is awake. He is not in acute distress.     Appearance: Normal appearance. He is well-developed and well-groomed. He is not ill-appearing, toxic-appearing or diaphoretic.   HENT:      Head: Normocephalic and atraumatic.   Cardiovascular:      Rate and Rhythm: Normal rate.   Pulmonary:      Effort: Pulmonary effort is normal. No tachypnea, accessory muscle usage or respiratory distress.   Neurological:      Mental Status: He is alert and oriented to person, place, and time. Mental status is at baseline.   Psychiatric:         Attention and Perception: Attention normal. He is attentive.         Mood and Affect: Mood normal. Mood is not anxious.         Speech: Speech normal.         Behavior: Behavior normal. Behavior is cooperative.         Thought Content: Thought  content normal.         Cognition and Memory: Cognition and memory normal. Cognition is not impaired. Memory is not impaired. He does not exhibit impaired recent memory.         Judgment: Judgment normal.             Significant Labs: All pertinent labs within the past 24 hours have been reviewed.    Significant Imaging: I have reviewed all pertinent imaging results/findings within the past 24 hours.

## 2024-04-17 NOTE — PROGRESS NOTES
[facility] Medicine  Telemedicine Progress Note    Patient Name: Roly Candelaria  MRN: [id number]  Patient Class: IP- Inpatient   Admission Date: 4/14/2024  Length of Stay: 1 days  Attending Physician: Shun Burkett MD  Primary Care Provider: Marifer, Primary Doctor          Subjective:     Principal Problem:Type 2 diabetes mellitus with hyperglycemia        HPI:  34-year-old  male with medical history significant for type 2 diabetes mellitus-diagnosed January of 2023, and class 1 obesity who presented to the emergency department to get checked out for polyuria, polydipsia and fatigue. He was diagnosed with type 2 diabetes mellitus about 15 months ago but decided on lifestyle modification for his treatment, he voiced losing about 15-20 lb, with improved diet and physical activity but symptoms of polyuria, polydipsia and generalized fatigue were noticed about 3 weeks ago, worsening hence he presented to the emergency room today.  He denied symptoms of dysuria, urgency, straining at micturition or hematuria.  He has not had any fever, chills or rigors.  Denied nausea, no change in bowel habits, no vomiting, diarrhea or constipation.  He has not had any cough, no chest pain, shortness for breath, no orthopnea, no paroxysmal nocturnal dyspnea or pedal swelling.  He denied cigarette smoking but occasionally drinks alcohol. He denied heat or cold intolerance, No past surgical history.  Lab obtained at presentation showed point of care glucose greater than 500, urinalysis showed colorless urine, 4+ glucose, 2+ of ketones.  Beta hydroxybutyrate 4.9, H&H of 14.2/44.2, BUN/creatinine 17/2.1.  Serum bicarbonate of 16.  Chest x-ray showed no acute cardiopulmonary process.    Overview/Hospital Course:  Admission to observation for uncontrolled diabetes/mild DKA. HgbA1c >14. Continue to adjust basal and meal time insulin.     Interval History: Patient doing well. He is frustrated his  sugars are not yet controlled. Insulin adjusted. He denies any symptoms.     Review of Systems   Constitutional:  Negative for chills, fatigue and fever.   Respiratory:  Negative for cough and shortness of breath.    Cardiovascular:  Negative for chest pain and palpitations.   Gastrointestinal:  Negative for abdominal pain, nausea and vomiting.   Psychiatric/Behavioral:  Negative for agitation and confusion.      Objective:     Vital Signs (Most Recent):  Temp: 97.6 °F (36.4 °C) (04/17/24 0739)  Pulse: 72 (04/17/24 0739)  Resp: 19 (04/17/24 0739)  BP: 114/63 (04/17/24 0739)  SpO2: 97 % (04/17/24 0739) Vital Signs (24h Range):  Temp:  [97.6 °F (36.4 °C)-98.6 °F (37 °C)] 97.6 °F (36.4 °C)  Pulse:  [67-91] 72  Resp:  [16-19] 19  SpO2:  [96 %-99 %] 97 %  BP: (105-118)/(61-76) 114/63     Weight: 98.6 kg (217 lb 6 oz)  Body mass index is 33.05 kg/m².    Intake/Output Summary (Last 24 hours) at 4/17/2024 0842  Last data filed at 4/17/2024 0455  Gross per 24 hour   Intake 720 ml   Output 1200 ml   Net -480 ml         Physical Exam  Vitals and nursing note reviewed.   Constitutional:       General: He is awake. He is not in acute distress.     Appearance: Normal appearance. He is well-developed and well-groomed. He is not ill-appearing, toxic-appearing or diaphoretic.   HENT:      Head: Normocephalic and atraumatic.   Cardiovascular:      Rate and Rhythm: Normal rate.   Pulmonary:      Effort: Pulmonary effort is normal. No tachypnea, accessory muscle usage or respiratory distress.   Neurological:      Mental Status: He is alert and oriented to person, place, and time. Mental status is at baseline.   Psychiatric:         Attention and Perception: Attention normal. He is attentive.         Mood and Affect: Mood normal. Mood is not anxious.         Speech: Speech normal.         Behavior: Behavior normal. Behavior is cooperative.         Thought Content: Thought content normal.         Cognition and Memory: Cognition and  memory normal. Cognition is not impaired. Memory is not impaired. He does not exhibit impaired recent memory.         Judgment: Judgment normal.             Significant Labs: All pertinent labs within the past 24 hours have been reviewed.    Significant Imaging: I have reviewed all pertinent imaging results/findings within the past 24 hours.    Assessment/Plan:      * Type 2 diabetes mellitus with hyperglycemia  Lab Results   Component Value Date    HGBA1C >14.0 (H) 04/15/2024     Recent Labs   Lab 04/17/24  0738   POCTGLUCOSE 270*      -Patient's FSGs are uncontrolled due to hyperglycemia on current medication regimen.  -mild DKA  -He was diagnosed with type 2 diabetes mellitus 1 year ago  -He tried lifestyle modification but returned to the emergency room with polyuria, polydipsia, fatigue with point of care glucose greater than 500  -Glucose on cmp was 767 mg/dL  -Anion gap was normal at presentation although he was acidotic, AG >12  -Serum bicarbonate was low at 16, with 2+ urine ketones and elevated beta hydroxybutyrate.  -HgbA1c >14  Cont current basal/prandial insulin regimen   Low dose correction scale   Cont blood glucose monitoring   BG goal:  Preprandial blood glucose target <140 mg/dL  Random glucoses <180 mg/dL  ADA diet  Antihyperglycemics (From admission, onward)      Start     Stop Route Frequency Ordered    04/17/24 2100  insulin detemir U-100 (Levemir) pen 15 Units         -- SubQ Daily 04/16/24 1246    04/16/24 1245  insulin aspart U-100 pen 4 Units         -- SubQ 3 times daily with meals 04/16/24 1244    04/15/24 0121  insulin aspart U-100 pen 0-10 Units         -- SubQ Before meals & nightly PRN 04/15/24 0027               ANA (acute kidney injury)  Patient with acute kidney injury/acute renal failure likely due to pre-renal azotemia due to IVVD in the setting of osmotic diuresis.  ANA is currently stable. Baseline creatinine  1 years we will every day was pain  - Labs reviewed- Renal  function/electrolytes with Estimated Creatinine Clearance: 96.9 mL/min (based on SCr of 1.2 mg/dL). according to latest data.   Monitor urine output and serial BMP and adjust therapy as needed.   Avoid nephrotoxins and renally dose meds for GFR listed above.  Resolved    Hyponatremia  Patient has hyponatremia which is controlled,We will aim to correct the sodium by 4-6mEq in 24 hours. We will monitor sodium Every 12 hours. The hyponatremia is due to Dehydration/hypovolemia and hyperglycemia. We will obtain the following studies: repeat BMP with hyperglycemia is correction. We will treat the hyponatremia with IV fluids as follows: 100 ml/hr LR. The patient's sodium results have been reviewed and are listed below.  Recent Labs   Lab 04/17/24  0611        resolved    Obesity  Body mass index is 33.05 kg/m².   Morbid obesity complicates all aspects of disease management from diagnostic modalities to treatment.   Weight loss encouraged and health benefits explained to patient.           VTE Risk Mitigation (From admission, onward)           Ordered     IP VTE LOW RISK PATIENT  Once         04/14/24 2112                          I have completed this tele-visit without the assistance of a telepresenter.    The attending portion of this evaluation, treatment, and documentation was performed per Shun Snow MD via Telemedicine AudioVisual using the secure [employer] software platform with 2 way audio/video. The provider was located off-site and the patient is located in the hospital. The aforementioned video software was utilized to document the relevant history and physical exam    Shun Snow MD  Department of Hospital Medicine   [facility] [place]

## 2024-04-18 LAB
POCT GLUCOSE: 162 MG/DL (ref 70–110)
POCT GLUCOSE: 169 MG/DL (ref 70–110)
POCT GLUCOSE: 299 MG/DL (ref 70–110)
POCT GLUCOSE: 368 MG/DL (ref 70–110)

## 2024-04-18 PROCEDURE — 63600175 PHARM REV CODE 636 W HCPCS: Performed by: NURSE PRACTITIONER

## 2024-04-18 PROCEDURE — 21400001 HC TELEMETRY ROOM

## 2024-04-18 RX ORDER — INSULIN ASPART 100 [IU]/ML
6 INJECTION, SOLUTION INTRAVENOUS; SUBCUTANEOUS
Status: DISCONTINUED | OUTPATIENT
Start: 2024-04-18 | End: 2024-04-19 | Stop reason: HOSPADM

## 2024-04-18 RX ADMIN — INSULIN ASPART 1 UNITS: 100 INJECTION, SOLUTION INTRAVENOUS; SUBCUTANEOUS at 08:04

## 2024-04-18 RX ADMIN — INSULIN ASPART 4 UNITS: 100 INJECTION, SOLUTION INTRAVENOUS; SUBCUTANEOUS at 08:04

## 2024-04-18 RX ADMIN — INSULIN ASPART 10 UNITS: 100 INJECTION, SOLUTION INTRAVENOUS; SUBCUTANEOUS at 11:04

## 2024-04-18 RX ADMIN — SODIUM CHLORIDE, POTASSIUM CHLORIDE, SODIUM LACTATE AND CALCIUM CHLORIDE: 600; 310; 30; 20 INJECTION, SOLUTION INTRAVENOUS at 08:04

## 2024-04-18 RX ADMIN — SODIUM CHLORIDE, POTASSIUM CHLORIDE, SODIUM LACTATE AND CALCIUM CHLORIDE: 600; 310; 30; 20 INJECTION, SOLUTION INTRAVENOUS at 04:04

## 2024-04-18 RX ADMIN — INSULIN ASPART 6 UNITS: 100 INJECTION, SOLUTION INTRAVENOUS; SUBCUTANEOUS at 08:04

## 2024-04-18 RX ADMIN — INSULIN ASPART 6 UNITS: 100 INJECTION, SOLUTION INTRAVENOUS; SUBCUTANEOUS at 04:04

## 2024-04-18 RX ADMIN — INSULIN ASPART 2 UNITS: 100 INJECTION, SOLUTION INTRAVENOUS; SUBCUTANEOUS at 04:04

## 2024-04-18 RX ADMIN — INSULIN ASPART 6 UNITS: 100 INJECTION, SOLUTION INTRAVENOUS; SUBCUTANEOUS at 11:04

## 2024-04-18 NOTE — PROGRESS NOTES
[facility] Medicine  Telemedicine Progress Note    Patient Name: Roly Candelaria  MRN: [id number]  Patient Class: IP- Inpatient   Admission Date: 4/14/2024  Length of Stay: 2 days  Attending Physician: Shun Burkett MD  Primary Care Provider: Marifer, Primary Doctor          Subjective:     Principal Problem:Type 2 diabetes mellitus with hyperglycemia        HPI:  34-year-old  male with medical history significant for type 2 diabetes mellitus-diagnosed January of 2023, and class 1 obesity who presented to the emergency department to get checked out for polyuria, polydipsia and fatigue. He was diagnosed with type 2 diabetes mellitus about 15 months ago but decided on lifestyle modification for his treatment, he voiced losing about 15-20 lb, with improved diet and physical activity but symptoms of polyuria, polydipsia and generalized fatigue were noticed about 3 weeks ago, worsening hence he presented to the emergency room today.  He denied symptoms of dysuria, urgency, straining at micturition or hematuria.  He has not had any fever, chills or rigors.  Denied nausea, no change in bowel habits, no vomiting, diarrhea or constipation.  He has not had any cough, no chest pain, shortness for breath, no orthopnea, no paroxysmal nocturnal dyspnea or pedal swelling.  He denied cigarette smoking but occasionally drinks alcohol. He denied heat or cold intolerance, No past surgical history.  Lab obtained at presentation showed point of care glucose greater than 500, urinalysis showed colorless urine, 4+ glucose, 2+ of ketones.  Beta hydroxybutyrate 4.9, H&H of 14.2/44.2, BUN/creatinine 17/2.1.  Serum bicarbonate of 16.  Chest x-ray showed no acute cardiopulmonary process.    Overview/Hospital Course:  Admission to observation for uncontrolled diabetes/mild DKA. HgbA1c >14. Continue to adjust basal and meal time insulin.     Interval History: Patient doing well. BS improved but not at goal  yet. He is frustrated his sugars are not yet controlled and is not wanting to discharge until BS are at goal. Insulin adjusted again. He denies any symptoms.     Review of Systems   Constitutional:  Negative for chills, fatigue and fever.   Respiratory:  Negative for cough and shortness of breath.    Cardiovascular:  Negative for chest pain and palpitations.   Gastrointestinal:  Negative for abdominal pain, nausea and vomiting.   Psychiatric/Behavioral:  Negative for agitation and confusion.      Objective:     Vital Signs (Most Recent):  Temp: 98.3 °F (36.8 °C) (04/18/24 0806)  Pulse: 72 (04/18/24 0806)  Resp: 18 (04/18/24 0806)  BP: 121/69 (04/18/24 0806)  SpO2: 97 % (04/18/24 0806) Vital Signs (24h Range):  Temp:  [97.9 °F (36.6 °C)-98.5 °F (36.9 °C)] 98.3 °F (36.8 °C)  Pulse:  [63-88] 72  Resp:  [18-19] 18  SpO2:  [95 %-97 %] 97 %  BP: ()/(55-77) 121/69     Weight: 98.6 kg (217 lb 6 oz)  Body mass index is 33.05 kg/m².    Intake/Output Summary (Last 24 hours) at 4/18/2024 0919  Last data filed at 4/18/2024 0137  Gross per 24 hour   Intake 720 ml   Output 1950 ml   Net -1230 ml         Physical Exam  Vitals and nursing note reviewed.   Constitutional:       General: He is awake. He is not in acute distress.     Appearance: Normal appearance. He is well-developed and well-groomed. He is not ill-appearing, toxic-appearing or diaphoretic.   HENT:      Head: Normocephalic and atraumatic.   Cardiovascular:      Rate and Rhythm: Normal rate.   Pulmonary:      Effort: Pulmonary effort is normal. No tachypnea, accessory muscle usage or respiratory distress.   Neurological:      Mental Status: He is alert and oriented to person, place, and time. Mental status is at baseline.   Psychiatric:         Attention and Perception: Attention normal. He is attentive.         Mood and Affect: Mood normal. Mood is not anxious.         Speech: Speech normal.         Behavior: Behavior normal. Behavior is cooperative.          Thought Content: Thought content normal.         Cognition and Memory: Cognition and memory normal. Cognition is not impaired. Memory is not impaired. He does not exhibit impaired recent memory.         Judgment: Judgment normal.             Significant Labs: All pertinent labs within the past 24 hours have been reviewed.    Significant Imaging: I have reviewed all pertinent imaging results/findings within the past 24 hours.    Assessment/Plan:      * Type 2 diabetes mellitus with hyperglycemia  Lab Results   Component Value Date    HGBA1C >14.0 (H) 04/15/2024     Recent Labs   Lab 04/18/24  0804   POCTGLUCOSE 299*        -Patient's FSGs are uncontrolled due to hyperglycemia on current medication regimen.  -mild DKA  -He was diagnosed with type 2 diabetes mellitus 1 year ago  -He tried lifestyle modification but returned to the emergency room with polyuria, polydipsia, fatigue with point of care glucose greater than 500  -Glucose on cmp was 767 mg/dL  -Anion gap was normal at presentation although he was acidotic, AG >12  -Serum bicarbonate was low at 16, with 2+ urine ketones and elevated beta hydroxybutyrate.  -HgbA1c >14  Cont current basal/prandial insulin regimen   Low dose correction scale   Cont blood glucose monitoring   BG goal:  Preprandial blood glucose target <140 mg/dL  Random glucoses <180 mg/dL  ADA diet  Antihyperglycemics (From admission, onward)      Start     Stop Route Frequency Ordered    04/18/24 2100  insulin detemir U-100 (Levemir) pen 20 Units         -- SubQ Daily 04/18/24 0909    04/18/24 1130  insulin aspart U-100 pen 6 Units         -- SubQ 3 times daily with meals 04/18/24 0909    04/15/24 0121  insulin aspart U-100 pen 0-10 Units         -- SubQ Before meals & nightly PRN 04/15/24 0027               ANA (acute kidney injury)  Patient with acute kidney injury/acute renal failure likely due to pre-renal azotemia due to IVVD in the setting of osmotic diuresis.  ANA is currently stable.  Baseline creatinine  1 years we will every day was pain  - Labs reviewed- Renal function/electrolytes with Estimated Creatinine Clearance: 96.9 mL/min (based on SCr of 1.2 mg/dL). according to latest data.   Monitor urine output and serial BMP and adjust therapy as needed.   Avoid nephrotoxins and renally dose meds for GFR listed above.  Resolved    Hyponatremia  Patient has hyponatremia which is controlled,We will aim to correct the sodium by 4-6mEq in 24 hours. We will monitor sodium Every 12 hours. The hyponatremia is due to Dehydration/hypovolemia and hyperglycemia. We will obtain the following studies: repeat BMP with hyperglycemia is correction. We will treat the hyponatremia with IV fluids as follows: 100 ml/hr LR. The patient's sodium results have been reviewed and are listed below.  Recent Labs   Lab 04/17/24  0611        resolved    Obesity  Body mass index is 33.05 kg/m².   Morbid obesity complicates all aspects of disease management from diagnostic modalities to treatment.   Weight loss encouraged and health benefits explained to patient.           VTE Risk Mitigation (From admission, onward)           Ordered     IP VTE LOW RISK PATIENT  Once         04/14/24 2112                          I have completed this tele-visit without the assistance of a telepresenter.    The attending portion of this evaluation, treatment, and documentation was performed per Shun Snow MD via Telemedicine AudioVisual using the secure [address] software platform with 2 way audio/video. The provider was located off-site and the patient is located in the hospital. The aforementioned video software was utilized to document the relevant history and physical exam    Shun Snow MD  Department of Hospital Medicine   [facility] [place] [facility]

## 2024-04-18 NOTE — SUBJECTIVE & OBJECTIVE
Interval History: Patient doing well. BS improved but not at goal yet. He is frustrated his sugars are not yet controlled and is not wanting to discharge until BS are at goal. Insulin adjusted again. He denies any symptoms.     Review of Systems   Constitutional:  Negative for chills, fatigue and fever.   Respiratory:  Negative for cough and shortness of breath.    Cardiovascular:  Negative for chest pain and palpitations.   Gastrointestinal:  Negative for abdominal pain, nausea and vomiting.   Psychiatric/Behavioral:  Negative for agitation and confusion.      Objective:     Vital Signs (Most Recent):  Temp: 98.3 °F (36.8 °C) (04/18/24 0806)  Pulse: 72 (04/18/24 0806)  Resp: 18 (04/18/24 0806)  BP: 121/69 (04/18/24 0806)  SpO2: 97 % (04/18/24 0806) Vital Signs (24h Range):  Temp:  [97.9 °F (36.6 °C)-98.5 °F (36.9 °C)] 98.3 °F (36.8 °C)  Pulse:  [63-88] 72  Resp:  [18-19] 18  SpO2:  [95 %-97 %] 97 %  BP: ()/(55-77) 121/69     Weight: 98.6 kg (217 lb 6 oz)  Body mass index is 33.05 kg/m².    Intake/Output Summary (Last 24 hours) at 4/18/2024 0919  Last data filed at 4/18/2024 0137  Gross per 24 hour   Intake 720 ml   Output 1950 ml   Net -1230 ml         Physical Exam  Vitals and nursing note reviewed.   Constitutional:       General: He is awake. He is not in acute distress.     Appearance: Normal appearance. He is well-developed and well-groomed. He is not ill-appearing, toxic-appearing or diaphoretic.   HENT:      Head: Normocephalic and atraumatic.   Cardiovascular:      Rate and Rhythm: Normal rate.   Pulmonary:      Effort: Pulmonary effort is normal. No tachypnea, accessory muscle usage or respiratory distress.   Neurological:      Mental Status: He is alert and oriented to person, place, and time. Mental status is at baseline.   Psychiatric:         Attention and Perception: Attention normal. He is attentive.         Mood and Affect: Mood normal. Mood is not anxious.         Speech: Speech normal.          Behavior: Behavior normal. Behavior is cooperative.         Thought Content: Thought content normal.         Cognition and Memory: Cognition and memory normal. Cognition is not impaired. Memory is not impaired. He does not exhibit impaired recent memory.         Judgment: Judgment normal.             Significant Labs: All pertinent labs within the past 24 hours have been reviewed.    Significant Imaging: I have reviewed all pertinent imaging results/findings within the past 24 hours.

## 2024-04-18 NOTE — PLAN OF CARE
Case Management Assessment     PCP: Pt will ask spouse for PCP'S name.  Pharmacy: [facility]    Patient Arrived From: Home  Existing Help at Home: Mirela- spouse    Barriers to Discharge: None    Discharge Plan:    A. Home with family   B. Home with family    CM discussed discharge planning with pt. Pt is independent and doesn't use DME. Pt's spouse, Mirela will provide transportation home when discharged.       04/18/24 1335   Discharge Assessment   Assessment Type Discharge Planning Assessment   Confirmed/corrected address, phone number and insurance Yes   Confirmed Demographics Correct on Facesheet   Source of Information patient   When was your last doctors appointment? 02/24/23   Reason For Admission Type 2 diabetes mellitus with hyperglycemia   People in Home spouse   Facility Arrived From: Home   Do you expect to return to your current living situation? Yes   Do you have help at home or someone to help you manage your care at home? Yes   Who are your caregiver(s) and their phone number(s)? Rachell spouse [phone]   Prior to hospitilization cognitive status: Alert/Oriented   Current cognitive status: Alert/Oriented   Walking or Climbing Stairs Difficulty no   Dressing/Bathing Difficulty no   Equipment Currently Used at Home none   Readmission within 30 days? No   Patient currently being followed by outpatient case management? No   Do you currently have service(s) that help you manage your care at home? No   Do you take prescription medications? Yes   Do you have prescription coverage? Yes   Coverage [employer]   Do you have any problems affording any of your prescribed medications? No   Who is going to help you get home at discharge? Mirela- spouse   How do you get to doctors appointments? car, drives self   Are you on dialysis? No   Do you take coumadin? No   Discharge Plan A Home with family   Discharge Plan B Home with family   DME Needed Upon Discharge  glucometer   Discharge Plan discussed with: Patient    Transition of Care Barriers None   SDOH   (none)   Physical Activity   On average, how many days per week do you engage in moderate to strenuous exercise (like a brisk walk)? 5 days   On average, how many minutes do you engage in exercise at this level? 60 min   Financial Resource Strain   How hard is it for you to pay for the very basics like food, housing, medical care, and heating? Not hard   Housing Stability   In the last 12 months, was there a time when you were not able to pay the mortgage or rent on time? N   In the past 12 months, how many times have you moved where you were living? 1   At any time in the past 12 months, were you homeless or living in a shelter (including now)? N   Transportation Needs   In the past 12 months, has lack of transportation kept you from medical appointments or from getting medications? no   In the past 12 months, has lack of transportation kept you from meetings, work, or from getting things needed for daily living? No   Food Insecurity   Within the past 12 months, you worried that your food would run out before you got the money to buy more. Never true   Within the past 12 months, the food you bought just didn't last and you didn't have money to get more. Never true   Stress   Do you feel stress - tense, restless, nervous, or anxious, or unable to sleep at night because your mind is troubled all the time - these days? To some exte   Social Connections   In a typical week, how many times do you talk on the phone with family, friends, or neighbors? More than 3   How often do you get together with friends or relatives? More than 3   How often do you attend Orthodox or Church services? Never   Do you belong to any clubs or organizations such as Orthodox groups, unions, fraternal or athletic groups, or school groups? No   How often do you attend meetings of the clubs or organizations you belong to? Never   Are you , , , , never , or living  with a partner?    Alcohol Use   Q1: How often do you have a drink containing alcohol? Never   Q2: How many drinks containing alcohol do you have on a typical day when you are drinking? None   Q3: How often do you have six or more drinks on one occasion? Never   OTHER   Name(s) of People in Home Mirela- spouse

## 2024-04-18 NOTE — NURSING
Pt was transferred from Telemetry, AAOx4, Vital signs stable transferred to bed independently. No complaints of Nausea and Vomiting, Urinal provided. Bed in low position, wheels locked. Side rails up. Call light within reach.  ml/hr infusing.

## 2024-04-18 NOTE — ASSESSMENT & PLAN NOTE
Lab Results   Component Value Date    HGBA1C >14.0 (H) 04/15/2024     Recent Labs   Lab 04/18/24  0804   POCTGLUCOSE 299*        -Patient's FSGs are uncontrolled due to hyperglycemia on current medication regimen.  -mild DKA  -He was diagnosed with type 2 diabetes mellitus 1 year ago  -He tried lifestyle modification but returned to the emergency room with polyuria, polydipsia, fatigue with point of care glucose greater than 500  -Glucose on cmp was 767 mg/dL  -Anion gap was normal at presentation although he was acidotic, AG >12  -Serum bicarbonate was low at 16, with 2+ urine ketones and elevated beta hydroxybutyrate.  -HgbA1c >14  Cont current basal/prandial insulin regimen   Low dose correction scale   Cont blood glucose monitoring   BG goal:  Preprandial blood glucose target <140 mg/dL  Random glucoses <180 mg/dL  ADA diet  Antihyperglycemics (From admission, onward)    Start     Stop Route Frequency Ordered    04/18/24 2100  insulin detemir U-100 (Levemir) pen 20 Units         -- SubQ Daily 04/18/24 0909    04/18/24 1130  insulin aspart U-100 pen 6 Units         -- SubQ 3 times daily with meals 04/18/24 0909    04/15/24 0121  insulin aspart U-100 pen 0-10 Units         -- SubQ Before meals & nightly PRN 04/15/24 0027

## 2024-04-18 NOTE — PLAN OF CARE
Patient remained free of fall/injury during shift. Patient educated on safety precautions. Patient verbalized understanding of instructions. Patient updated on POC throughout shift.  Patient in bed, AAOX4, RR even and unlabored on room air.  LR infusing at 100 mL/hr as ordered to PIV.  No acute distress noted. Will report to night nurse.      Problem: Adult Inpatient Plan of Care  Goal: Plan of Care Review  Outcome: Ongoing, Progressing  Flowsheets (Taken 4/18/2024 4639)  Plan of Care Reviewed With: patient  Goal: Patient-Specific Goal (Individualized)  Outcome: Ongoing, Progressing  Goal: Absence of Hospital-Acquired Illness or Injury  Outcome: Ongoing, Progressing  Goal: Optimal Comfort and Wellbeing  Outcome: Ongoing, Progressing  Goal: Readiness for Transition of Care  Outcome: Ongoing, Progressing     Problem: Diabetes Comorbidity  Goal: Blood Glucose Level Within Targeted Range  Outcome: Ongoing, Progressing     Problem: Fluid and Electrolyte Imbalance (Acute Kidney Injury/Impairment)  Goal: Fluid and Electrolyte Balance  Outcome: Ongoing, Progressing     Problem: Oral Intake Inadequate (Acute Kidney Injury/Impairment)  Goal: Optimal Nutrition Intake  Outcome: Ongoing, Progressing     Problem: Renal Function Impairment (Acute Kidney Injury/Impairment)  Goal: Effective Renal Function  Outcome: Ongoing, Progressing

## 2024-04-18 NOTE — PROGRESS NOTES
Food & Nutrition  Education    Diet Education: Dm edu   Time Spent: 30 mins  Learners: pt      Nutrition Education provided with handouts: Carb Counting for diabetics, food exchange list      Comments: Touched on examples of CHO. Went over examples of CHO servings. Touched on reading food labels. Left handouts at pt's bedside.       All questions and concerns answered. Dietitian's contact information provided.       Follow-Up: 1x/weekly    Please Re-consult as needed        Thanks!

## 2024-04-18 NOTE — NURSING
[facility] [place]  Nurses Note -- 4 Eyes      4/18/2024       Skin assessed on: Q Shift      [x] No Pressure Injuries Present    []Prevention Measures Documented    [] Yes LDA  for Pressure Injury Previously documented     [] Yes New Pressure Injury Discovered   [] LDA for New Pressure Injury Added      Attending RN:  Kandy Cao RN     Second RN:  JULI Titus

## 2024-04-18 NOTE — PLAN OF CARE
Problem: Adult Inpatient Plan of Care  Goal: Absence of Hospital-Acquired Illness or Injury  Outcome: Ongoing, Progressing  Goal: Optimal Comfort and Wellbeing  Outcome: Ongoing, Progressing     Problem: Renal Function Impairment (Acute Kidney Injury/Impairment)  Goal: Effective Renal Function  Outcome: Ongoing, Progressing

## 2024-04-19 ENCOUNTER — NURSE TRIAGE (OUTPATIENT)
Dept: ADMINISTRATIVE | Facility: CLINIC | Age: 34
End: 2024-04-19
Payer: COMMERCIAL

## 2024-04-19 ENCOUNTER — HOSPITAL ENCOUNTER (EMERGENCY)
Facility: HOSPITAL | Age: 34
Discharge: HOME | End: 2024-04-20
Attending: STUDENT IN AN ORGANIZED HEALTH CARE EDUCATION/TRAINING PROGRAM
Payer: COMMERCIAL

## 2024-04-19 VITALS
SYSTOLIC BLOOD PRESSURE: 130 MMHG | DIASTOLIC BLOOD PRESSURE: 80 MMHG | OXYGEN SATURATION: 98 % | WEIGHT: 220 LBS | RESPIRATION RATE: 16 BRPM | BODY MASS INDEX: 33.45 KG/M2 | TEMPERATURE: 98 F | HEART RATE: 72 BPM

## 2024-04-19 VITALS
RESPIRATION RATE: 20 BRPM | DIASTOLIC BLOOD PRESSURE: 74 MMHG | OXYGEN SATURATION: 97 % | HEART RATE: 73 BPM | SYSTOLIC BLOOD PRESSURE: 117 MMHG | BODY MASS INDEX: 32.94 KG/M2 | TEMPERATURE: 99 F | HEIGHT: 68 IN | WEIGHT: 217.38 LBS

## 2024-04-19 DIAGNOSIS — R73.9 HYPERGLYCEMIA: Primary | ICD-10-CM

## 2024-04-19 LAB
POCT GLUCOSE: 176 MG/DL (ref 70–110)
POCT GLUCOSE: 180 MG/DL (ref 70–110)
POCT GLUCOSE: 191 MG/DL (ref 70–110)
POCT GLUCOSE: 229 MG/DL (ref 70–110)

## 2024-04-19 PROCEDURE — 63600175 PHARM REV CODE 636 W HCPCS: Performed by: NURSE PRACTITIONER

## 2024-04-19 PROCEDURE — 99284 EMERGENCY DEPT VISIT MOD MDM: CPT

## 2024-04-19 PROCEDURE — 82962 GLUCOSE BLOOD TEST: CPT

## 2024-04-19 RX ORDER — INSULIN ASPART 100 [IU]/ML
0-10 INJECTION, SOLUTION INTRAVENOUS; SUBCUTANEOUS
Qty: 3 ML | Refills: 3 | Status: SHIPPED | OUTPATIENT
Start: 2024-04-19 | End: 2024-04-19 | Stop reason: SDUPTHER

## 2024-04-19 RX ORDER — INSULIN GLARGINE 100 [IU]/ML
20 INJECTION, SOLUTION SUBCUTANEOUS DAILY
Qty: 15 ML | Refills: 3 | Status: SHIPPED | OUTPATIENT
Start: 2024-04-19 | End: 2024-04-29 | Stop reason: SDUPTHER

## 2024-04-19 RX ORDER — PEN NEEDLE, DIABETIC 30 GX3/16"
NEEDLE, DISPOSABLE MISCELLANEOUS
Qty: 100 EACH | Refills: 3 | Status: SHIPPED | OUTPATIENT
Start: 2024-04-19

## 2024-04-19 RX ORDER — INSULIN ASPART 100 [IU]/ML
6 INJECTION, SOLUTION INTRAVENOUS; SUBCUTANEOUS
Qty: 15 ML | Refills: 3 | Status: SHIPPED | OUTPATIENT
Start: 2024-04-19 | End: 2024-06-05

## 2024-04-19 RX ORDER — INSULIN ASPART 100 [IU]/ML
6 INJECTION, SOLUTION INTRAVENOUS; SUBCUTANEOUS
Qty: 15 ML | Refills: 3 | Status: SHIPPED | OUTPATIENT
Start: 2024-04-19 | End: 2024-04-19

## 2024-04-19 RX ORDER — INSULIN GLARGINE 100 [IU]/ML
20 INJECTION, SOLUTION SUBCUTANEOUS DAILY
Qty: 15 ML | Refills: 3 | Status: SHIPPED | OUTPATIENT
Start: 2024-04-19 | End: 2024-04-19

## 2024-04-19 RX ADMIN — INSULIN ASPART 2 UNITS: 100 INJECTION, SOLUTION INTRAVENOUS; SUBCUTANEOUS at 09:04

## 2024-04-19 RX ADMIN — INSULIN ASPART 2 UNITS: 100 INJECTION, SOLUTION INTRAVENOUS; SUBCUTANEOUS at 12:04

## 2024-04-19 RX ADMIN — INSULIN ASPART 6 UNITS: 100 INJECTION, SOLUTION INTRAVENOUS; SUBCUTANEOUS at 04:04

## 2024-04-19 RX ADMIN — SODIUM CHLORIDE, POTASSIUM CHLORIDE, SODIUM LACTATE AND CALCIUM CHLORIDE: 600; 310; 30; 20 INJECTION, SOLUTION INTRAVENOUS at 04:04

## 2024-04-19 RX ADMIN — INSULIN ASPART 6 UNITS: 100 INJECTION, SOLUTION INTRAVENOUS; SUBCUTANEOUS at 09:04

## 2024-04-19 RX ADMIN — INSULIN ASPART 2 UNITS: 100 INJECTION, SOLUTION INTRAVENOUS; SUBCUTANEOUS at 04:04

## 2024-04-19 RX ADMIN — INSULIN ASPART 6 UNITS: 100 INJECTION, SOLUTION INTRAVENOUS; SUBCUTANEOUS at 12:04

## 2024-04-19 NOTE — NURSING
[facility], [facility] [place]  Nurses Note -- 4 Eyes      4/18/2024       Skin assessed on: Q Shift      [x] No Pressure Injuries Present    []Prevention Measures Documented    [] Yes LDA  for Pressure Injury Previously documented     [] Yes New Pressure Injury Discovered   [] LDA for New Pressure Injury Added      Attending RN:  Kandy Cao RN     Second RN:  JULI Arvizu

## 2024-04-19 NOTE — DISCHARGE SUMMARY
[facility] Medicine  Discharge Summary      Patient Name: Roly Candelaria  MRN: [id number]  Patient Class: IP- Inpatient  Admission Date: 4/14/2024  Hospital Length of Stay: 3 days  Discharge Date and Time:  04/19/2024 10:18 AM  Attending Physician: Shun Burkett MD   Discharging Provider: Shun Snow MD  Primary Care Provider: Marifer, Primary Doctor      HPI:   34-year-old  male with medical history significant for type 2 diabetes mellitus-diagnosed January of 2023, and class 1 obesity who presented to the emergency department to get checked out for polyuria, polydipsia and fatigue. He was diagnosed with type 2 diabetes mellitus about 15 months ago but decided on lifestyle modification for his treatment, he voiced losing about 15-20 lb, with improved diet and physical activity but symptoms of polyuria, polydipsia and generalized fatigue were noticed about 3 weeks ago, worsening hence he presented to the emergency room today.  He denied symptoms of dysuria, urgency, straining at micturition or hematuria.  He has not had any fever, chills or rigors.  Denied nausea, no change in bowel habits, no vomiting, diarrhea or constipation.  He has not had any cough, no chest pain, shortness for breath, no orthopnea, no paroxysmal nocturnal dyspnea or pedal swelling.  He denied cigarette smoking but occasionally drinks alcohol. He denied heat or cold intolerance, No past surgical history.  Lab obtained at presentation showed point of care glucose greater than 500, urinalysis showed colorless urine, 4+ glucose, 2+ of ketones.  Beta hydroxybutyrate 4.9, H&H of 14.2/44.2, BUN/creatinine 17/2.1.  Serum bicarbonate of 16.  Chest x-ray showed no acute cardiopulmonary process.    * No surgery found *      Hospital Course:   Admission to observation for uncontrolled diabetes/mild DKA. HgbA1c >14. Continue to adjust basal and meal time insulin.      Goals of Care Treatment Preferences:  Code Status:  Full Code      Consults:   Consults (From admission, onward)          Status Ordering Provider     Inpatient virtual consult to Hospital Medicine  Once        Provider:  (Not yet assigned)    Completed LORRIE COCHRAN     Inpatient consult to Registered Dietitian/Nutritionist  Once        Provider:  (Not yet assigned)    Completed LORRIE COCHRAN     Inpatient consult to Registered Dietitian/Nutritionist  Once        Provider:  (Not yet assigned)    Completed MARGI MILAN     Case Management/  Once        Provider:  (Not yet assigned)    Completed MARGI MILAN            Renal/  ANA (acute kidney injury)  Patient with acute kidney injury/acute renal failure likely due to pre-renal azotemia due to IVVD in the setting of osmotic diuresis.  ANA is currently stable. Baseline creatinine  1 years we will every day was pain  - Labs reviewed- Renal function/electrolytes with Estimated Creatinine Clearance: 96.9 mL/min (based on SCr of 1.2 mg/dL). according to latest data.   Monitor urine output and serial BMP and adjust therapy as needed.   Avoid nephrotoxins and renally dose meds for GFR listed above.  Resolved    Endocrine  * Type 2 diabetes mellitus with hyperglycemia  Lab Results   Component Value Date    HGBA1C >14.0 (H) 04/15/2024     Recent Labs   Lab 04/19/24  0656   POCTGLUCOSE 180*        -Patient's FSGs are uncontrolled due to hyperglycemia on current medication regimen.  -mild DKA  -He was diagnosed with type 2 diabetes mellitus 1 year ago  -He tried lifestyle modification but returned to the emergency room with polyuria, polydipsia, fatigue with point of care glucose greater than 500  -Glucose on cmp was 767 mg/dL  -Anion gap was normal at presentation although he was acidotic, AG >12  -Serum bicarbonate was low at 16, with 2+ urine ketones and elevated beta hydroxybutyrate.  -HgbA1c >14  Cont current basal/prandial insulin regimen   Low dose correction scale   Cont blood  "glucose monitoring   BG goal:  Preprandial blood glucose target <140 mg/dL  Random glucoses <180 mg/dL  ADA diet    BS at goal. Stable for discharge  Ambulatory referral to endocrinology.     Antihyperglycemics (From admission, onward)      Start     Stop Route Frequency Ordered    04/18/24 2100  insulin detemir U-100 (Levemir) pen 20 Units         -- SubQ Daily 04/18/24 0909    04/18/24 1130  insulin aspart U-100 pen 6 Units         -- SubQ 3 times daily with meals 04/18/24 0909    04/15/24 0121  insulin aspart U-100 pen 0-10 Units         -- SubQ Before meals & nightly PRN 04/15/24 0027               Hyponatremia  Patient has hyponatremia which is controlled,We will aim to correct the sodium by 4-6mEq in 24 hours. We will monitor sodium Every 12 hours. The hyponatremia is due to Dehydration/hypovolemia and hyperglycemia. We will obtain the following studies: repeat BMP with hyperglycemia is correction. We will treat the hyponatremia with IV fluids as follows: 100 ml/hr LR. The patient's sodium results have been reviewed and are listed below.  No results for input(s): "NA" in the last 24 hours.  resolved    Obesity  Body mass index is 33.05 kg/m².   Morbid obesity complicates all aspects of disease management from diagnostic modalities to treatment.   Weight loss encouraged and health benefits explained to patient.           Final Active Diagnoses:    Diagnosis Date Noted POA    PRINCIPAL PROBLEM:  Type 2 diabetes mellitus with hyperglycemia [E11.65] 02/15/2022 Yes    Hyponatremia [E87.1] 04/15/2024 Yes    ANA (acute kidney injury) [N17.9] 04/15/2024 Yes    Obesity [E66.9] 02/15/2022 Yes      Problems Resolved During this Admission:       Discharged Condition: good    Disposition: Home or Self Care    Follow Up:   Follow-up Information       [facility] - Follow up.    Why: please call at time of discharge to schedule follow up appointment and establish care for future medical needs  Contact " "information:  [address] [place] [address]  [phone]               [facility] - Endocrinology Follow up.    Specialty: Endocrinology  Contact information:  [address]  [place] [address] [phone]  Additional information:  Please park in surface lot and use [facility] entrance. Check in at main registration.                          Patient Instructions:      DIABETIC INSULIN SUPPLIES FOR HOME USE     Order Specific Question Answer Comments   Height: 5' 8" (1.727 m)    Weight: 98.6 kg (217 lb 6 oz)    Does patient have medical equipment at home? none    Length of need (1-99 months): 99    Is the Patient insulin dependent? Yes    How many times each day does your Patient test his or her glucose level? QID    Do you follow the Patient at least every 6 months for Management of Diabetes? No    Home blood glucose monitor? Yes    [facility] : Select 1 1    Lancing device? Yes    Blood glucose strips? Yes    [facility] : How many test strips needed? 400    Lancets? Yes    [facility] : How many lancets needed? 400    Control solution? Yes    Control Solution CPT Code: [facility]     Alcohol wipes? Yes      Ambulatory referral/consult to Endocrinology   Standing Status: Future   Referral Priority: Routine Referral Type: Consultation   Requested Specialty: Endocrinology   Number of Visits Requested: 1     Diet diabetic     Notify your health care provider if you experience any of the following:  increased confusion or weakness     Activity as tolerated       Significant Diagnostic Studies: N/A    Pending Diagnostic Studies:       None           Medications:  Reconciled Home Medications:      Medication List        START taking these medications      insulin aspart U-100 100 unit/mL (3 mL) Inpn pen  Commonly known as: NovoLOG  Inject 6 Units into the skin 3 (three) times daily with meals. Inject 0-10 Units into the skin before meals and at bedtime as needed (Hyperglycemia).   " "  insulin glargine 100 units/mL SubQ pen  Inject 20 Units into the skin once daily.            ASK your doctor about these medications      pen needle, diabetic 32 gauge x 5/32" Ndle  USE TO INJECT INSULIN FOUR TIMES DAILY AS DIRECTED              Indwelling Lines/Drains at time of discharge:   Lines/Drains/Airways       None                   Time spent on the discharge of patient: 35 minutes         The attending portion of this evaluation, treatment, and documentation was performed per Shun Snow MD via Telemedicine AudioVisual using the secure [employer] software platform with 2 way audio/video. The provider was located off-site and the patient is located in the hospital. The aforementioned video software was utilized to document the relevant history and physical exam    Shun Snow MD  Department of Hospital Medicine  [facility]  "

## 2024-04-19 NOTE — PLAN OF CARE
Problem: Adult Inpatient Plan of Care  Goal: Plan of Care Review  Outcome: Adequate for Care Transition  Goal: Patient-Specific Goal (Individualized)  Outcome: Adequate for Care Transition  Goal: Absence of Hospital-Acquired Illness or Injury  Outcome: Adequate for Care Transition  Goal: Optimal Comfort and Wellbeing  Outcome: Adequate for Care Transition  Goal: Readiness for Transition of Care  Outcome: Adequate for Care Transition     Problem: Diabetes Comorbidity  Goal: Blood Glucose Level Within Targeted Range  Outcome: Adequate for Care Transition     Problem: Fluid and Electrolyte Imbalance (Acute Kidney Injury/Impairment)  Goal: Fluid and Electrolyte Balance  Outcome: Adequate for Care Transition     Problem: Oral Intake Inadequate (Acute Kidney Injury/Impairment)  Goal: Optimal Nutrition Intake  Outcome: Adequate for Care Transition     Problem: Renal Function Impairment (Acute Kidney Injury/Impairment)  Goal: Effective Renal Function  Outcome: Adequate for Care Transition

## 2024-04-19 NOTE — PLAN OF CARE
Case Management Final Discharge Note    Discharge Disposition: Home    New DME ordered / company name: None    Relevant SDOH / Transition of Care Barriers:  None    Primary Caretaker and contact information: Mirela    Scheduled followup appointment: PCP and endocrinology    Referrals placed: Endocrinology    Transportation: Pt's spouse will provide transportation home when discharged.    Patient and family educated on discharge services and updated on DC plan. Bedside RN notified, patient clear to discharge from Case Management Perspective.       04/19/24 0928   Final Note   Assessment Type Final Discharge Note   Anticipated Discharge Disposition Home   What phone number can be called within the next 1-3 days to see how you are doing after discharge? [phone]   Hospital Resources/Appts/Education Provided Appointments scheduled and added to AVS   Post-Acute Status   Coverage [employer]BS   Discharge Delays None known at this time

## 2024-04-19 NOTE — ASSESSMENT & PLAN NOTE
"Patient has hyponatremia which is controlled,We will aim to correct the sodium by 4-6mEq in 24 hours. We will monitor sodium Every 12 hours. The hyponatremia is due to Dehydration/hypovolemia and hyperglycemia. We will obtain the following studies: repeat BMP with hyperglycemia is correction. We will treat the hyponatremia with IV fluids as follows: 100 ml/hr LR. The patient's sodium results have been reviewed and are listed below.  No results for input(s): "NA" in the last 24 hours.  resolved  "

## 2024-04-19 NOTE — PLAN OF CARE
Problem: Adult Inpatient Plan of Care  Goal: Absence of Hospital-Acquired Illness or Injury  Outcome: Ongoing, Progressing  Intervention: Identify and Manage Fall Risk  Flowsheets (Taken 4/19/2024 0429)  Safety Promotion/Fall Prevention:   assistive device/personal item within reach   side rails raised x 2   commode/urinal/bedpan at bedside  Intervention: Prevent Skin Injury  Flowsheets (Taken 4/19/2024 0429)  Body Position: position changed independently

## 2024-04-19 NOTE — ASSESSMENT & PLAN NOTE
Lab Results   Component Value Date    HGBA1C >14.0 (H) 04/15/2024     Recent Labs   Lab 04/19/24  0656   POCTGLUCOSE 180*        -Patient's FSGs are uncontrolled due to hyperglycemia on current medication regimen.  -mild DKA  -He was diagnosed with type 2 diabetes mellitus 1 year ago  -He tried lifestyle modification but returned to the emergency room with polyuria, polydipsia, fatigue with point of care glucose greater than 500  -Glucose on cmp was 767 mg/dL  -Anion gap was normal at presentation although he was acidotic, AG >12  -Serum bicarbonate was low at 16, with 2+ urine ketones and elevated beta hydroxybutyrate.  -HgbA1c >14  Cont current basal/prandial insulin regimen   Low dose correction scale   Cont blood glucose monitoring   BG goal:  Preprandial blood glucose target <140 mg/dL  Random glucoses <180 mg/dL  ADA diet    BS at goal. Stable for discharge  Ambulatory referral to endocrinology.     Antihyperglycemics (From admission, onward)      Start     Stop Route Frequency Ordered    04/18/24 2100  insulin detemir U-100 (Levemir) pen 20 Units         -- SubQ Daily 04/18/24 0909    04/18/24 1130  insulin aspart U-100 pen 6 Units         -- SubQ 3 times daily with meals 04/18/24 0909    04/15/24 0121  insulin aspart U-100 pen 0-10 Units         -- SubQ Before meals & nightly PRN 04/15/24 0027

## 2024-04-20 PROCEDURE — 96372 THER/PROPH/DIAG INJ SC/IM: CPT | Performed by: PHYSICIAN ASSISTANT

## 2024-04-20 PROCEDURE — 25000003 PHARM REV CODE 250: Performed by: PHYSICIAN ASSISTANT

## 2024-04-20 RX ADMIN — INSULIN DETEMIR 20 UNITS: 100 INJECTION, SOLUTION SUBCUTANEOUS at 12:04

## 2024-04-20 NOTE — ED NOTES
Pt discharged earlier today and was unable to get his insulin from the pharmacy so would like to get his shots here. Pt has no symptoms and no complaints

## 2024-04-20 NOTE — TELEPHONE ENCOUNTER
Reason for Disposition   Patient sounds very sick or weak to the triager     Pt has no way to get insulin as pharmacy rx was sent to is now closed and other 24 hour pharmacies will not have med in stock till Monday. Rec ED now. Pt agrees    Additional Information   Negative: Unconscious or difficult to awaken   Negative: Acting confused (e.g., disoriented, slurred speech)   Negative: Very weak (e.g., can't stand)   Negative: Sounds like a life-threatening emergency to the triager   Negative: [1] Vomiting AND [2] signs of dehydration (e.g., very dry mouth, lightheaded, dark urine)   Negative: [1] Blood glucose > 240 mg/dL (13.3 mmol/L) AND [2] rapid breathing   Negative: Blood glucose > 500 mg/dL (27.8 mmol/L)   Negative: [1] Blood glucose > 240 mg/dL (13.3 mmol/L) AND [2] urine ketones moderate-large (or more than 1+)   Negative: [1] Blood glucose > 240 mg/dL (13.3 mmol/L) AND [2] blood ketones > 1.4 mmol/L   Negative: [1] Blood glucose > 240 mg/dL (13.3 mmol/L) AND [2] vomiting AND [3] unable to check for ketones (in blood or urine)   Negative: [1] New-onset diabetes suspected (e.g., frequent urination, weak, weight loss) AND [2] vomiting or rapid breathing   Negative: Vomiting lasts > 4 hours    Protocols used: Diabetes - High Blood Sugar-A-  Pt called stating he was dc'd today- pt reports he tried to get insulin at [facility] but its closed. both 24 hours pharmacies won't have rx till Monday. BG= not checked yet. request pt check BG= pt reports he has to down load an demetria and will take 10 mins to check. WP=032 at 407pm. pt states didn't eat after 6pm. denies sx. Rec ED or option to get in touch with  Since pt has no access to insulin rec ED now. Pt agrees.

## 2024-04-20 NOTE — ED PROVIDER NOTES
Encounter Date: 4/19/2024       History     Chief Complaint   Patient presents with    Medication Refill     Pt discharged from hospital today and went to  insulin but the pharmacy closed. He just needs to get his shots     34-year-old male presents to ED with chief complaint need for Levemir injection.    Patient was discharged earlier today due to mild DKA, hyperglycemia.  His medications were sent to the pharmacy however his pharmacy is closed for the day, they will be open tomorrow morning.  He requests nighttime dose of 20 units of Levemir.  He has no other complaints.  Specifically, denies chest pain, shortness of breath, fatigue, palpitations, nausea vomiting, epigastric pain, polyuria, polydipsia.    Past medical history:   IDDM  Obesity      Review of patient's allergies indicates:  No Known Allergies  No past medical history on file.  No past surgical history on file.  No family history on file.  Social History     Tobacco Use    Smoking status: Never    Smokeless tobacco: Never   Substance Use Topics    Alcohol use: Yes     Comment: occasionally    Drug use: No     Review of Systems   Constitutional:  Negative for appetite change and fever.   Respiratory:  Negative for shortness of breath.    Cardiovascular:  Negative for chest pain and palpitations.   Gastrointestinal:  Negative for abdominal pain, nausea and vomiting.   Endocrine: Negative for polydipsia and polyuria.   Neurological:  Negative for syncope.       Physical Exam     Initial Vitals [04/19/24 2311]   BP Pulse Resp Temp SpO2   130/80 72 16 98.4 °F (36.9 °C) 98 %      MAP       --         Physical Exam    Nursing note and vitals reviewed.  Constitutional: He appears well-developed and well-nourished. He is not diaphoretic. No distress.   HENT:   Head: Normocephalic and atraumatic.   Neck: Neck supple.   Normal range of motion.  Pulmonary/Chest: No respiratory distress.   Musculoskeletal:      Cervical back: Normal range of motion and  neck supple.     Neurological: He is alert and oriented to person, place, and time. GCS score is 15. GCS eye subscore is 4. GCS verbal subscore is 5. GCS motor subscore is 6.   Skin: Skin is warm.   Psychiatric: He has a normal mood and affect. Thought content normal.         ED Course   Procedures  Labs Reviewed   POCT GLUCOSE - Abnormal; Notable for the following components:       Result Value    POCT Glucose 229 (*)     All other components within normal limits          Imaging Results    None          Medications   insulin detemir U-100 (Levemir) pen 20 Units (20 Units Subcutaneous Given 4/20/24 0001)     Medical Decision Making  Differential diagnosis:  Encounter for medication refill, drug reaction, hyperglycemia, DKA    Amount and/or Complexity of Data Reviewed  Discussion of management or test interpretation with external provider(s): Discharged in stable condition following Levemir nighttime dose.     Risk  Prescription drug management.                                      Clinical Impression:  Final diagnoses:  [R73.9] Hyperglycemia (Primary)          ED Disposition Condition    Discharge Stable          ED Prescriptions    None       Follow-up Information       Follow up With Specialties Details Why Contact Info    [facility] - Emergency Dept Emergency Medicine  As needed, If symptoms worsen [address] [facility] [place] [address] [phone]             Davon Gibson PA-C  04/20/24 6664

## 2024-04-20 NOTE — DISCHARGE INSTRUCTIONS
your medications as planned tomorrow morning, begin taking as prescribed.    Return to this ED if you develop weakness, fatigue, racing heartbeat, shortness of breath, chest pain, epigastric pain, nausea vomiting, frequent pain, increased thirst, if unable to control your blood sugar, if any other worrisome symptoms develop related to your blood sugar.

## 2024-04-20 NOTE — TELEPHONE ENCOUNTER
Call[facility] that [facility] would like prescriptions sent to 24 hour pharmacy. Caller advised to contact pharmacy and request transfer. Caller instructed to call back if unable to transfer script.  Pt advised per protocol and verbalized understanding.       Reason for Disposition   [1] Caller has medicine question about med NOT prescribed by PCP AND [2] triager unable to answer question (e.g., compatibility with other med, storage)    Additional Information   Negative: [1] Intentional drug overdose AND [2] suicidal thoughts or ideas   Negative: MORE THAN A DOUBLE DOSE of a prescription or over-the-counter (OTC) drug   Negative: [1] DOUBLE DOSE (an extra dose or lesser amount) of prescription drug AND [2] any symptoms (e.g., dizziness, nausea, pain, sleepiness)   Negative: [1] DOUBLE DOSE (an extra dose or lesser amount) of over-the-counter (OTC) drug AND [2] any symptoms (e.g., dizziness, nausea, pain, sleepiness)   Negative: Took another person's prescription drug   Negative: [1] DOUBLE DOSE (an extra dose or lesser amount) of prescription drug AND [2] NO symptoms  (Exception: A double dose of antibiotics.)   Negative: Diabetes drug error or overdose (e.g., took wrong type of insulin or took extra dose)   Negative: [1] Prescription not at pharmacy AND [2] was prescribed by PCP recently (Exception: Triager has access to EMR and prescription is recorded there. Go to Home Care and confirm for pharmacy.)   Negative: [1] Pharmacy calling with prescription question AND [2] triager unable to answer question   Negative: [1] Caller has URGENT medicine question about med that PCP or specialist prescribed AND [2] triager unable to answer question   Negative: Medicine patch causing local rash or itching    Protocols used: Medication Question Call-A-

## 2024-04-20 NOTE — NURSING
Patient cleared for discharge by  and ARGENTINA Jimenez. AVS/discharge instructions printed, reviewd by VN, and given to patient. Patient verbalizes understanding of instructions. PIV removed, catheter tip intact, dressing placed.  Patient was unable to pay for medications at [facility].  Patient and wife, Mirela both notified that medications are sent to pharmacy as requested at [address] [place] [address].   Patient refused transport. Patient ambulated w/o difficulty off unit with family.

## 2024-04-22 ENCOUNTER — HOSPITAL ENCOUNTER (EMERGENCY)
Facility: HOSPITAL | Age: 34
Discharge: HOME | End: 2024-04-22
Attending: INTERNAL MEDICINE
Payer: COMMERCIAL

## 2024-04-22 VITALS
OXYGEN SATURATION: 95 % | HEART RATE: 68 BPM | HEIGHT: 68 IN | SYSTOLIC BLOOD PRESSURE: 109 MMHG | TEMPERATURE: 98 F | DIASTOLIC BLOOD PRESSURE: 63 MMHG | BODY MASS INDEX: 33.34 KG/M2 | RESPIRATION RATE: 16 BRPM | WEIGHT: 220 LBS

## 2024-04-22 DIAGNOSIS — E11.65 TYPE 2 DIABETES MELLITUS WITH HYPERGLYCEMIA, WITHOUT LONG-TERM CURRENT USE OF INSULIN: ICD-10-CM

## 2024-04-22 DIAGNOSIS — H53.8 BLURRY VISION: Primary | ICD-10-CM

## 2024-04-22 LAB
ALBUMIN SERPL BCP-MCNC: 3.9 G/DL (ref 3.5–5.2)
ALP SERPL-CCNC: 66 U/L (ref 55–135)
ALT SERPL W/O P-5'-P-CCNC: 12 U/L (ref 10–44)
ANION GAP SERPL CALC-SCNC: 12 MMOL/L (ref 8–16)
AST SERPL-CCNC: 15 U/L (ref 10–40)
BASOPHILS # BLD AUTO: 0.04 K/UL (ref 0–0.2)
BASOPHILS NFR BLD: 0.6 % (ref 0–1.9)
BILIRUB SERPL-MCNC: 0.4 MG/DL (ref 0.1–1)
BUN SERPL-MCNC: 16 MG/DL (ref 6–20)
CALCIUM SERPL-MCNC: 9.6 MG/DL (ref 8.7–10.5)
CHLORIDE SERPL-SCNC: 101 MMOL/L (ref 95–110)
CO2 SERPL-SCNC: 23 MMOL/L (ref 23–29)
CREAT SERPL-MCNC: 1.4 MG/DL (ref 0.5–1.4)
DIFFERENTIAL METHOD BLD: ABNORMAL
EOSINOPHIL # BLD AUTO: 0.1 K/UL (ref 0–0.5)
EOSINOPHIL NFR BLD: 1.8 % (ref 0–8)
ERYTHROCYTE [DISTWIDTH] IN BLOOD BY AUTOMATED COUNT: 14.6 % (ref 11.5–14.5)
EST. GFR  (NO RACE VARIABLE): >60 ML/MIN/1.73 M^2
GLUCOSE SERPL-MCNC: 284 MG/DL (ref 70–110)
HCT VFR BLD AUTO: 43.1 % (ref 40–54)
HGB BLD-MCNC: 13.9 G/DL (ref 14–18)
IMM GRANULOCYTES # BLD AUTO: 0.02 K/UL (ref 0–0.04)
IMM GRANULOCYTES NFR BLD AUTO: 0.3 % (ref 0–0.5)
LYMPHOCYTES # BLD AUTO: 3.3 K/UL (ref 1–4.8)
LYMPHOCYTES NFR BLD: 46.2 % (ref 18–48)
MCH RBC QN AUTO: 27.8 PG (ref 27–31)
MCHC RBC AUTO-ENTMCNC: 32.3 G/DL (ref 32–36)
MCV RBC AUTO: 86 FL (ref 82–98)
MONOCYTES # BLD AUTO: 0.5 K/UL (ref 0.3–1)
MONOCYTES NFR BLD: 6.6 % (ref 4–15)
NEUTROPHILS # BLD AUTO: 3.2 K/UL (ref 1.8–7.7)
NEUTROPHILS NFR BLD: 44.5 % (ref 38–73)
NRBC BLD-RTO: 0 /100 WBC
PLATELET # BLD AUTO: 276 K/UL (ref 150–450)
PMV BLD AUTO: 10.9 FL (ref 9.2–12.9)
POCT GLUCOSE: 274 MG/DL (ref 70–110)
POTASSIUM SERPL-SCNC: 4 MMOL/L (ref 3.5–5.1)
PROT SERPL-MCNC: 7.5 G/DL (ref 6–8.4)
RBC # BLD AUTO: 5 M/UL (ref 4.6–6.2)
SODIUM SERPL-SCNC: 136 MMOL/L (ref 136–145)
WBC # BLD AUTO: 7.23 K/UL (ref 3.9–12.7)

## 2024-04-22 PROCEDURE — 99284 EMERGENCY DEPT VISIT MOD MDM: CPT | Mod: 25

## 2024-04-22 PROCEDURE — 85025 COMPLETE CBC W/AUTO DIFF WBC: CPT | Performed by: INTERNAL MEDICINE

## 2024-04-22 PROCEDURE — 25000003 PHARM REV CODE 250: Performed by: INTERNAL MEDICINE

## 2024-04-22 PROCEDURE — 96360 HYDRATION IV INFUSION INIT: CPT

## 2024-04-22 PROCEDURE — 82962 GLUCOSE BLOOD TEST: CPT

## 2024-04-22 PROCEDURE — 80053 COMPREHEN METABOLIC PANEL: CPT | Performed by: INTERNAL MEDICINE

## 2024-04-22 RX ADMIN — SODIUM CHLORIDE 1000 ML: 9 INJECTION, SOLUTION INTRAVENOUS at 03:04

## 2024-04-22 NOTE — ED PROVIDER NOTES
Encounter Date: 4/22/2024    SCRIBE #1 NOTE: I, Marilu Sandoval, am scribing for, and in the presence of,  Sánchez Carmichael MD. I have scribed the following portions of the note - Other sections scribed: HPI, ROS, PE.       History     Chief Complaint   Patient presents with    Blurred Vision     PT with burry vision since waking up this morning. PT stated he has difficulty focusing when reading but, that the vision is not to the point where he can't drive.      Roly Candelaria is a 34 y.o. male with a PHMx of IDDM that presents to the ED for bilateral eye soreness with associated blurry vision that has been occurring for about 2 days. The patient notes that he is able to drive as usual, but has difficulty focusing his eyes when reading at short distances. The patient denies any similar past occurrences. The patient denies fever, headaches, and other symptoms.    Other relevant history includes the patient reports compliance with his daily insulin medication. The patient is unsure when his last eye exam was.    The history is provided by the patient. No  was used.     Review of patient's allergies indicates:  No Known Allergies  Past Medical History:   Diagnosis Date    Diabetes mellitus      No past surgical history on file.  No family history on file.  Social History     Tobacco Use    Smoking status: Never    Smokeless tobacco: Never   Substance Use Topics    Alcohol use: Yes     Comment: occasionally    Drug use: No     Review of Systems   Constitutional:  Negative for fever.   HENT:  Negative for sore throat.    Eyes:  Positive for pain and visual disturbance.   Respiratory:  Negative for shortness of breath.    Cardiovascular:  Negative for chest pain.   Gastrointestinal:  Negative for diarrhea, nausea and vomiting.   Genitourinary:  Negative for dysuria.   Musculoskeletal:  Negative for back pain.   Skin:  Negative for rash.   Neurological:  Negative for weakness and headaches.    Psychiatric/Behavioral:  Negative for behavioral problems.    All other systems reviewed and are negative.      Physical Exam     Initial Vitals [04/22/24 0042]   BP Pulse Resp Temp SpO2   120/74 75 14 98.3 °F (36.8 °C) 99 %      MAP       --         Physical Exam    Nursing note and vitals reviewed.  Constitutional: He appears well-developed and well-nourished.   HENT:   Head: Normocephalic and atraumatic.   Eyes: Conjunctivae and EOM are normal.   Neck:   Normal range of motion.  Cardiovascular:  Normal rate, regular rhythm and normal heart sounds.           Pulmonary/Chest: Breath sounds normal. No respiratory distress. He has no wheezes.   Abdominal: Abdomen is soft. Bowel sounds are normal. He exhibits no distension.   Musculoskeletal:         General: Normal range of motion.      Cervical back: Normal range of motion.     Neurological: He is alert.   Skin: Skin is warm and dry.   Psychiatric: He has a normal mood and affect.         ED Course   Procedures  Labs Reviewed   CBC W/ AUTO DIFFERENTIAL - Abnormal; Notable for the following components:       Result Value    Hemoglobin 13.9 (*)     RDW 14.6 (*)     All other components within normal limits   COMPREHENSIVE METABOLIC PANEL - Abnormal; Notable for the following components:    Glucose 284 (*)     All other components within normal limits   POCT GLUCOSE - Abnormal; Notable for the following components:    POCT Glucose 274 (*)     All other components within normal limits          Imaging Results    None          Medications   sodium chloride 0.9% bolus 1,000 mL 1,000 mL (1,000 mLs Intravenous New Bag 4/22/24 0326)     Medical Decision Making  Roly Candelaria is a 34 y.o. male with a PHMx of IDDM that presents to the ED for bilateral eye soreness with associated blurry vision that has been occurring for about 2 days. The patient notes that he is able to drive as usual, but has difficulty focusing his eyes when reading at short distances. The patient  denies any similar past occurrences. The patient denies fever, headaches, and other symptoms.    Other relevant history includes the patient reports compliance with his daily insulin medication. The patient is unsure when his last eye exam was.  Course of ED stay:  CBC was reassuring.  CMP reassuring except for elevated glucose (274).  Patient had 20/20 vision in both eyes today.  However, he states he has not had an eye exam since being diagnosed with diabetes.  Referral to Ophthalmology Clinic was given as well as diabetic education.  He was advised to follow with ophthalmology as scheduled with his primary care physician within the next week for re-evaluation/return to the emergency department if condition worsens.    Amount and/or Complexity of Data Reviewed  Labs: ordered.            Scribe Attestation:   Scribe #1: I performed the above scribed service and the documentation accurately describes the services I performed. I attest to the accuracy of the note.                         This document was produced by a scribe under my direction and in my presence. I agree with the content of the note and have made any necessary edits.     Dr. Carmichael    04/22/2024 3:58 AM        Clinical Impression:  Final diagnoses:  [H53.8] Blurry vision (Primary)  [E11.65] Type 2 diabetes mellitus with hyperglycemia, without long-term current use of insulin          ED Disposition Condition    Discharge Stable          ED Prescriptions    None       Follow-up Information    None          Sánchez Carmichael MD  04/22/24 0404

## 2024-04-22 NOTE — ED TRIAGE NOTES
Roly Candelaria, a 34 y.o. male presents to the ED w/ complaint of blurry vision and soreness to bilateral eyes since yesterday morning. Pt reports he has difficulty focusing more up close than farther away when it comes to reading. Pt states he never experienced this before. Pt denies any other complaints. No neurological deficits noted. Pt is AOOX4, VSS, and NADN.      Triage note:  Chief Complaint   Patient presents with    Blurred Vision     PT with burry vision since waking up this morning. PT stated he has difficulty focusing when reading but, that the vision is not to the point where he can't drive.      Review of patient's allergies indicates:  No Known Allergies  Past Medical History:   Diagnosis Date    Diabetes mellitus

## 2024-04-22 NOTE — DISCHARGE INSTRUCTIONS
Follow-up with ophthalmology as scheduled and with your primary care physician within the next week for re-evaluation.

## 2024-04-29 ENCOUNTER — OFFICE VISIT (OUTPATIENT)
Dept: FAMILY MEDICINE | Facility: CLINIC | Age: 34
End: 2024-04-29
Payer: COMMERCIAL

## 2024-04-29 VITALS
HEART RATE: 69 BPM | WEIGHT: 224.19 LBS | DIASTOLIC BLOOD PRESSURE: 78 MMHG | HEIGHT: 68 IN | OXYGEN SATURATION: 99 % | BODY MASS INDEX: 33.98 KG/M2 | SYSTOLIC BLOOD PRESSURE: 116 MMHG | TEMPERATURE: 98 F

## 2024-04-29 DIAGNOSIS — E11.65 UNCONTROLLED TYPE 2 DIABETES MELLITUS WITH HYPERGLYCEMIA: Primary | ICD-10-CM

## 2024-04-29 DIAGNOSIS — Z00.00 HEALTHCARE MAINTENANCE: ICD-10-CM

## 2024-04-29 PROCEDURE — 3008F BODY MASS INDEX DOCD: CPT | Mod: CPTII,S$GLB,, | Performed by: INTERNAL MEDICINE

## 2024-04-29 PROCEDURE — 3074F SYST BP LT 130 MM HG: CPT | Mod: CPTII,S$GLB,, | Performed by: INTERNAL MEDICINE

## 2024-04-29 PROCEDURE — 3061F NEG MICROALBUMINURIA REV: CPT | Mod: CPTII,S$GLB,, | Performed by: INTERNAL MEDICINE

## 2024-04-29 PROCEDURE — 99999 PR PBB SHADOW E&M-EST. PATIENT-LVL IV: CPT | Mod: PBBFAC,,, | Performed by: INTERNAL MEDICINE

## 2024-04-29 PROCEDURE — 90677 PCV20 VACCINE IM: CPT | Mod: S$GLB,,, | Performed by: INTERNAL MEDICINE

## 2024-04-29 PROCEDURE — 1160F RVW MEDS BY RX/DR IN RCRD: CPT | Mod: CPTII,S$GLB,, | Performed by: INTERNAL MEDICINE

## 2024-04-29 PROCEDURE — 3066F NEPHROPATHY DOC TX: CPT | Mod: CPTII,S$GLB,, | Performed by: INTERNAL MEDICINE

## 2024-04-29 PROCEDURE — 90471 IMMUNIZATION ADMIN: CPT | Mod: S$GLB,,, | Performed by: INTERNAL MEDICINE

## 2024-04-29 PROCEDURE — 99214 OFFICE O/P EST MOD 30 MIN: CPT | Mod: 25,S$GLB,, | Performed by: INTERNAL MEDICINE

## 2024-04-29 PROCEDURE — 1159F MED LIST DOCD IN RCRD: CPT | Mod: CPTII,S$GLB,, | Performed by: INTERNAL MEDICINE

## 2024-04-29 PROCEDURE — 3046F HEMOGLOBIN A1C LEVEL >9.0%: CPT | Mod: CPTII,S$GLB,, | Performed by: INTERNAL MEDICINE

## 2024-04-29 PROCEDURE — 3078F DIAST BP <80 MM HG: CPT | Mod: CPTII,S$GLB,, | Performed by: INTERNAL MEDICINE

## 2024-04-29 PROCEDURE — 1111F DSCHRG MED/CURRENT MED MERGE: CPT | Mod: CPTII,S$GLB,, | Performed by: INTERNAL MEDICINE

## 2024-04-29 RX ORDER — LANCETS
EACH MISCELLANEOUS
Qty: 200 EACH | Refills: 5 | Status: CANCELLED | OUTPATIENT
Start: 2024-04-29

## 2024-04-29 RX ORDER — FLASH GLUCOSE SENSOR
KIT MISCELLANEOUS
Qty: 2 KIT | Refills: 11 | Status: SHIPPED | OUTPATIENT
Start: 2024-04-29 | End: 2024-06-05

## 2024-04-29 RX ORDER — INSULIN PUMP SYRINGE, 3 ML
EACH MISCELLANEOUS
Qty: 1 EACH | Refills: 0 | Status: CANCELLED | OUTPATIENT
Start: 2024-04-29 | End: 2025-04-29

## 2024-04-29 RX ORDER — INSULIN GLARGINE 100 [IU]/ML
22 INJECTION, SOLUTION SUBCUTANEOUS DAILY
Qty: 15 ML | Refills: 3 | Status: SHIPPED | OUTPATIENT
Start: 2024-04-29 | End: 2024-06-05

## 2024-04-29 NOTE — PROGRESS NOTES
IM pcv 20 vaccine injection administered as ordered. Patient tolerated well without difficulty. Vis provided to patient.

## 2024-04-29 NOTE — PATIENT INSTRUCTIONS
If mealtime glucose:    150-200, add 2 units of aspart insulin  201-250, add 4 units of aspart insulin  251-300, add 6 units of aspart insulin  301-350, add 8 units of aspart insulin  Above 351, add 10 units of aspart insulin and call MD    Monitor carbohydrates at meals and snacks.  Fiber is okay but avoid a lot of starch and sugar.  Limit meal time carb intake to 30 gm per meal (not including fiber) and snack time carb intake to 15 gm (not including fiber).

## 2024-04-29 NOTE — PROGRESS NOTES
HISTORY OF PRESENT ILLNESS:  Roly Candelaria is a 34 y.o. male who presents to the clinic today for Hospital Follow Up    Admitted 4/14/24-4/19/24.    Seen in ED 4/19/24 due to inability to  insulin on day of D/C due to pharmacy being closed.  Administered insuling detemir in ED.    Seen in ED 4/22/24 for blurry vision - reported as stable today.  Feels near vision is affected but not far vision. Referred to ophtho.    Scheduled with endo 6/19/24.  Optometry appt 5/14/24.    On Lantus 20 u daily with Aspart 6 u plus sliding scale tid.    HOME [facility] LOG:                          Hemoglobin A1C   Date Value Ref Range Status   04/15/2024 >14.0 (H) 4.0 - 5.6 % Final     Comment:     ADA Screening Guidelines:  5.7-6.4%  Consistent with prediabetes  >or=6.5%  Consistent with diabetes    High levels of fetal hemoglobin interfere with the HbA1C  assay. Heterozygous hemoglobin variants (HbS, HgC, etc)do  not significantly interfere with this assay.   However, presence of multiple variants may affect accuracy.     04/14/2024 >14.0 (H) 4.0 - 5.6 % Final     Comment:     ADA Screening Guidelines:  5.7-6.4%  Consistent with prediabetes  >or=6.5%  Consistent with diabetes    High levels of fetal hemoglobin interfere with the HbA1C  assay. Heterozygous hemoglobin variants (HbS, HgC, etc)do  not significantly interfere with this assay.   However, presence of multiple variants may affect accuracy.         D/C SUMMARY:    HPI:   34-year-old  male with medical history significant for type 2 diabetes mellitus-diagnosed January of 2023, and class 1 obesity who presented to the emergency department to get checked out for polyuria, polydipsia and fatigue. He was diagnosed with type 2 diabetes mellitus about 15 months ago but decided on lifestyle modification for his treatment, he voiced losing about 15-20 lb, with improved diet and physical activity but symptoms of polyuria, polydipsia and generalized fatigue were  noticed about 3 weeks ago, worsening hence he presented to the emergency room today.  He denied symptoms of dysuria, urgency, straining at micturition or hematuria.  He has not had any fever, chills or rigors.  Denied nausea, no change in bowel habits, no vomiting, diarrhea or constipation.  He has not had any cough, no chest pain, shortness for breath, no orthopnea, no paroxysmal nocturnal dyspnea or pedal swelling.  He denied cigarette smoking but occasionally drinks alcohol. He denied heat or cold intolerance, No past surgical history.  Lab obtained at presentation showed point of care glucose greater than 500, urinalysis showed colorless urine, 4+ glucose, 2+ of ketones.  Beta hydroxybutyrate 4.9, H&H of 14.2/44.2, BUN/creatinine 17/2.1.  Serum bicarbonate of 16.  Chest x-ray showed no acute cardiopulmonary process.     * No surgery found *       Hospital Course:   Admission to observation for uncontrolled diabetes/mild DKA. HgbA1c >14. Continue to adjust basal and meal time insulin.    .....    Renal/  ANA (acute kidney injury)  Patient with acute kidney injury/acute renal failure likely due to pre-renal azotemia due to IVVD in the setting of osmotic diuresis.  ANA is currently stable. Baseline creatinine  1 years we will every day was pain  - Labs reviewed- Renal function/electrolytes with Estimated Creatinine Clearance: 96.9 mL/min (based on SCr of 1.2 mg/dL). according to latest data.   Monitor urine output and serial BMP and adjust therapy as needed.   Avoid nephrotoxins and renally dose meds for GFR listed above.  Resolved     Endocrine  * Type 2 diabetes mellitus with hyperglycemia        Lab Results   Component Value Date     HGBA1C >14.0 (H) 04/15/2024          Recent Labs   Lab 04/19/24  0656   POCTGLUCOSE 180*         -Patient's FSGs are uncontrolled due to hyperglycemia on current medication regimen.  -mild DKA  -He was diagnosed with type 2 diabetes mellitus 1 year ago  -He tried lifestyle  "modification but returned to the emergency room with polyuria, polydipsia, fatigue with point of care glucose greater than 500  -Glucose on cmp was 767 mg/dL  -Anion gap was normal at presentation although he was acidotic, AG >12  -Serum bicarbonate was low at 16, with 2+ urine ketones and elevated beta hydroxybutyrate.  -HgbA1c >14  Cont current basal/prandial insulin regimen   Low dose correction scale   Cont blood glucose monitoring   BG goal:  Preprandial blood glucose target <140 mg/dL  Random glucoses <180 mg/dL  ADA diet     BS at goal. Stable for discharge  Ambulatory referral to endocrinology.      Antihyperglycemics (From admission, onward)        Start     Stop Route Frequency Ordered     04/18/24 2100   insulin detemir U-100 (Levemir) pen 20 Units         -- SubQ Daily 04/18/24 0909     04/18/24 1130   insulin aspart U-100 pen 6 Units         -- SubQ 3 times daily with meals 04/18/24 0909     04/15/24 0121   insulin aspart U-100 pen 0-10 Units         -- SubQ Before meals & nightly PRN 04/15/24 0027                   Hyponatremia  Patient has hyponatremia which is controlled,We will aim to correct the sodium by 4-6mEq in 24 hours. We will monitor sodium Every 12 hours. The hyponatremia is due to Dehydration/hypovolemia and hyperglycemia. We will obtain the following studies: repeat BMP with hyperglycemia is correction. We will treat the hyponatremia with IV fluids as follows: 100 ml/hr LR. The patient's sodium results have been reviewed and are listed below.  No results for input(s): "NA" in the last 24 hours.  resolved     Obesity  Body mass index is 33.05 kg/m².   Morbid obesity complicates all aspects of disease management from diagnostic modalities to treatment.   Weight loss encouraged and health benefits explained to patient.                        PAST MEDICAL HISTORY:  Past Medical History:   Diagnosis Date    Diabetes mellitus        PAST SURGICAL HISTORY:  No past surgical history on " file.    SOCIAL HISTORY:  Social History     Socioeconomic History    Marital status: Single   Tobacco Use    Smoking status: Never    Smokeless tobacco: Never   Substance and Sexual Activity    Alcohol use: Yes     Comment: occasionally    Drug use: No    Sexual activity: Not Currently     Social Determinants of Health     Financial Resource Strain: Medium Risk (4/22/2024)    Overall Financial Resource Strain (CARDIA)     Difficulty of Paying Living Expenses: Somewhat hard   Food Insecurity: Food Insecurity Present (4/22/2024)    Hunger Vital Sign     Worried About Running Out of Food in the Last Year: Sometimes true     Ran Out of Food in the Last Year: Never true   Transportation Needs: No Transportation Needs (4/22/2024)    PRAPARE - Transportation     Lack of Transportation (Medical): No     Lack of Transportation (Non-Medical): No   Physical Activity: Insufficiently Active (4/22/2024)    Exercise Vital Sign     Days of Exercise per Week: 2 days     Minutes of Exercise per Session: 30 min   Stress: Stress Concern Present (4/22/2024)    [employer] [place] [employer] - Occupational Stress Questionnaire     Feeling of Stress : To some extent   Social Connections: Unknown (4/22/2024)    Social Connection and Isolation Panel [NHANES]     Frequency of Communication with Friends and Family: Once a week     Frequency of Social Gatherings with Friends and Family: Patient declined     Attends Orthodox Services: Never     Active Member of Clubs or Organizations: No     Attends Club or Organization Meetings: Never     Marital Status: Living with partner   Recent Concern: Social Connections - Moderately Isolated (4/18/2024)    Social Connection and Isolation Panel [NHANES]     Frequency of Communication with Friends and Family: More than three times a week     Frequency of Social Gatherings with Friends and Family: More than three times a week     Attends Orthodox Services: Never     Active Member of Clubs or  "Organizations: No     Attends Club or Organization Meetings: Never     Marital Status:    Housing Stability: High Risk (4/22/2024)    Housing Stability Vital Sign     Unable to Pay for Housing in the Last Year: Yes     Number of Times Moved in the Last Year: 1     Homeless in the Last Year: No       FAMILY HISTORY:  Family History   Problem Relation Name Age of Onset    Diabetes Mother Brayden        ALLERGIES AND MEDICATIONS: updated and reviewed.  Review of patient's allergies indicates:  No Known Allergies  Medication List with Changes/Refills   Current Medications    INSULIN ASPART U-100 (NOVOLOG) 100 UNIT/ML (3 ML) INPN PEN    Inject 6 Units into the skin 3 (three) times daily with meals. Inject 0-10 Units into the skin before meals and at bedtime as needed (Hyperglycemia).    INSULIN GLARGINE 100 UNITS/ML SUBQ PEN    Inject 20 Units into the skin once daily.    PEN NEEDLE, DIABETIC 32 GAUGE X 5/32" NDLE    USE TO INJECT INSULIN FOUR TIMES DAILY AS DIRECTED          CARE TEAM:  Patient Care Team:  No, Primary Doctor as PCP - General         REVIEW OF SYSTEMS:  Review of Systems   Constitutional:  Negative for chills and fever.   HENT:  Negative for congestion and postnasal drip.    Eyes:  Positive for visual disturbance. Negative for pain and redness.   Respiratory:  Negative for cough and shortness of breath.    Cardiovascular:  Negative for chest pain and palpitations.   Gastrointestinal:  Negative for nausea and vomiting.   Genitourinary:  Negative for dysuria and frequency.   Musculoskeletal:  Negative for arthralgias and back pain.   Neurological:  Negative for light-headedness and headaches.   Psychiatric/Behavioral:  Negative for dysphoric mood. The patient is not nervous/anxious.          PHYSICAL EXAM:  Vitals:    04/29/24 1045   BP: 116/78   Pulse: 69   Temp: 98.4 °F (36.9 °C)             Body mass index is 34.09 kg/m².    Physical Examination: General appearance - alert, well appearing, and in " no distress  Mental status - normal mood, behavior, speech, dress, motor activity, and thought processes  Eyes - sclera anicteric, left eye normal, right eye normal  Chest - no tachypnea, retractions or cyanosis  Neurological - alert, oriented, normal speech, no focal findings or movement disorder noted  Skin - normal coloration and turgor, no rashes, no suspicious skin lesions noted       ASSESSMENT AND PLAN:  Uncontrolled type 2 diabetes mellitus with hyperglycemia  -     Hemoglobin A1C; Future; Expected date: 07/30/2024  -     Comprehensive Metabolic Panel; Future; Expected date: 07/30/2024  -     HIV 1/2 Ag/Ab (4th Gen); Future; Expected date: 04/29/2024  -     Ambulatory referral/consult to Diabetes Education; Future; Expected date: 05/06/2024  -     flash glucose sensor (FREESTYLE SERA 2 SENSOR) Kit; Check glucose tid before meals and before bedtime.  Dispense: 2 kit; Refill: 11  -     insulin glargine 100 units/mL SubQ pen; Inject 22 Units into the skin once daily.  Dispense: 15 mL; Refill: 3  -     Lipid Panel; Future; Expected date: 07/30/2024  - Patient was educated regarding his diagnosis today.  - Educated regarding diet and exercise.    Healthcare maintenance  -     Hemoglobin A1C; Future; Expected date: 07/30/2024  -     Cancel: Lipid Panel; Future; Expected date: 07/30/2024  -     Comprehensive Metabolic Panel; Future; Expected date: 07/30/2024  -     HIV 1/2 Ag/Ab (4th Gen); Future; Expected date: 04/29/2024  -     pneumoc 20-romy conj-dip cr(PF) (PREVNAR-20 (PF)) injection Syrg 0.5 mL  -     Ambulatory referral/consult to Diabetes Education; Future; Expected date: 05/06/2024  -     Hepatitis C Antibody; Future; Expected date: 04/29/2024  -     Lipid Panel; Future; Expected date: 07/30/2024              Follow up 3 months or sooner as needed.

## 2024-05-02 ENCOUNTER — CLINICAL SUPPORT (OUTPATIENT)
Dept: DIABETES | Facility: CLINIC | Age: 34
End: 2024-05-02
Payer: COMMERCIAL

## 2024-05-02 VITALS — WEIGHT: 222.81 LBS | HEIGHT: 68 IN | BODY MASS INDEX: 33.77 KG/M2

## 2024-05-02 DIAGNOSIS — E11.65 UNCONTROLLED TYPE 2 DIABETES MELLITUS WITH HYPERGLYCEMIA: ICD-10-CM

## 2024-05-02 DIAGNOSIS — Z00.00 HEALTHCARE MAINTENANCE: ICD-10-CM

## 2024-05-02 PROCEDURE — 99999 PR PBB SHADOW E&M-EST. PATIENT-LVL II: CPT | Mod: PBBFAC,,,

## 2024-05-02 PROCEDURE — G0108 DIAB MANAGE TRN  PER INDIV: HCPCS | Mod: S$GLB,,, | Performed by: FAMILY MEDICINE

## 2024-05-02 NOTE — PROGRESS NOTES
"Diabetes Care Specialist Progress Note  Author: Karoline Schwarz RN  Date: 5/2/2024    Program Intake  Reason for Diabetes Program Visit:: Initial Diabetes Assessment  Current diabetes risk level:: high  In the last 12 months, have you:: used emergency room services  Was the ER or hospital admission related to diabetes?: Yes  Permission to speak with others about care:: no  Continuous Glucose Monitoring  Patient has CGM: No    Lab Results   Component Value Date    HGBA1C >14.0 (H) 04/15/2024       Clinical  Weight: 101.1 kg (222 lb 12.8 oz)   Height: 5' 8" (172.7 cm)   Body mass index is 33.88 kg/m².    Clinical Assessment  Current Diabetes Treatment: Insulin (Levemir 22U daily and Aspart 6U + SSI TID w/meals)  Have you ever experienced hypoglycemia (low blood sugar)?: no  Have you ever experienced hyperglycemia (high blood sugar)?: no    Medication Information  How do you obtain your medications?: Patient drives  How many days a week do you miss your medications?: Never  Do you sometimes have difficulty refilling your medications?: No  Medication adherence impacting ability to self-manage diabetes?: No    Labs  Do you have regular lab work to monitor your medications?: Yes  Type of Regular Lab Work: A1c  Where do you get your labs drawn?: [facility]  Lab Compliance Barriers: No    Nutritional Status  Diet: Regular  Meal Plan 24 Hour Recall: Breakfast, Lunch, Dinner, Snack  Meal Plan 24 Hour Recall - Breakfast: 2 boiled eggs, 3-4 breakfast sausages, yogurt w/raspberries + water  Meal Plan 24 Hour Recall - Lunch: salad w/chicken + 3 burger patties w/lettuce & tomatoes + crabmeat w/avacodo and water  Meal Plan 24 Hour Recall - Dinner: chicken and broccoli w/water or (3) shrimp tortilla w/beans and gaucamole  Meal Plan 24 Hour Recall - Snack: nuts, or fruits, or boiled eggs  Change in appetite?: Yes  Recent Changes in Weight: No Recent Weight Change  Current nutritional status an area of need that is impacting patient's " ability to self-manage diabetes?: No    Additional Social History  Support  Does anyone support you with your diabetes care?: yes  Who supports you?: spouse  Who takes you to your medical appointments?: self  Does the current support meet the patient's needs?: Yes  Is Support an area impacting ability to self-manage diabetes?: No    Access to Mass Media & Technology  Does the patient have access to any of the following devices or technologies?: Smart phone  Media or technology needs impacting ability to self-manage diabetes?: No    Cognitive/Behavioral Health  Alert and Oriented: Yes  Difficulty Thinking: No  Requires Prompting: No  Requires assistance for routine expression?: No  Cognitive or behavioral barriers impacting ability to self-manage diabetes?: No    Culture/Druze  Culture or Nondenominational beliefs that may impact ability to access healthcare: No    Communication  Language preference: English  Hearing Problems: No  Vision Problems: No  Communication needs impacting ability to self-manage diabetes?: No    Health Literacy  Preferred Learning Method: Face to Face, Reading Materials  How often do you need to have someone help you read instructions, pamphlets, or written material from your doctor or pharmacy?: Never  Health literacy needs impacting ability to self-manage diabetes?: No      Diabetes Self-Management Skills Assessment  Diabetes Disease Process/Treatment Options  Patient/caregiver able to state what happens when someone has diabetes.: no  Patient/caregiver knows what type of diabetes they have.: yes  Diabetes Type : Type II  Patient/caregiver able to identify at least three signs and symptoms of diabetes.: yes  Identified signs and symptoms:: frequent urination, fatigue  Patient able to identify at least three risk factors for diabetes.: no  Diabetes Disease Process/Treatment Options: Skills Assessment Completed: Yes  Assessment indicates:: Instruction Needed, Knowledge deficit  Area of need?:  Yes    Nutrition/Healthy Eating  Challenges to healthy eating:: portion control  Method of carbohydrate measurement:: no method  Patient can identify foods that impact blood sugar.: yes  Patient-identified foods:: starches (bread, pasta, rice, cereal), sweets  Nutrition/Healthy Eating Skills Assessment Completed:: Yes  Assessment indicates:: Instruction Needed, Knowledge deficit  Area of need?: Yes    Physical Activity/Exercise  Patient's daily activity level:: lightly active  Patient formally exercises outside of work.: yes  Exercise Type: walking  Intensity: Low  Frequency: three times a week  Patient can identify forms of physical activity.: yes  Stated forms of physical activity:: any movement performed by muscles that uses energy  Patient can identify reasons why exercise/physical activity is important in diabetes management.: no  Physical Activity/Exercise Skills Assessment Completed: : Yes  Assessment indicates:: Instruction Needed, Knowledge deficit  Area of need?: Yes    Medications  Patient is able to describe current diabetes management routine.: yes  Diabetes management routine:: diet, insulin  Patient is able to identify current diabetes medications, dosages, and appropriate timing of medications.: yes  Patient understands the purpose of the medications taken for diabetes.: no  Patient reports problems or concerns with current medication regimen.: no  Medication Skills Assessment Completed:: Yes  Assessment indicates:: Instruction Needed, Knowledge deficit  Area of need?: Yes    Home Blood Glucose Monitoring  Patient states that blood sugar is checked at home daily.: yes  Monitoring Method:: home glucometer  Home glucometer meter type:: Insurance Preferred Meter  How often do you check your blood sugar?: 4 times a day  When do you check your blood sugar?: Before breakfast, Before lunch, Before dinner  When you check what is your typical blood sugar range? : 200's- 300's  Blood glucose logs:: no,  encouraged to keep logs, encouraged to bring logs to provider visits  Blood glucose logs reviewed today?: no  Home Blood Glucose Monitoring Skills Assessment Completed: : Yes  Assessment indicates:: Instruction Needed, Knowledge deficit  Area of need?: Yes    Acute Complications  Patient is able to identify types of acute complications: No  Acute Complications Skills Assessment Completed: : Yes  Assessment indicates:: Instruction Needed, Knowledge deficit  Area of need?: Yes    Chronic Complications  Chronic Complications Skills Assessment Completed: : No  Deferred due to:: Time    Psychosocial/Coping  Psychosocial/Coping Skills Assessment Completed: : No  Deffered due to:: Time      Assessment Summary and Plan    Based on today's diabetes care assessment, the following areas of need were identified:          5/2/2024    12:01 AM   Social   Support No   Access to Mass Media/Tech No   Cognitive/Behavioral Health No   Culture/Sikh No   Communication No   Health Literacy No            5/2/2024    12:01 AM   Clinical   Medication Adherence No   Lab Compliance No   Nutritional Status No            5/2/2024    12:01 AM   Diabetes Self-Management Skills   Diabetes Disease Process/Treatment Options Yes- Provided DM management guide and discussed risk factors of diabetes. Also, instructed patient on what is diabetes and the progression of the disease. Discussed significance of current A1c and blood glucose goals.   Nutrition/Healthy Eating Yes- see care planning   Physical Activity/Exercise Yes- Pt to continue exercising and increase as tolerated. Discussed benefits of exercise as it relates to insulin resistance and weight loss.      Medication Yes- Reviewed patient's current medication/insulin regimen including SSI regimen.   MOA reviewed including common side effects.  Clarified proper insulin dose and timing in relation to meals, etc.   Home Blood Glucose Monitoring Yes- Discussed BS goals and importance of  monitoring and recording BS for review and maintenance of medication. Patient encourage to document glucose results and bring them to every clinic visit.   Acute Complications Yes- Discussed blood sugar goals, prevention, detection, signs and symptoms, and treatment of hypoglycemia following rule of 15.           Today's interventions were provided through individual discussion, instruction, and written materials were provided.      Patient verbalized understanding of instruction and written materials.  Pt was able to return back demonstration of instructions today. Patient understood key points, needs reinforcement and further instruction.     Diabetes Self-Management Care Plan:    Today's Diabetes Self-Management Care Plan was developed with Roly's input. Roly has agreed to work toward the following goal(s) to improve his/her overall diabetes control.      Care Plan: Diabetes Management   Updates made since 4/2/2024 12:00 AM        Problem: Healthy Eating         Goal: Eat 2-3 meals daily with 30-45g/2-3 servings of Carbohydrate per meal. Limit snacking in between meal to 1 serving (15 grams).    Start Date: 5/2/2024   Expected End Date: 5/15/2024   Priority: High   Barriers: No Barriers Identified   Note:    5/2/24 - - We reviewed eating right with diabetes. We discussed importance of eating balanced meals with vegetables, fruits, lean meats, and whole grains. We concentrated on portion sizes at today's session. Overall meal planning and various choices for meals. Discussed carb vs non-carb foods, appropriate amount of carbs to have at meals/snacks and acceptable serving sizes of individual carb items. Obtained a 24-hr food recall from patient. Discussed various meal plan options to promote healthy eating.    - - Practiced reading food labels on various food items with patient focusing on serving size and total carbohydrate intake (not sugar intake). Instructed on appropriate serving sizes of specific carb  containing foods. Stressed importance of eating a protein at each meal and include non-starchy vegetables at lunch/dinner. Instructed pt to aim for 2-3 evenly spaced meals or a small carb snack in place of a missed meal. Written education information provided to patient for use at home. Pt verbalized understanding of all the above.         Task: Reviewed the sources and role of Carbohydrate, Protein, and Fat and how each nutrient impacts blood sugar. Completed 5/2/2024        Task: Provided visual examples using dry measuring cups, food models, and other familiar objects such as computer mouse, deck or cards, tennis ball etc. to help with visualization of portions. Completed 5/2/2024        Task: Explained how to count carbohydrates using the food label and the use of dry measuring cups for accurate carb counting. Completed 5/2/2024     Task: Recommended replacing beverages containing high sugar content with noncaloric/sugar free options and/or water. Completed 5/2/2024        Task: Review the importance of balancing carbohydrates with each meal using portion control techniques to count servings of carbohydrate and label reading to identify serving size and amount of total carbs per serving. Completed 5/2/2024        Task: Provided Sample plate method and reviewed the use of the plate to estimate amounts of carbohydrate per meal. Completed 5/2/2024          Follow Up Plan     Follow up in about 13 days (around 5/15/2024) for F/U #1 review BS log, meal planning, and insulin regimen.    Today's care plan and follow up schedule was discussed with patient.  Roly verbalized understanding of the care plan, goals, and agrees to follow up plan.        The patient was encouraged to communicate with his/her health care provider/physician and care team regarding his/her condition(s) and treatment.  I provided the patient with my contact information today and encouraged to contact me via phone or [facility] Patient Portal as  needed.     Length of Visit   Total Time: 60 Minutes

## 2024-05-07 DIAGNOSIS — E11.65 TYPE 2 DIABETES MELLITUS WITH HYPERGLYCEMIA, WITHOUT LONG-TERM CURRENT USE OF INSULIN: Primary | ICD-10-CM

## 2024-05-07 DIAGNOSIS — E11.9 TYPE 2 DIABETES MELLITUS TREATED WITH INSULIN: ICD-10-CM

## 2024-05-07 DIAGNOSIS — Z79.4 TYPE 2 DIABETES MELLITUS TREATED WITH INSULIN: ICD-10-CM

## 2024-05-07 DIAGNOSIS — E11.65 UNCONTROLLED TYPE 2 DIABETES MELLITUS WITH HYPERGLYCEMIA: ICD-10-CM

## 2024-05-07 DIAGNOSIS — E11.65 TYPE 2 DIABETES MELLITUS WITH HYPERGLYCEMIA, WITHOUT LONG-TERM CURRENT USE OF INSULIN: ICD-10-CM

## 2024-05-07 RX ORDER — BLOOD-GLUCOSE SENSOR
EACH MISCELLANEOUS
Qty: 4 EACH | Refills: 5 | Status: SHIPPED | OUTPATIENT
Start: 2024-05-07

## 2024-05-07 RX ORDER — BLOOD-GLUCOSE SENSOR
EACH MISCELLANEOUS
Qty: 4 EACH | Refills: 5 | Status: SHIPPED | OUTPATIENT
Start: 2024-05-07 | End: 2024-05-07 | Stop reason: SDUPTHER

## 2024-05-14 ENCOUNTER — OFFICE VISIT (OUTPATIENT)
Dept: OPTOMETRY | Facility: CLINIC | Age: 34
End: 2024-05-14
Payer: COMMERCIAL

## 2024-05-14 DIAGNOSIS — H53.8 BLURRY VISION: ICD-10-CM

## 2024-05-14 DIAGNOSIS — E11.9 TYPE 2 DIABETES MELLITUS WITHOUT RETINOPATHY: Primary | ICD-10-CM

## 2024-05-14 DIAGNOSIS — E11.65 TYPE 2 DIABETES MELLITUS WITH HYPERGLYCEMIA, WITHOUT LONG-TERM CURRENT USE OF INSULIN: ICD-10-CM

## 2024-05-14 PROCEDURE — 99999 PR PBB SHADOW E&M-EST. PATIENT-LVL III: CPT | Mod: PBBFAC,,, | Performed by: OPTOMETRIST

## 2024-05-14 PROCEDURE — 92004 COMPRE OPH EXAM NEW PT 1/>: CPT | Mod: S$GLB,,, | Performed by: OPTOMETRIST

## 2024-05-14 NOTE — PROGRESS NOTES
HPI    Pt presents today to establish diabetic eye care. Pt reports no vision   complaints normally, only when sugar elevates he experience blurry vision.   Pt denies eye pain, floaters and flashes. Not using any eye meds.     Hemoglobin A1C       Date                     Value               Ref Range             Status                04/15/2024               >14.0 (H)           4.0 - 5.6 %           Final                   04/14/2024               >14.0 (H)           4.0 - 5.6 %           Final                Last edited by Yajaira Chisholm MA on 5/14/2024 12:52 PM.            Assessment /Plan     For exam results, see Encounter Report.    Type 2 diabetes mellitus without retinopathy    Blurry vision  -     Ambulatory referral/consult to Ophthalmology    Type 2 diabetes mellitus with hyperglycemia, without long-term current use of insulin  -     Ambulatory referral/consult to Ophthalmology      1,2,3. No diabetic retinopathy, no csme. Some blur with high sugar levels, better recently, Return in 1 year for dilated eye exam.

## 2024-05-15 ENCOUNTER — LAB VISIT (OUTPATIENT)
Dept: LAB | Facility: HOSPITAL | Age: 34
End: 2024-05-15
Attending: INTERNAL MEDICINE
Payer: COMMERCIAL

## 2024-05-15 ENCOUNTER — OFFICE VISIT (OUTPATIENT)
Dept: FAMILY MEDICINE | Facility: CLINIC | Age: 34
End: 2024-05-15
Payer: COMMERCIAL

## 2024-05-15 ENCOUNTER — CLINICAL SUPPORT (OUTPATIENT)
Dept: DIABETES | Facility: CLINIC | Age: 34
End: 2024-05-15
Payer: COMMERCIAL

## 2024-05-15 VITALS
HEIGHT: 68 IN | OXYGEN SATURATION: 97 % | DIASTOLIC BLOOD PRESSURE: 74 MMHG | TEMPERATURE: 98 F | HEART RATE: 80 BPM | SYSTOLIC BLOOD PRESSURE: 100 MMHG | WEIGHT: 221.13 LBS | BODY MASS INDEX: 33.51 KG/M2

## 2024-05-15 DIAGNOSIS — E11.65 UNCONTROLLED TYPE 2 DIABETES MELLITUS WITH HYPERGLYCEMIA: Primary | ICD-10-CM

## 2024-05-15 DIAGNOSIS — E11.65 TYPE 2 DIABETES MELLITUS WITH HYPERGLYCEMIA, WITH LONG-TERM CURRENT USE OF INSULIN: Primary | ICD-10-CM

## 2024-05-15 DIAGNOSIS — Z11.59 NEED FOR HEPATITIS C SCREENING TEST: ICD-10-CM

## 2024-05-15 DIAGNOSIS — Z79.4 TYPE 2 DIABETES MELLITUS WITH HYPERGLYCEMIA, WITH LONG-TERM CURRENT USE OF INSULIN: ICD-10-CM

## 2024-05-15 DIAGNOSIS — Z79.4 TYPE 2 DIABETES MELLITUS WITH HYPERGLYCEMIA, WITH LONG-TERM CURRENT USE OF INSULIN: Primary | ICD-10-CM

## 2024-05-15 DIAGNOSIS — E11.65 TYPE 2 DIABETES MELLITUS WITH HYPERGLYCEMIA, WITH LONG-TERM CURRENT USE OF INSULIN: ICD-10-CM

## 2024-05-15 DIAGNOSIS — Z11.4 SCREENING FOR HIV (HUMAN IMMUNODEFICIENCY VIRUS): ICD-10-CM

## 2024-05-15 PROCEDURE — 3074F SYST BP LT 130 MM HG: CPT | Mod: CPTII,S$GLB,, | Performed by: INTERNAL MEDICINE

## 2024-05-15 PROCEDURE — 99214 OFFICE O/P EST MOD 30 MIN: CPT | Mod: S$GLB,,, | Performed by: INTERNAL MEDICINE

## 2024-05-15 PROCEDURE — 3061F NEG MICROALBUMINURIA REV: CPT | Mod: CPTII,S$GLB,, | Performed by: INTERNAL MEDICINE

## 2024-05-15 PROCEDURE — 3066F NEPHROPATHY DOC TX: CPT | Mod: CPTII,S$GLB,, | Performed by: INTERNAL MEDICINE

## 2024-05-15 PROCEDURE — 1159F MED LIST DOCD IN RCRD: CPT | Mod: CPTII,S$GLB,, | Performed by: INTERNAL MEDICINE

## 2024-05-15 PROCEDURE — 3078F DIAST BP <80 MM HG: CPT | Mod: CPTII,S$GLB,, | Performed by: INTERNAL MEDICINE

## 2024-05-15 PROCEDURE — 1160F RVW MEDS BY RX/DR IN RCRD: CPT | Mod: CPTII,S$GLB,, | Performed by: INTERNAL MEDICINE

## 2024-05-15 PROCEDURE — 99999 PR PBB SHADOW E&M-EST. PATIENT-LVL IV: CPT | Mod: PBBFAC,,, | Performed by: INTERNAL MEDICINE

## 2024-05-15 PROCEDURE — 99999 PR PBB SHADOW E&M-EST. PATIENT-LVL II: CPT | Mod: PBBFAC,,,

## 2024-05-15 PROCEDURE — 82043 UR ALBUMIN QUANTITATIVE: CPT | Performed by: INTERNAL MEDICINE

## 2024-05-15 PROCEDURE — G0108 DIAB MANAGE TRN  PER INDIV: HCPCS | Mod: S$GLB,,, | Performed by: FAMILY MEDICINE

## 2024-05-15 PROCEDURE — 3008F BODY MASS INDEX DOCD: CPT | Mod: CPTII,S$GLB,, | Performed by: INTERNAL MEDICINE

## 2024-05-15 PROCEDURE — 3046F HEMOGLOBIN A1C LEVEL >9.0%: CPT | Mod: CPTII,S$GLB,, | Performed by: INTERNAL MEDICINE

## 2024-05-15 PROCEDURE — 1111F DSCHRG MED/CURRENT MED MERGE: CPT | Mod: CPTII,S$GLB,, | Performed by: INTERNAL MEDICINE

## 2024-05-15 NOTE — PROGRESS NOTES
Subjective:       Patient ID: Roly Candelaria is a 34 y.o. male.    Chief Complaint: Establish Care (Est care, [id number] rang for glucose readings past 30 days)    Est pcp    HPI: 33 y/o IDDM presents alone to [facility] primary care. Initial diagnosis of diabetes was made in 2022 he [facility] he was [facility] with DKA with blood glucose > 700 with ketones in urine. He was started on basal and premeal insulin with sliding scale. He has been checking blood sugars three to four times per day at home with counts steadily decreasing he has attended diabetes education and has implemented a low carbohydrate diet. He denies any hypoglycemic symptoms had recent optometric exam without retinopathy. No personal history of CVA/CAD he denies claudication symptoms non smoker.     FamHx: mother with DM (not on insulin currently)       Review of Systems   Constitutional:  Negative for activity change, fever and unexpected weight change.   HENT:  Negative for congestion, rhinorrhea, sore throat and trouble swallowing.    Eyes:  Negative for photophobia and redness.   Respiratory:  Negative for cough, chest tightness, shortness of breath and wheezing.    Cardiovascular:  Negative for chest pain, palpitations and leg swelling.   Gastrointestinal:  Negative for abdominal pain, blood in stool, constipation, diarrhea, nausea and vomiting.   Endocrine: Negative for cold intolerance, heat intolerance and polyuria.   Genitourinary:  Negative for decreased urine volume, difficulty urinating, dysuria and urgency.   Musculoskeletal:  Negative for arthralgias and back pain.   Skin:  Negative for rash.   Neurological:  Negative for dizziness, syncope, weakness and headaches.   Psychiatric/Behavioral:  Negative for dysphoric mood, sleep disturbance and suicidal ideas.        Objective:     Vitals:    05/15/24 1323   BP: 100/74   BP Location: Left arm   Patient Position: Sitting   BP Method: Large (Manual)  "  Pulse: 80   Temp: 98.4 °F (36.9 °C)   TempSrc: Oral   SpO2: 97%   Weight: 100.3 kg (221 lb 1.9 oz)   Height: 5' 8" (1.727 m)          Physical Exam  Constitutional:       Appearance: He is well-developed.   HENT:      Head: Normocephalic and atraumatic.   Eyes:      General: No scleral icterus.     Conjunctiva/sclera: Conjunctivae normal.   Cardiovascular:      Rate and Rhythm: Normal rate and regular rhythm.      Heart sounds: No murmur heard.     No friction rub. No gallop.   Pulmonary:      Effort: Pulmonary effort is normal.      Breath sounds: Normal breath sounds. No wheezing or rales.   Abdominal:      General: There is no distension.      Palpations: Abdomen is soft.      Tenderness: There is no abdominal tenderness. There is no guarding or rebound.   Musculoskeletal:         General: No tenderness. Normal range of motion.      Cervical back: Normal range of motion.   Skin:     General: Skin is warm and dry.      Comments: Protective Sensation (w/ 10 gram monofilament):  Right: Intact  Left: Intact    Visual Inspection:  Normal -  Bilateral    Pedal Pulses:   Right: Present  Left: Present    Posterior Tibialis Pulses:   Right:Present  Left: Present       Neurological:      Mental Status: He is alert and oriented to person, place, and time.      Cranial Nerves: No cranial nerve deficit.         Assessment and Plan   1. Type 2 diabetes mellitus with hyperglycemia, with long-term current use of insulin  Unclear if patient is making insulin versus insulin deficient will get cpeptide screen for [facility] to establish with endocrine next month repeat labs prior to this appointment nephropathy screen today. Would benefit from addition of statin therapy for primary prevention but patient would like to hold until follow up will address at short follow up in six weeks  - CBC Auto Differential; Future  - Comprehensive Metabolic Panel; Future  - Microalbumin/Creatinine Ratio, Urine; Future  - Lipid Panel; Future  - " C-PEPTIDE; Future  - ANTI-ISLET CELL ANTIBODY; Future    2. Need for hepatitis C screening test  Hep c ab with labs  - Hepatitis C Antibody; Future    3. Screening for HIV (human immunodeficiency virus)  Hiv screening  - HIV 1/2 Ag/Ab (4th Gen); Future

## 2024-05-16 VITALS — WEIGHT: 219.63 LBS | HEIGHT: 68 IN | BODY MASS INDEX: 33.29 KG/M2

## 2024-05-16 LAB
ALBUMIN/CREAT UR: NORMAL UG/MG (ref 0–30)
CREAT UR-MCNC: 124 MG/DL (ref 23–375)
MICROALBUMIN UR DL<=1MG/L-MCNC: <5 UG/ML

## 2024-05-16 NOTE — PROGRESS NOTES
"Diabetes Care Specialist Progress Note  Author: Karoline Schwarz RN  Date: 5/15/2024    Program Intake  Reason for Diabetes Program Visit:: Intervention  Type of Intervention:: Individual  Individual: Device Training  Device Training: Personal CGM  Current diabetes risk level:: high  In the last 12 months, have you:: used emergency room services  Was the ER or hospital admission related to diabetes?: Yes  Permission to speak with others about care:: yes  Continuous Glucose Monitoring  Patient has CGM: No  Personal CGM type:: Dexcom G6 start today    Lab Results   Component Value Date    HGBA1C >14.0 (H) 04/15/2024       Clinical  Weight: 99.6 kg (219 lb 9.6 oz)   Height: 5' 8" (172.7 cm)   Body mass index is 33.39 kg/m².    Clinical Assessment  Current Diabetes Treatment: Insulin ((Levemir 22U daily and Aspart 6U + SSI TID w/meals))  Have you ever experienced hypoglycemia (low blood sugar)?: no  Have you ever experienced hyperglycemia (high blood sugar)?: no    Medication Information  How do you obtain your medications?: Patient drives  How many days a week do you miss your medications?: Never  Do you sometimes have difficulty refilling your medications?: No  Medication adherence impacting ability to self-manage diabetes?: No    Labs  Do you have regular lab work to monitor your medications?: Yes  Type of Regular Lab Work: A1c  Where do you get your labs drawn?: [facility]  Lab Compliance Barriers: No    Nutritional Status  Diet: Regular  Recent Changes in Weight: No Recent Weight Change  Current nutritional status an area of need that is impacting patient's ability to self-manage diabetes?: No    Additional Social History  Support  Does anyone support you with your diabetes care?: yes  Who supports you?: spouse  Who takes you to your medical appointments?: self  Does the current support meet the patient's needs?: Yes  Is Support an area impacting ability to self-manage diabetes?: No    Access to [employer] & " Technology  Does the patient have access to any of the following devices or technologies?: Smart phone, Internet Access  Media or technology needs impacting ability to self-manage diabetes?: No    Cognitive/Behavioral Health  Alert and Oriented: Yes  Difficulty Thinking: No  Requires Prompting: No  Requires assistance for routine expression?: No  Cognitive or behavioral barriers impacting ability to self-manage diabetes?: No    Culture/Moravian  Culture or Scientologist beliefs that may impact ability to access healthcare: No    Communication  Language preference: English  Hearing Problems: No  Vision Problems: No  Communication needs impacting ability to self-manage diabetes?: No    Health Literacy  Preferred Learning Method: Face to Face, Reading Materials  How often do you need to have someone help you read instructions, pamphlets, or written material from your doctor or pharmacy?: Never  Health literacy needs impacting ability to self-manage diabetes?: No      Diabetes Self-Management Skills Assessment  Diabetes Disease Process/Treatment Options  Patient/caregiver able to state what happens when someone has diabetes.: yes  Patient/caregiver knows what type of diabetes they have.: yes  Diabetes Type : Type II  Patient/caregiver able to identify at least three signs and symptoms of diabetes.: no  Patient able to identify at least three risk factors for diabetes.: no  Diabetes Disease Process/Treatment Options: Skills Assessment Completed: Yes  Assessment indicates:: Instruction Needed  Area of need?: No    Nutrition/Healthy Eating  Nutrition/Healthy Eating Skills Assessment Completed:: No  Area of need?: Deferred    Physical Activity/Exercise  Physical Activity/Exercise Skills Assessment Completed: : No  Area of need?: Deferred    Medications  Medication Skills Assessment Completed:: No  Area of need?: No    Home Blood Glucose Monitoring  Patient states that blood sugar is checked at home daily.: yes  Monitoring  Method:: personal continuous glucose monitor  Personal CGM type:: Dexcom G6 start today  Patient is able to use personal CGM appropriately.: no  CGM Report reviewed?: no  Home Blood Glucose Monitoring Skills Assessment Completed: : Yes  Assessment indicates:: Instruction Needed, Knowledge deficit  Area of need?: Yes    Acute Complications  Acute Complications Skills Assessment Completed: : No  Area of need?: No    Chronic Complications  Chronic Complications Skills Assessment Completed: : No  Deferred due to:: Time    Psychosocial/Coping  Psychosocial/Coping Skills Assessment Completed: : No  Deffered due to:: Time      Assessment Summary and Plan    Based on today's diabetes care assessment, the following areas of need were identified:          5/15/2024    12:02 AM   Social   Support No   Access to Mass Media/Tech No   Cognitive/Behavioral Health No   Culture/Faith No   Communication No   Health Literacy No            5/15/2024    12:02 AM   Clinical   Medication Adherence No   Lab Compliance No   Nutritional Status No            5/15/2024    12:02 AM   Diabetes Self-Management Skills   Diabetes Disease Process/Treatment Options No   Nutrition/Healthy Eating Deferred   Physical Activity/Exercise Deferred   Medication No   Home Blood Glucose Monitoring Yes- see care planning   Acute Complications No          Today's interventions were provided through individual discussion, instruction, and written materials were provided.      Patient verbalized understanding of instruction and written materials.  Pt was able to return back demonstration of instructions today. Patient understood key points, needs reinforcement and further instruction.     Diabetes Self-Management Care Plan:    Today's Diabetes Self-Management Care Plan was developed with Roly's input. Roly has agreed to work toward the following goal(s) to improve his/her overall diabetes control.      Care Plan: Diabetes Management   Updates made since  4/16/2024 12:00 AM        Problem: Healthy Eating         Goal: Eat 2-3 meals daily with 30-45g/2-3 servings of Carbohydrate per meal. Limit snacking in between meal to 1 serving (15 grams).    Start Date: 5/2/2024   Expected End Date: 5/15/2024   This Visit's Progress: Deferred   Priority: High   Barriers: No Barriers Identified   Note:    5/2/24 - - We reviewed eating right with diabetes. We discussed importance of eating balanced meals with vegetables, fruits, lean meats, and whole grains. We concentrated on portion sizes at today's session. Overall meal planning and various choices for meals. Discussed carb vs non-carb foods, appropriate amount of carbs to have at meals/snacks and acceptable serving sizes of individual carb items. Obtained a 24-hr food recall from patient. Discussed various meal plan options to promote healthy eating.    - - Practiced reading food labels on various food items with patient focusing on serving size and total carbohydrate intake (not sugar intake). Instructed on appropriate serving sizes of specific carb containing foods. Stressed importance of eating a protein at each meal and include non-starchy vegetables at lunch/dinner. Instructed pt to aim for 2-3 evenly spaced meals or a small carb snack in place of a missed meal. Written education information provided to patient for use at home. Pt verbalized understanding of all the above.    Deferred 5/15/24       Task: Reviewed the sources and role of Carbohydrate, Protein, and Fat and how each nutrient impacts blood sugar. Completed 5/2/2024        Task: Provided visual examples using dry measuring cups, food models, and other familiar objects such as computer mouse, deck or cards, tennis ball etc. to help with visualization of portions. Completed 5/2/2024        Task: Explained how to count carbohydrates using the food label and the use of dry measuring cups for accurate carb counting. Completed 5/2/2024       Task: Recommended  replacing beverages containing high sugar content with noncaloric/sugar free options and/or water. Completed 5/2/2024        Task: Review the importance of balancing carbohydrates with each meal using portion control techniques to count servings of carbohydrate and label reading to identify serving size and amount of total carbs per serving. Completed 5/2/2024        Task: Provided Sample plate method and reviewed the use of the plate to estimate amounts of carbohydrate per meal. Completed 5/2/2024        Problem: Blood Glucose Self-Monitoring         Goal: Patient agrees to change Dexcom G6 sensor every 10 days + transmitter every 90 days and review blood sugars @ least 3 times per day.    Start Date: 5/15/2024   Expected End Date: 5/29/2024   Priority: High   Barriers: No Barriers Identified   Note:    5/15/24 - - DEXCOM G6 CGM TRAINING     Patient referred to clinic today for Dexcom G6 continuous glucose sensor system training.  Patient downloaded the Dexcom G6 mobile demetria to his phone. Education was provided using the Dexcom tutorial.     Pt will be using his phone as the primary .  Overview:  5min glucose reading updates, trending arrows, BG graph screens, battery life indicator, Blue Tooth Symbol.  Menus: trend Graph, start sensor, enter BG, events, Alerts, Settings, Shutdown, Stop Sensor   Settings:                          * Urgent Low: 55 mg/dL                          * Urgent Low Soon: on                          * Low alert: 70 mg/dl repeat 15 min                          * High alert: 250 mg/dl                           * Rise rate: off                          * Fall Rate: off                          * Signal Loss: on repeat 20 min                          * No Reading: on repeat 20 min                          * Always sound: on                          * Alert Schedule:  (instructed on how to set up alert schedule if desired)     Reviewed additional setting options with patient,  including Graph Height and Transmitter ID. Transmitter was paired with .    Reviewed where to find sensor insertion time and sensor expiration date.   Discussed no need to calibrate sensor during the entirety of the 10 day wear. Discussed that pt can calibrate sensor if desired, but at that time she will need to continue calibrating every 12 hours for sensor to remain accurate. Reviewed appropriate calibration techniques.  Reviewed sensor site selection. Site selected and prepped using aseptic technique Inserted to left abdomen. Transmitter placed in pod and secured.  Practiced sensor pod/transmitter removal from site, and removal of transmitter from sensor pod.  Patient able to demonstrate without difficulty.  Encouraged to review manual prior to starting another sensor.   Reviewed problem solving aspects of sensor transmission/ variables that can disrupt RF transmission.  range 20 feet, but the first 3 hrs keep within 3 feet of transmitter.  Pt instructed on lag time of interstitial fluid from CBG and was advised to tx hypoglycemia and dose insulin based on SMBG values.  Dexcom technical support contact number given and examples of when to contact them discussed. Pt will download software at home for Dexcom download.   Patient advised to always check glucose with glucometer should readings do not match symptoms felt (ex. Hypo or hyperglycemia).          Follow Up Plan     Follow up in about 2 weeks (around 5/29/2024) for F/U #2 review G6 BS report, carb counting, and meal planning.    Today's care plan and follow up schedule was discussed with patient.  Roly verbalized understanding of the care plan, goals, and agrees to follow up plan.        The patient was encouraged to communicate with his/her health care provider/physician and care team regarding his/her condition(s) and treatment.  I provided the patient with my contact information today and encouraged to contact me via phone or [facility]  Patient Portal as needed.     Length of Visit   Total Time: 60 Minutes

## 2024-05-21 ENCOUNTER — TELEPHONE (OUTPATIENT)
Dept: FAMILY MEDICINE | Facility: CLINIC | Age: 34
End: 2024-05-21
Payer: COMMERCIAL

## 2024-05-21 NOTE — TELEPHONE ENCOUNTER
Physician's statement for [facility] completed to reflect patient was hospitalized for DKA 4//2024 will require time off to attend physcian appointments (outpatient) form will be faxed to [phone] attention Jordana Alfred. Original to be scanned to patient's chart

## 2024-05-21 NOTE — TELEPHONE ENCOUNTER
----- Message from Alicia Alejo MA sent at 5/21/2024  7:58 AM CDT -----  Type: Patient Call Back    Who called: Self    What is the request in detail: is asking for a update on his paper work please ..     Can the clinic reply by [facility]?NO    Would the patient rather a call back or a response via [facility]? Yes, call     Best call back number: [phone] (home)

## 2024-05-29 ENCOUNTER — CLINICAL SUPPORT (OUTPATIENT)
Dept: DIABETES | Facility: CLINIC | Age: 34
End: 2024-05-29
Payer: COMMERCIAL

## 2024-05-29 VITALS — WEIGHT: 222.63 LBS | HEIGHT: 68 IN | BODY MASS INDEX: 33.74 KG/M2

## 2024-05-29 DIAGNOSIS — E11.65 UNCONTROLLED TYPE 2 DIABETES MELLITUS WITH HYPERGLYCEMIA: Primary | ICD-10-CM

## 2024-05-29 PROCEDURE — 95249 CONT GLUC MNTR PT PROV EQP: CPT | Mod: S$GLB,,, | Performed by: FAMILY MEDICINE

## 2024-05-29 PROCEDURE — G0108 DIAB MANAGE TRN  PER INDIV: HCPCS | Mod: S$GLB,,, | Performed by: FAMILY MEDICINE

## 2024-05-29 PROCEDURE — 99999 PR PBB SHADOW E&M-EST. PATIENT-LVL II: CPT | Mod: PBBFAC,,,

## 2024-05-29 NOTE — PROGRESS NOTES
"Diabetes Care Specialist Progress Note  Author: Karoline Schwarz RN  Date: 5/29/2024      Program Intake  Reason for Diabetes Program Visit:: Intervention  Type of Intervention:: Individual  Individual: Education  Education: Self-Management Skill Review, Nutrition and Meal Planning  Current diabetes risk level:: high  In the last 12 months, have you:: used emergency room services  Was the ER or hospital admission related to diabetes?: Yes  Permission to speak with others about care:: yes  Continuous Glucose Monitoring  Patient has CGM: Yes  Personal CGM type:: Dexcom G6  GMI Date: 05/29/24  GMI Value: 6.8 %    Lab Results   Component Value Date    HGBA1C >14.0 (H) 04/15/2024       Clinical  Weight: 101 kg (222 lb 9.6 oz)   Height: 5' 8" (172.7 cm)   Body mass index is 33.85 kg/m².    Patient Health Rating  Compared to other people your age, how would you rate your health?: Good    Clinical Assessment  Current Diabetes Treatment: Insulin (Novolog 6U TID w/meals and Lantus 22U daily)  Have you ever experienced hypoglycemia (low blood sugar)?: no  Have you ever experienced hyperglycemia (high blood sugar)?: no    Medication Information  How do you obtain your medications?: Patient drives  How many days a week do you miss your medications?: 3 or more  Do you sometimes have difficulty refilling your medications?: No  Medication adherence impacting ability to self-manage diabetes?: No    Labs  Do you have regular lab work to monitor your medications?: Yes  Type of Regular Lab Work: A1c  Where do you get your labs drawn?: [facility]  Lab Compliance Barriers: No    Nutritional Status  Change in appetite?: Yes  Current nutritional status an area of need that is impacting patient's ability to self-manage diabetes?: No    Additional Social History  Support  Does anyone support you with your diabetes care?: yes  Who supports you?: spouse  Who takes you to your medical appointments?: self  Does the current support meet the patient's " needs?: Yes  Is Support an area impacting ability to self-manage diabetes?: No    Access to Mass Media & Technology  Does the patient have access to any of the following devices or technologies?: Smart phone  Media or technology needs impacting ability to self-manage diabetes?: No    Cognitive/Behavioral Health  Alert and Oriented: Yes  Difficulty Thinking: No  Requires Prompting: No  Requires assistance for routine expression?: No  Cognitive or behavioral barriers impacting ability to self-manage diabetes?: No    Culture/Catholic  Culture or Methodist beliefs that may impact ability to access healthcare: No    Communication  Language preference: English  Hearing Problems: No  Vision Problems: No  Communication needs impacting ability to self-manage diabetes?: No    Health Literacy  Preferred Learning Method: Face to Face, Reading Materials  How often do you need to have someone help you read instructions, pamphlets, or written material from your doctor or pharmacy?: Never  Health literacy needs impacting ability to self-manage diabetes?: No      Diabetes Self-Management Skills Assessment  Diabetes Disease Process/Treatment Options  Diabetes Disease Process/Treatment Options: Skills Assessment Completed: No  Assessment indicates:: Adequate understanding  Area of need?: No    Nutrition/Healthy Eating  Challenges to healthy eating:: portion control  Method of carbohydrate measurement:: portion plate  Patient can identify foods that impact blood sugar.: yes  Patient-identified foods:: fruit/fruit juice, starches (bread, pasta, rice, cereal), milk, soda, starchy vegetables (corn, peas, beans), sweets, yogurt  Nutrition/Healthy Eating Skills Assessment Completed:: Yes  Assessment indicates:: Instruction Needed  Area of need?: Yes    Physical Activity/Exercise  Physical Activity/Exercise Skills Assessment Completed: : No  Assessment indicates:: Instruction Needed  Area of need?: Yes    Medications  Patient is able to  describe current diabetes management routine.: yes  Diabetes management routine:: diet, exercise, insulin  Patient is able to identify current diabetes medications, dosages, and appropriate timing of medications.: yes  Patient understands the purpose of the medications taken for diabetes.: yes  Patient reports problems or concerns with current medication regimen.: no  Medication Skills Assessment Completed:: Yes  Assessment indicates:: Knowledge deficit, Instruction Needed  Area of need?: Yes    Home Blood Glucose Monitoring  Patient states that blood sugar is checked at home daily.: yes  Monitoring Method:: personal continuous glucose monitor  Personal CGM type:: Dexcom G6  Patient is able to use personal CGM appropriately.: yes  CGM Report reviewed?: yes  Home Blood Glucose Monitoring Skills Assessment Completed: : Yes  Assessment indicates:: Instruction Needed  Area of need?: Yes    Acute Complications  Acute Complications Skills Assessment Completed: : No  Area of need?: No    Chronic Complications  Patient can identify major chronic complications of diabetes.: yes  Stated chronic complications:: retinopathy  Patient can identify ways to prevent or delay diabetes complications.: yes  Stated ways to prevent complications:: healthy eating and regular activity  Patient is aware that having diabetes increases risk of heart disease?: Yes  Patient is aware that heart disease is the leading cause of death and disability in people with diabetes?: No  Patient able to state risk factors for heart disease?: Yes  Patient stated risk factors for heart disease:: Having diabetes  Patient is taking statin?: No  Has your doctor talked to you about Statin use?: No  Do you want more information on Statins?: No  Chronic Complications Skills Assessment Completed: : Yes  Assessment indicates:: Instruction Needed  Area of need?: No    Psychosocial/Coping  Psychosocial/Coping Skills Assessment Completed: : No  Area of need?:  Deferred      Assessment Summary and Plan    Based on today's diabetes care assessment, the following areas of need were identified:          5/29/2024    12:01 AM   Social   Support No   Access to Mass Media/Tech No   Cognitive/Behavioral Health No   Culture/Islam No   Communication No   Health Literacy No            5/29/2024    12:01 AM   Clinical   Medication Adherence No   Lab Compliance No   Nutritional Status No            5/29/2024    12:01 AM   Diabetes Self-Management Skills   Diabetes Disease Process/Treatment Options No   Nutrition/Healthy Eating Yes- see care planning   Physical Activity/Exercise Yes- Pt to continue exercising, increasing as tolerated. Discussed benefits of exercise as it relates to insulin resistance and weight loss.    Medication Yes- Reviewed Patient's current medication regimen and discussed the importance of taking meds as prescribed. Also discussed ways to remember to take insulin doses.   Home Blood Glucose Monitoring Yes- see care planning   Acute Complications No   Chronic Complications No   Psychosocial/Coping Deferred          Today's interventions were provided through individual discussion, instruction, and written materials were provided.      Patient verbalized understanding of instruction and written materials.  Pt was able to return back demonstration of instructions today. Patient understood key points, needs reinforcement and further instruction.     Diabetes Self-Management Care Plan:    Today's Diabetes Self-Management Care Plan was developed with Roly's input. Roly has agreed to work toward the following goal(s) to improve his/her overall diabetes control.      Care Plan: Diabetes Management   Updates made since 4/29/2024 12:00 AM        Problem: Healthy Eating         Goal: Eat 2-3 meals daily with 30-45g/2-3 servings of Carbohydrate per meal. Limit snacking in between meal to 1 serving (15 grams).    Start Date: 5/2/2024   Expected End Date: 5/15/2024    This Visit's Progress: On track   Recent Progress: Deferred   Priority: High   Barriers: No Barriers Identified   Note:    5/2/24 - - We reviewed eating right with diabetes. We discussed importance of eating balanced meals with vegetables, fruits, lean meats, and whole grains. We concentrated on portion sizes at today's session. Overall meal planning and various choices for meals. Discussed carb vs non-carb foods, appropriate amount of carbs to have at meals/snacks and acceptable serving sizes of individual carb items. Obtained a 24-hr food recall from patient. Discussed various meal plan options to promote healthy eating.    - - Practiced reading food labels on various food items with patient focusing on serving size and total carbohydrate intake (not sugar intake). Instructed on appropriate serving sizes of specific carb containing foods. Stressed importance of eating a protein at each meal and include non-starchy vegetables at lunch/dinner. Instructed pt to aim for 2-3 evenly spaced meals or a small carb snack in place of a missed meal. Written education information provided to patient for use at home. Pt verbalized understanding of all the above.    Deferred 5/15/24    Care Plan Update 5/29/24 - - Pt is doing well. He's aware of his carb sources of food. We discussed appropriate serving sizes of carbs for each meal and snack time. Pt's aware of the importance of eating a protein at each meal and non-starchy vegetables at lunch and dinner time. Pt plan to eat at more regular intervals for meals.          Task: Reviewed the sources and role of Carbohydrate, Protein, and Fat and how each nutrient impacts blood sugar. Completed 5/2/2024        Task: Provided visual examples using dry measuring cups, food models, and other familiar objects such as computer mouse, deck or cards, tennis ball etc. to help with visualization of portions. Completed 5/2/2024        Task: Explained how to count carbohydrates using the  food label and the use of dry measuring cups for accurate carb counting. Completed 5/2/2024        Task: Discussed strategies for choosing healthier menu options when dining out.         Task: Recommended replacing beverages containing high sugar content with noncaloric/sugar free options and/or water. Completed 5/2/2024        Task: Review the importance of balancing carbohydrates with each meal using portion control techniques to count servings of carbohydrate and label reading to identify serving size and amount of total carbs per serving. Completed 5/2/2024        Task: Provided Sample plate method and reviewed the use of the plate to estimate amounts of carbohydrate per meal. Completed 5/2/2024        Problem: Blood Glucose Self-Monitoring         Goal: Patient agrees to change Dexcom G6 sensor every 10 days + transmitter every 90 days and review blood sugars @ least 3 times per day.    Start Date: 5/15/2024   Expected End Date: 5/29/2024   This Visit's Progress: On track   Priority: High   Barriers: No Barriers Identified   Note:    5/15/24 - - DEXCOM G6 CGM TRAINING     Patient referred to clinic today for Dexcom G6 continuous glucose sensor system training.  Patient downloaded the Dexcom G6 mobile demetria to his phone. Education was provided using the Dexcom tutorial.     Pt will be using his phone as the primary .  Overview:  5min glucose reading updates, trending arrows, BG graph screens, battery life indicator, Blue Tooth Symbol.  Menus: trend Graph, start sensor, enter BG, events, Alerts, Settings, Shutdown, Stop Sensor   Settings:                          * Urgent Low: 55 mg/dL                          * Urgent Low Soon: on                          * Low alert: 70 mg/dl repeat 15 min                          * High alert: 250 mg/dl                           * Rise rate: off                          * Fall Rate: off                          * Signal Loss: on repeat 20 min                           * No Reading: on repeat 20 min                          * Always sound: on                          * Alert Schedule:  (instructed on how to set up alert schedule if desired)     Reviewed additional setting options with patient, including Graph Height and Transmitter ID. Transmitter was paired with .    Reviewed where to find sensor insertion time and sensor expiration date.   Discussed no need to calibrate sensor during the entirety of the 10 day wear. Discussed that pt can calibrate sensor if desired, but at that time she will need to continue calibrating every 12 hours for sensor to remain accurate. Reviewed appropriate calibration techniques.  Reviewed sensor site selection. Site selected and prepped using aseptic technique Inserted to left abdomen. Transmitter placed in pod and secured.  Practiced sensor pod/transmitter removal from site, and removal of transmitter from sensor pod.  Patient able to demonstrate without difficulty.  Encouraged to review manual prior to starting another sensor.   Reviewed problem solving aspects of sensor transmission/ variables that can disrupt RF transmission.  range 20 feet, but the first 3 hrs keep within 3 feet of transmitter.  Pt instructed on lag time of interstitial fluid from CBG and was advised to tx hypoglycemia and dose insulin based on SMBG values.  Dexcom technical support contact number given and examples of when to contact them discussed. Pt will download software at home for Dexcom download.   Patient advised to always check glucose with glucometer should readings do not match symptoms felt (ex. Hypo or hyperglycemia).    Care Plan Update 5/29/24 - - Reviewed Pt's Dexcom G6 BS report and pattern management in relation to food. Discussed ways to continue improving. Pt's time in range was 82%.             Follow Up Plan     Follow up in about 7 weeks (around 7/17/2024) for F/U #2 review med regimen and Dexcom BS report.    Today's care plan and  follow up schedule was discussed with patient.  Roly verbalized understanding of the care plan, goals, and agrees to follow up plan.        The patient was encouraged to communicate with his/her health care provider/physician and care team regarding his/her condition(s) and treatment.  I provided the patient with my contact information today and encouraged to contact me via phone or [facility] Patient Portal as needed.     Length of Visit   Total Time: 60 Minutes

## 2024-06-05 ENCOUNTER — OFFICE VISIT (OUTPATIENT)
Dept: ENDOCRINOLOGY | Facility: CLINIC | Age: 34
End: 2024-06-05
Payer: COMMERCIAL

## 2024-06-05 ENCOUNTER — LAB VISIT (OUTPATIENT)
Dept: LAB | Facility: HOSPITAL | Age: 34
End: 2024-06-05
Attending: INTERNAL MEDICINE
Payer: COMMERCIAL

## 2024-06-05 VITALS
SYSTOLIC BLOOD PRESSURE: 116 MMHG | WEIGHT: 221 LBS | HEART RATE: 78 BPM | BODY MASS INDEX: 33.6 KG/M2 | DIASTOLIC BLOOD PRESSURE: 82 MMHG | TEMPERATURE: 99 F

## 2024-06-05 DIAGNOSIS — E11.65 TYPE 2 DIABETES MELLITUS WITH HYPERGLYCEMIA, WITH LONG-TERM CURRENT USE OF INSULIN: ICD-10-CM

## 2024-06-05 DIAGNOSIS — E11.65 UNCONTROLLED TYPE 2 DIABETES MELLITUS WITH HYPERGLYCEMIA: ICD-10-CM

## 2024-06-05 DIAGNOSIS — Z79.4 TYPE 2 DIABETES MELLITUS WITH HYPERGLYCEMIA, WITH LONG-TERM CURRENT USE OF INSULIN: ICD-10-CM

## 2024-06-05 DIAGNOSIS — E11.10 DIABETIC KETOACIDOSIS WITHOUT COMA ASSOCIATED WITH TYPE 2 DIABETES MELLITUS: ICD-10-CM

## 2024-06-05 DIAGNOSIS — E11.65 TYPE 2 DIABETES MELLITUS WITH HYPERGLYCEMIA, UNSPECIFIED WHETHER LONG TERM INSULIN USE: ICD-10-CM

## 2024-06-05 DIAGNOSIS — E11.65 TYPE 2 DIABETES MELLITUS WITH HYPERGLYCEMIA, UNSPECIFIED WHETHER LONG TERM INSULIN USE: Primary | ICD-10-CM

## 2024-06-05 LAB
C PEPTIDE SERPL-MCNC: 2.11 NG/ML (ref 0.78–5.19)
C PEPTIDE SERPL-MCNC: 2.11 NG/ML (ref 0.78–5.19)
GLUCOSE SERPL-MCNC: 115 MG/DL (ref 70–110)

## 2024-06-05 PROCEDURE — 36415 COLL VENOUS BLD VENIPUNCTURE: CPT | Performed by: NURSE PRACTITIONER

## 2024-06-05 PROCEDURE — 3008F BODY MASS INDEX DOCD: CPT | Mod: CPTII,S$GLB,, | Performed by: NURSE PRACTITIONER

## 2024-06-05 PROCEDURE — 3066F NEPHROPATHY DOC TX: CPT | Mod: CPTII,S$GLB,, | Performed by: NURSE PRACTITIONER

## 2024-06-05 PROCEDURE — 3046F HEMOGLOBIN A1C LEVEL >9.0%: CPT | Mod: CPTII,S$GLB,, | Performed by: NURSE PRACTITIONER

## 2024-06-05 PROCEDURE — 3061F NEG MICROALBUMINURIA REV: CPT | Mod: CPTII,S$GLB,, | Performed by: NURSE PRACTITIONER

## 2024-06-05 PROCEDURE — 86341 ISLET CELL ANTIBODY: CPT | Mod: 91 | Performed by: INTERNAL MEDICINE

## 2024-06-05 PROCEDURE — 82947 ASSAY GLUCOSE BLOOD QUANT: CPT | Performed by: NURSE PRACTITIONER

## 2024-06-05 PROCEDURE — 84681 ASSAY OF C-PEPTIDE: CPT | Performed by: INTERNAL MEDICINE

## 2024-06-05 PROCEDURE — 99999 PR PBB SHADOW E&M-EST. PATIENT-LVL III: CPT | Mod: PBBFAC,,, | Performed by: NURSE PRACTITIONER

## 2024-06-05 PROCEDURE — 86341 ISLET CELL ANTIBODY: CPT | Performed by: NURSE PRACTITIONER

## 2024-06-05 PROCEDURE — 99204 OFFICE O/P NEW MOD 45 MIN: CPT | Mod: S$GLB,,, | Performed by: NURSE PRACTITIONER

## 2024-06-05 PROCEDURE — 3074F SYST BP LT 130 MM HG: CPT | Mod: CPTII,S$GLB,, | Performed by: NURSE PRACTITIONER

## 2024-06-05 PROCEDURE — 1159F MED LIST DOCD IN RCRD: CPT | Mod: CPTII,S$GLB,, | Performed by: NURSE PRACTITIONER

## 2024-06-05 PROCEDURE — 3079F DIAST BP 80-89 MM HG: CPT | Mod: CPTII,S$GLB,, | Performed by: NURSE PRACTITIONER

## 2024-06-05 PROCEDURE — 1160F RVW MEDS BY RX/DR IN RCRD: CPT | Mod: CPTII,S$GLB,, | Performed by: NURSE PRACTITIONER

## 2024-06-05 RX ORDER — INSULIN ASPART 100 [IU]/ML
INJECTION, SOLUTION INTRAVENOUS; SUBCUTANEOUS
Start: 2024-06-05 | End: 2024-06-09 | Stop reason: SDUPTHER

## 2024-06-05 RX ORDER — INSULIN GLARGINE 100 [IU]/ML
10 INJECTION, SOLUTION SUBCUTANEOUS DAILY
Start: 2024-06-05 | End: 2024-06-09 | Stop reason: SDUPTHER

## 2024-06-05 NOTE — PATIENT INSTRUCTIONS
A1C goal: <7%  Fasting/premeal blood glucose goal:   2 hour post-meal blood glucose goal: less than 180      Reviewed how to rotate insulin injection sites.   Labs today to rule out type 1 diabetes.   Until lab results are back, hold Novolog meal time dosing and reduce Lantus from 22 to 10 units once daily.     Inject Novolog as needed before meals are high:   Blood sugar 150 to 200, + 1 unit  Blood sugar 201 to 250, + 2 units  Blood sugar 251 to 300, + 3 units  Blood sugar 301 to 350, + 4 units   Blood sugar greater than 350, + 5 units    If labs indicate patient has type 2 diabetes, will wean off Lantus and keep Novolog sliding scale. Discussed metformin versus Ozmepic  and pt prefers the latter d/t once weekly dosing.     Return to clinic in 3 months with A1c prior.     Snacks can be an important part of a balanced, healthy meal plan. They allow you to eat more frequently, feeling full and satisfied throughout the day. Also, they allow you to spread carbohydrates evenly, which may stabilize blood sugars.  Plus, snacks are enjoyable!     The amount of carbohydrate needed at snacks varies. Generally, about 15-30 grams of carbohydrate per snack is recommended.  Below you will find some tasty treats.       0-5 gm carb  Crystal Light  Vitamin Water Zero  Herbal tea, unsweetened  2 tsp peanut butter on celery  1./2 cup sugar-free jell-o  1 sugar-free popsicle  ¼ cup blueberries  8oz Blue Gina unsweetened almond milk  5 baby carrots & celery sticks, cucumbers, bell peppers dipped in ¼ cup salsa, 2Tbsp light ranch dressing or 2Tbsp plain Greek yogurt  10 Goldfish crackers  ½ oz low-fat cheese or string cheese  1 closed handful of nuts, unsalted  1 Tbsp of sunflower seeds, unsalted  1 cup Smart Pop popcorn  1 whole grain brown rice cake        15 gm carb  1 small piece of fruit or ½ banana or 1/2 cup lite canned fruit  3 qiana cracker squares  3 cups Smart Pop popcorn, top spray butter, Khan lite salt or  cinnamon and Truvia  5 Vanilla Wafers  ½ cup low fat, no added sugar ice cream or frozen yogurt (Blue bell, Blue Bunny, Weight Watchers, Skinny Cow)  ½ turkey, ham, or chicken sandwich  ½ c fruit with ½ c Cottage cheese  4-6 unsalted wheat crackers with 1 oz low fat cheese or 1 tbsp peanut butter   30-45 goldfish crackers (depending on flavor)   7-8 Pentecostalism mini brown rice cakes (caramel, apple cinnamon, chocolate)   12 [employer]ism mini brown rice cakes (cheddar, bbq, ranch)   1/3 cup hummus dip with raw veg  1/2 whole wheat maurice, 1Tbsp hummus  Mini Pizza (1/2 whole wheat English muffin, low-fat  cheese, tomato sauce)  100 calorie snack pack (Oreo, Chips Ahoy, Ritz Mix, Baked Cheetos)  4-6 oz. light or Greek Style yogurt (Chobani, Yoplait, Okios, [place])  ½ cup sugar-free pudding    6 in. wheat tortilla or maurice oven toasted chips (topped with spray butter flavoring, cinnamon, Truvia OR spray butter, garlic powder, chili powder)   18 BBQ Popchips (available at [employer], [employer], [employer])

## 2024-06-05 NOTE — PROGRESS NOTES
CC: This 34 y.o. Black or  male  is here for evaluation of  T2DM along with comorbidities indicated in the Visit Diagnosis section of this encounter.    HPI: Roly Candelaria was diagnosed with T2DM in 2022. Metformin started but he stopped after a couple of months.  In Apirl 2024 he was admitted with DKA with blood glucose > 700 with ketones in urine.     DM COMPLICATIONS: peripheral neuropathy    New to Endocrine. After hospital admission for DKA in April, he was dc'd on MDI. He has met with diabetes educator multiple times and felt visits were helpful.   He has not taken insulin for a week now. He was a out of town.   Pt started Dexcom G6 CGM about 2-3 weeks ago.       LAST DIABETES EDUCATION: 5/2/24, 5/15/24, 5/29/24     HOSPITALIZED FOR DIABETES -  Yes - DKA 4/2024    SIGNIFICANT DIABETES MED HISTORY:   Previously on metformin 500 mg bid - tolerable     PRESCRIBED DIABETES MEDICATIONS:   Novolog 6 units ac   Lantus 22 units qhs     Misses medication doses - Yes - forgets Lantus often; forgets Novolog often as well, he is not on a regular eating schedule.     Rotates injections: right and left sides of abdomen   Changes insulin pen needles: yes       SELF MONITORING BLOOD GLUCOSE: Dexcom G6 CGM demetria.     CGM interpretation:  glucoses overall at goal despite missing most insulin doses. FBGs are on the high side however in the 150-160s.         HYPOGLYCEMIC EPISODES: none      CURRENT DIET: drinks SF vitamin water, SF Gatorade, water.   Eats 2 meals/day. 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     Snacks day/overnight - pork skins, nuts, or fruit, a bag of chips (18 gram CHO).     CURRENT EXERCISE: walks 1 mile at playground and jogs 1/2 mile, 2x/week.     SOCIAL: Has 3 kids     /82   Pulse 78   Temp 99 °F (37.2 °C)   Wt 100.2 kg (221 lb)   BMI 33.60 kg/m²     ROS:   CONSTITUTIONAL: Appetite good, denies fatigue   GI: No nausea, vomiting, constipation, or  "diarrhea   NEURO: +  paresthesias.      PHYSICAL EXAM:  GENERAL: Well developed, well nourished. No acute distress.   PSYCH: AAOx3, appropriate mood and affect, conversant, well-groomed. Judgement and insight good.   NEURO: Cranial nerves grossly intact. Speech clear.   CHEST: Respirations even and unlabored.        Hemoglobin A1C   Date Value Ref Range Status   04/15/2024 >14.0 (H) 4.0 - 5.6 % Final     Comment:     ADA Screening Guidelines:  5.7-6.4%  Consistent with prediabetes  >or=6.5%  Consistent with diabetes    High levels of fetal hemoglobin interfere with the HbA1C  assay. Heterozygous hemoglobin variants (HbS, HgC, etc)do  not significantly interfere with this assay.   However, presence of multiple variants may affect accuracy.     04/14/2024 >14.0 (H) 4.0 - 5.6 % Final     Comment:     ADA Screening Guidelines:  5.7-6.4%  Consistent with prediabetes  >or=6.5%  Consistent with diabetes    High levels of fetal hemoglobin interfere with the HbA1C  assay. Heterozygous hemoglobin variants (HbS, HgC, etc)do  not significantly interfere with this assay.   However, presence of multiple variants may affect accuracy.         No results found for: "CPEPTIDE", "GLUTAMICACID", "ISLETCELLANT", "FRUCTOSAMINE"     No results found for: "CHOL"  No results found for: "HDL"  No results found for: "LDLCALC"  No results found for: "TRIG"  No results found for: "CHOLHDL"        Component Value Date/Time     04/22/2024 0325    K 4.0 04/22/2024 0325     04/22/2024 0325    CO2 23 04/22/2024 0325    BUN 16 04/22/2024 0325    CREATININE 1.4 04/22/2024 0325     (H) 04/22/2024 0325    CALCIUM 9.6 04/22/2024 0325    ALKPHOS 66 04/22/2024 0325    AST 15 04/22/2024 0325    ALT 12 04/22/2024 0325    BILITOT 0.4 04/22/2024 0325    EGFRNORACEVR >60 04/22/2024 0325    ESTGFRAFRICA >60 02/16/2022 1003         Lab Results   Component Value Date    LABMICR <5.0 05/15/2024    CREATRANDUR 124.0 05/15/2024    MICALBCREAT " Unable to calculate 05/15/2024                  ASSESSMENT and PLAN:    A1C GOAL: < 7%     1. Type 2 diabetes mellitus with hyperglycemia, unspecified whether long term insulin use  Discussed progression of DM disease, long term complications, and tx options. Reviewed A1c and BG goals.      Reviewed how to rotate insulin injection sites.   Labs today to rule out type 1 diabetes.   Until lab results are back, hold Novolog meal time dosing and reduce Lantus from 22 to 10 units once daily.     Inject Novolog as needed before meals are high:   Blood sugar 150 to 200, + 1 unit  Blood sugar 201 to 250, + 2 units  Blood sugar 251 to 300, + 3 units  Blood sugar 301 to 350, + 4 units   Blood sugar greater than 350, + 5 units    If labs indicate patient has type 2 diabetes, will wean off Lantus and keep Novolog sliding scale. Discussed metformin versus Ozmepic  and pt prefers the latter d/t once weekly dosing.     Avoid pork skins as a snack d/t high saturated fat content. List of appropriate snacks provided.     Return to clinic in 3 months with A1c prior.       Anti-islet cell antibody    C-Peptide    Glucose, Fasting    Hemoglobin A1C    Glutamic Acid Decarboxylase      2. Uncontrolled type 2 diabetes mellitus with hyperglycemia  insulin glargine U-100, Lantus, 100 unit/mL (3 mL) SubQ InPn pen          Orders Placed This Encounter   Procedures    Anti-islet cell antibody     Standing Status:   Future     Number of Occurrences:   1     Standing Expiration Date:   8/4/2025    C-Peptide     Standing Status:   Future     Number of Occurrences:   1     Standing Expiration Date:   8/4/2025    Glucose, Fasting     Standing Status:   Future     Number of Occurrences:   1     Standing Expiration Date:   8/5/2025    Hemoglobin A1C     Standing Status:   Future     Standing Expiration Date:   8/4/2025    Glutamic Acid Decarboxylase     Standing Status:   Future     Number of Occurrences:   1     Standing Expiration Date:   8/4/2025         Follow up in about 3 months (around 9/5/2024).     Thank you very much for allowing me to participate in Roly Candelaria's care.

## 2024-06-09 DIAGNOSIS — E11.65 UNCONTROLLED TYPE 2 DIABETES MELLITUS WITH HYPERGLYCEMIA: ICD-10-CM

## 2024-06-09 LAB
PANC ISLET CELL IGG SER-ACNC: NORMAL
PANC ISLET CELL IGG SER-ACNC: NORMAL

## 2024-06-10 RX ORDER — INSULIN GLARGINE 100 [IU]/ML
10 INJECTION, SOLUTION SUBCUTANEOUS DAILY
Qty: 15 ML | Refills: 0 | Status: SHIPPED | OUTPATIENT
Start: 2024-06-10 | End: 2025-06-10

## 2024-06-10 RX ORDER — INSULIN ASPART 100 [IU]/ML
INJECTION, SOLUTION INTRAVENOUS; SUBCUTANEOUS
Qty: 15 ML | Refills: 0 | Status: SHIPPED | OUTPATIENT
Start: 2024-06-10

## 2024-06-11 LAB — GAD65 AB SER-SCNC: 0.04 NMOL/L

## 2024-06-12 ENCOUNTER — PATIENT MESSAGE (OUTPATIENT)
Dept: ENDOCRINOLOGY | Facility: CLINIC | Age: 34
End: 2024-06-12
Payer: COMMERCIAL

## 2024-06-12 DIAGNOSIS — E11.65 TYPE 2 DIABETES MELLITUS WITH HYPERGLYCEMIA, UNSPECIFIED WHETHER LONG TERM INSULIN USE: Primary | ICD-10-CM

## 2024-06-12 RX ORDER — DULAGLUTIDE 0.75 MG/.5ML
0.75 INJECTION, SOLUTION SUBCUTANEOUS
Qty: 4 PEN | Refills: 3 | Status: SHIPPED | OUTPATIENT
Start: 2024-06-12

## 2024-06-12 NOTE — TELEPHONE ENCOUNTER
Discussed lab results with patient. C-peptide is normal but TERRIE antibody is elevated. Also reviewed recent CGM readings which indicate fairly well controlled glucoses - pt has been taking Novolog 6 units ac at times in addition to Lantus 10 units qhs.   Advised pt to continue Lantus 10 units and Novolog ss. Start Trulicity  0.75 mg weekly. Reviewed s/e. Reduce Lantus to 8 units if BG < 80s. Will consider weaning off Lantus at next visit. Call with any issues. Take Trulicity pens on vacation.   He voiced understanding.

## 2024-06-24 ENCOUNTER — TELEPHONE (OUTPATIENT)
Dept: FAMILY MEDICINE | Facility: CLINIC | Age: 34
End: 2024-06-24
Payer: COMMERCIAL

## 2024-07-15 ENCOUNTER — LAB VISIT (OUTPATIENT)
Dept: LAB | Facility: HOSPITAL | Age: 34
End: 2024-07-15
Attending: INTERNAL MEDICINE
Payer: COMMERCIAL

## 2024-07-15 DIAGNOSIS — Z11.4 SCREENING FOR HIV (HUMAN IMMUNODEFICIENCY VIRUS): ICD-10-CM

## 2024-07-15 DIAGNOSIS — Z79.4 TYPE 2 DIABETES MELLITUS WITH HYPERGLYCEMIA, WITH LONG-TERM CURRENT USE OF INSULIN: ICD-10-CM

## 2024-07-15 DIAGNOSIS — Z11.59 NEED FOR HEPATITIS C SCREENING TEST: ICD-10-CM

## 2024-07-15 DIAGNOSIS — E11.65 TYPE 2 DIABETES MELLITUS WITH HYPERGLYCEMIA, WITH LONG-TERM CURRENT USE OF INSULIN: ICD-10-CM

## 2024-07-15 PROBLEM — N17.9 AKI (ACUTE KIDNEY INJURY): Status: RESOLVED | Noted: 2024-04-15 | Resolved: 2024-07-15

## 2024-07-15 LAB
ALBUMIN SERPL BCP-MCNC: 4.1 G/DL (ref 3.5–5.2)
ALP SERPL-CCNC: 60 U/L (ref 55–135)
ALT SERPL W/O P-5'-P-CCNC: 10 U/L (ref 10–44)
ANION GAP SERPL CALC-SCNC: 13 MMOL/L (ref 8–16)
AST SERPL-CCNC: 14 U/L (ref 10–40)
BASOPHILS # BLD AUTO: 0.07 K/UL (ref 0–0.2)
BASOPHILS NFR BLD: 1.1 % (ref 0–1.9)
BILIRUB SERPL-MCNC: 0.4 MG/DL (ref 0.1–1)
BUN SERPL-MCNC: 22 MG/DL (ref 6–20)
CALCIUM SERPL-MCNC: 9.6 MG/DL (ref 8.7–10.5)
CHLORIDE SERPL-SCNC: 105 MMOL/L (ref 95–110)
CHOLEST SERPL-MCNC: 217 MG/DL (ref 120–199)
CHOLEST/HDLC SERPL: 3.8 {RATIO} (ref 2–5)
CO2 SERPL-SCNC: 23 MMOL/L (ref 23–29)
CREAT SERPL-MCNC: 1.4 MG/DL (ref 0.5–1.4)
DIFFERENTIAL METHOD BLD: ABNORMAL
EOSINOPHIL # BLD AUTO: 0.2 K/UL (ref 0–0.5)
EOSINOPHIL NFR BLD: 3.5 % (ref 0–8)
ERYTHROCYTE [DISTWIDTH] IN BLOOD BY AUTOMATED COUNT: 13.8 % (ref 11.5–14.5)
EST. GFR  (NO RACE VARIABLE): >60 ML/MIN/1.73 M^2
GLUCOSE SERPL-MCNC: 123 MG/DL (ref 70–110)
HCT VFR BLD AUTO: 45.8 % (ref 40–54)
HDLC SERPL-MCNC: 57 MG/DL (ref 40–75)
HDLC SERPL: 26.3 % (ref 20–50)
HGB BLD-MCNC: 14.2 G/DL (ref 14–18)
IMM GRANULOCYTES # BLD AUTO: 0.02 K/UL (ref 0–0.04)
IMM GRANULOCYTES NFR BLD AUTO: 0.3 % (ref 0–0.5)
LDLC SERPL CALC-MCNC: 133.6 MG/DL (ref 63–159)
LYMPHOCYTES # BLD AUTO: 3.1 K/UL (ref 1–4.8)
LYMPHOCYTES NFR BLD: 46.8 % (ref 18–48)
MCH RBC QN AUTO: 27.2 PG (ref 27–31)
MCHC RBC AUTO-ENTMCNC: 31 G/DL (ref 32–36)
MCV RBC AUTO: 88 FL (ref 82–98)
MONOCYTES # BLD AUTO: 0.3 K/UL (ref 0.3–1)
MONOCYTES NFR BLD: 5 % (ref 4–15)
NEUTROPHILS # BLD AUTO: 2.9 K/UL (ref 1.8–7.7)
NEUTROPHILS NFR BLD: 43.3 % (ref 38–73)
NONHDLC SERPL-MCNC: 160 MG/DL
NRBC BLD-RTO: 0 /100 WBC
PLATELET # BLD AUTO: 297 K/UL (ref 150–450)
PMV BLD AUTO: 11.8 FL (ref 9.2–12.9)
POTASSIUM SERPL-SCNC: 4 MMOL/L (ref 3.5–5.1)
PROT SERPL-MCNC: 7.8 G/DL (ref 6–8.4)
RBC # BLD AUTO: 5.22 M/UL (ref 4.6–6.2)
SODIUM SERPL-SCNC: 141 MMOL/L (ref 136–145)
TRIGL SERPL-MCNC: 132 MG/DL (ref 30–150)
WBC # BLD AUTO: 6.63 K/UL (ref 3.9–12.7)

## 2024-07-15 PROCEDURE — 86803 HEPATITIS C AB TEST: CPT | Performed by: INTERNAL MEDICINE

## 2024-07-15 PROCEDURE — 80053 COMPREHEN METABOLIC PANEL: CPT | Performed by: INTERNAL MEDICINE

## 2024-07-15 PROCEDURE — 80061 LIPID PANEL: CPT | Performed by: INTERNAL MEDICINE

## 2024-07-15 PROCEDURE — 85025 COMPLETE CBC W/AUTO DIFF WBC: CPT | Performed by: INTERNAL MEDICINE

## 2024-07-15 PROCEDURE — 87389 HIV-1 AG W/HIV-1&-2 AB AG IA: CPT | Performed by: INTERNAL MEDICINE

## 2024-07-15 PROCEDURE — 36415 COLL VENOUS BLD VENIPUNCTURE: CPT | Mod: PN | Performed by: INTERNAL MEDICINE

## 2024-07-16 LAB
HCV AB SERPL QL IA: NORMAL
HIV 1+2 AB+HIV1 P24 AG SERPL QL IA: NORMAL

## 2024-07-17 ENCOUNTER — LAB VISIT (OUTPATIENT)
Dept: LAB | Facility: HOSPITAL | Age: 34
End: 2024-07-17
Attending: NURSE PRACTITIONER
Payer: COMMERCIAL

## 2024-07-17 ENCOUNTER — OFFICE VISIT (OUTPATIENT)
Dept: ENDOCRINOLOGY | Facility: CLINIC | Age: 34
End: 2024-07-17
Payer: COMMERCIAL

## 2024-07-17 VITALS
HEART RATE: 89 BPM | WEIGHT: 232.19 LBS | OXYGEN SATURATION: 99 % | SYSTOLIC BLOOD PRESSURE: 100 MMHG | DIASTOLIC BLOOD PRESSURE: 70 MMHG | HEIGHT: 68 IN | BODY MASS INDEX: 35.19 KG/M2

## 2024-07-17 DIAGNOSIS — E66.01 SEVERE OBESITY (BMI 35.0-39.9) WITH COMORBIDITY: ICD-10-CM

## 2024-07-17 DIAGNOSIS — E11.65 TYPE 2 DIABETES MELLITUS WITH HYPERGLYCEMIA, UNSPECIFIED WHETHER LONG TERM INSULIN USE: ICD-10-CM

## 2024-07-17 DIAGNOSIS — E11.65 TYPE 2 DIABETES MELLITUS WITH HYPERGLYCEMIA, UNSPECIFIED WHETHER LONG TERM INSULIN USE: Primary | ICD-10-CM

## 2024-07-17 PROCEDURE — 3066F NEPHROPATHY DOC TX: CPT | Mod: CPTII,S$GLB,, | Performed by: NURSE PRACTITIONER

## 2024-07-17 PROCEDURE — 1159F MED LIST DOCD IN RCRD: CPT | Mod: CPTII,S$GLB,, | Performed by: NURSE PRACTITIONER

## 2024-07-17 PROCEDURE — 3074F SYST BP LT 130 MM HG: CPT | Mod: CPTII,S$GLB,, | Performed by: NURSE PRACTITIONER

## 2024-07-17 PROCEDURE — 99999 PR PBB SHADOW E&M-EST. PATIENT-LVL IV: CPT | Mod: PBBFAC,,, | Performed by: NURSE PRACTITIONER

## 2024-07-17 PROCEDURE — 99214 OFFICE O/P EST MOD 30 MIN: CPT | Mod: S$GLB,,, | Performed by: NURSE PRACTITIONER

## 2024-07-17 PROCEDURE — 1160F RVW MEDS BY RX/DR IN RCRD: CPT | Mod: CPTII,S$GLB,, | Performed by: NURSE PRACTITIONER

## 2024-07-17 PROCEDURE — 3046F HEMOGLOBIN A1C LEVEL >9.0%: CPT | Mod: CPTII,S$GLB,, | Performed by: NURSE PRACTITIONER

## 2024-07-17 PROCEDURE — 3078F DIAST BP <80 MM HG: CPT | Mod: CPTII,S$GLB,, | Performed by: NURSE PRACTITIONER

## 2024-07-17 PROCEDURE — 3061F NEG MICROALBUMINURIA REV: CPT | Mod: CPTII,S$GLB,, | Performed by: NURSE PRACTITIONER

## 2024-07-17 PROCEDURE — 3008F BODY MASS INDEX DOCD: CPT | Mod: CPTII,S$GLB,, | Performed by: NURSE PRACTITIONER

## 2024-07-17 PROCEDURE — 83036 HEMOGLOBIN GLYCOSYLATED A1C: CPT | Performed by: NURSE PRACTITIONER

## 2024-07-17 PROCEDURE — 36415 COLL VENOUS BLD VENIPUNCTURE: CPT | Performed by: NURSE PRACTITIONER

## 2024-07-17 RX ORDER — DULAGLUTIDE 0.75 MG/.5ML
0.75 INJECTION, SOLUTION SUBCUTANEOUS
Qty: 4 PEN | Refills: 3 | Status: SHIPPED | OUTPATIENT
Start: 2024-07-17

## 2024-07-17 RX ORDER — BLOOD-GLUCOSE SENSOR
EACH MISCELLANEOUS
Qty: 3 EACH | Refills: 11 | Status: SHIPPED | OUTPATIENT
Start: 2024-07-17

## 2024-07-17 RX ORDER — DULAGLUTIDE 0.75 MG/.5ML
0.75 INJECTION, SOLUTION SUBCUTANEOUS
Qty: 4 PEN | Refills: 3 | Status: SHIPPED | OUTPATIENT
Start: 2024-07-17 | End: 2024-07-17

## 2024-07-17 NOTE — PATIENT INSTRUCTIONS
Stop Lantus.   Inject Novolog with just sliding scale.   Start Trulicity once weekly.   Start exercise regimen - incorporate weight training.   Prescription sent for Dexcom G7 and Trulicity to [facility]    Return to clinic in 3 months with labs prior.   A1c today.

## 2024-07-17 NOTE — PROGRESS NOTES
CC: This 34 y.o. Black or  male  is here for evaluation of  T2DM along with comorbidities indicated in the Visit Diagnosis section of this encounter.    HPI: Roly Candelaria was diagnosed with T2DM in 2/2022 and glucose was 591 upon presentation. Metformin started but he stopped after a couple of months.  [facility] he was [facility] with DKA with blood glucose > 700 with ketones in urine.   C-peptide is normal at 2.11 with paired glucose of 115, but TERRIE antibody is elevated.     DM COMPLICATIONS: peripheral neuropathy      Initial visit 6/5/24  New to Endocrine. After hospital admission for DKA in April, he was dc'd on MDI. He has met with diabetes educator multiple times and felt visits were helpful.   He has not taken insulin for a week now. He was a out of town.   Pt started Dexcom G6 CGM about 2-3 weeks ago.   Plan Reviewed how to rotate insulin injection sites.   Labs today to rule out type 1 diabetes.   Until lab results are back, hold Novolog meal time dosing and reduce Lantus from 22 to 10 units once daily.   Inject Novolog as needed before meals are high: 150-200=+1, 201-250=+2, 251-300=+3; 301-350=+4, over 350=+5 units  If labs indicate patient has type 2 diabetes, will wean off Lantus and keep Novolog sliding scale. Discussed metformin versus Ozmepic  and pt prefers the latter d/t once weekly dosing.   Avoid pork skins as a snack d/t high saturated fat content. List of appropriate snacks provided.   Return to clinic in 3 months with A1c prior.     Interval hx  He was advised after lov to change to Novolog ss from fixed meal dosing and start Trulicity. He went on vacation for 3 weeks and did not want to start Trulicity because he didn't want to get sick out of town.   He skips Lantus most of the time, might take it 2x/week. Taking Novolog 6-8 units before meals.   He has gained 11 lb since lov.   Resumed walking yesterday.     LAST DIABETES EDUCATION: 5/2/24, 5/15/24, 5/29/24  "    HOSPITALIZED FOR DIABETES -  Yes - DKA 4/2024    SIGNIFICANT DIABETES MED HISTORY:   Previously on metformin 500 mg bid - tolerable     PRESCRIBED DIABETES MEDICATIONS:   Trulicity 0.75 mg weekly   Novolog ac with ss: 150-200=+1, 201-250=+2, 251-300=+3; 301-350=+4, over 350=+5 units  Lantus 10 units qhs     Misses medication doses - Yes -  lantus and  Trulicity as above.     Rotates injections: right and left sides of abdomen   Changes insulin pen needles: yes       SELF MONITORING BLOOD GLUCOSE: Dexcom G6 CGM demetria.     CGM interpretation:  glucoses overall at goal. Lowest BG d/t late Novolog dose pc to correct glucose. Some highs in the mid 200s. BGs in the last few days (after vacation) are lower.         HYPOGLYCEMIC EPISODES: none      CURRENT DIET: drinks SF vitamin water, SF Gatorade, water.   Eats 2 meals/day. 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     Snacks day/overnight - pork skins, nuts, or fruit, a bag of chips (18 gram CHO).     CURRENT EXERCISE: walks 1 mile at playground and jogs 1/2 mile, 2x/week.     SOCIAL: Has 3 kids - 10 yo twins and 8 yo son     /70 (BP Location: Left arm, Patient Position: Sitting, BP Method: X-Large (Manual))   Pulse 89   Ht 5' 8" (1.727 m)   Wt 105.3 kg (232 lb 3.2 oz)   SpO2 99%   BMI 35.31 kg/m²     ROS:   CONSTITUTIONAL: Appetite good, denies fatigue   GI: No nausea, vomiting, constipation, or diarrhea   NEURO: +  paresthesias to feet     PHYSICAL EXAM:  GENERAL: Well developed, well nourished. No acute distress.   PSYCH: AAOx3, appropriate mood and affect, conversant, well-groomed. Judgement and insight good.   NEURO: Cranial nerves grossly intact. Speech clear.   CHEST: Respirations even and unlabored.        Hemoglobin A1C   Date Value Ref Range Status   04/15/2024 >14.0 (H) 4.0 - 5.6 % Final     Comment:     ADA Screening Guidelines:  5.7-6.4%  Consistent with prediabetes  >or=6.5%  Consistent with diabetes    High levels of " fetal hemoglobin interfere with the HbA1C  assay. Heterozygous hemoglobin variants (HbS, HgC, etc)do  not significantly interfere with this assay.   However, presence of multiple variants may affect accuracy.     04/14/2024 >14.0 (H) 4.0 - 5.6 % Final     Comment:     ADA Screening Guidelines:  5.7-6.4%  Consistent with prediabetes  >or=6.5%  Consistent with diabetes    High levels of fetal hemoglobin interfere with the HbA1C  assay. Heterozygous hemoglobin variants (HbS, HgC, etc)do  not significantly interfere with this assay.   However, presence of multiple variants may affect accuracy.         Lab Results   Component Value Date    CPEPTIDE 2.11 06/05/2024    CPEPTIDE 2.11 06/05/2024    GLUTAMICACID 0.04 (H) 06/05/2024    ISLETCELLANT <1:4 06/05/2024    ISLETCELLANT <1:4 06/05/2024       Latest Reference Range & Units Most Recent   Glucose, Fasting 70 - 110 mg/dL 115 (H)  6/5/24 12:18     Lab Results   Component Value Date    CHOL 217 (H) 07/15/2024     Lab Results   Component Value Date    HDL 57 07/15/2024     Lab Results   Component Value Date    LDLCALC 133.6 07/15/2024     Lab Results   Component Value Date    TRIG 132 07/15/2024     Lab Results   Component Value Date    CHOLHDL 26.3 07/15/2024           Component Value Date/Time     07/15/2024 0953    K 4.0 07/15/2024 0953     07/15/2024 0953    CO2 23 07/15/2024 0953    BUN 22 (H) 07/15/2024 0953    CREATININE 1.4 07/15/2024 0953     (H) 07/15/2024 0953    CALCIUM 9.6 07/15/2024 0953    ALKPHOS 60 07/15/2024 0953    AST 14 07/15/2024 0953    ALT 10 07/15/2024 0953    BILITOT 0.4 07/15/2024 0953    EGFRNORACEVR >60 07/15/2024 0953    ESTGFRAFRICA >60 02/16/2022 1003         Lab Results   Component Value Date    LABMICR <5.0 05/15/2024    CREATRANDUR 124.0 05/15/2024    MICALBCREAT Unable to calculate 05/15/2024                  ASSESSMENT and PLAN:    A1C GOAL: < 7%     1. Type 2 diabetes mellitus with hyperglycemia, unspecified whether  long term insulin use  Stop Lantus.   Inject Novolog with just sliding scale.   Start Trulicity once weekly.   Start exercise regimen - incorporate weight training.   Prescription sent for Dexcom G7 and Trulicity to [facility]    Return to clinic in 3 months with labs prior.   A1c today.     Hemoglobin A1C      2. Severe obesity (BMI 35.0-39.9) with comorbidity                 Orders Placed This Encounter   Procedures    Hemoglobin A1C     Standing Status:   Future     Number of Occurrences:   1     Standing Expiration Date:   9/15/2025        Follow up in about 3 months (around 10/17/2024).

## 2024-07-18 LAB
ESTIMATED AVG GLUCOSE: 166 MG/DL (ref 68–131)
HBA1C MFR BLD: 7.4 % (ref 4–5.6)

## 2024-10-14 ENCOUNTER — LAB VISIT (OUTPATIENT)
Dept: LAB | Facility: HOSPITAL | Age: 34
End: 2024-10-14
Attending: NURSE PRACTITIONER
Payer: COMMERCIAL

## 2024-10-14 DIAGNOSIS — E11.65 TYPE 2 DIABETES MELLITUS WITH HYPERGLYCEMIA, UNSPECIFIED WHETHER LONG TERM INSULIN USE: ICD-10-CM

## 2024-10-14 LAB
C PEPTIDE SERPL-MCNC: 3.18 NG/ML (ref 0.78–5.19)
ESTIMATED AVG GLUCOSE: 146 MG/DL (ref 68–131)
GLUCOSE SERPL-MCNC: 114 MG/DL (ref 70–110)
HBA1C MFR BLD: 6.7 % (ref 4–5.6)

## 2024-10-14 PROCEDURE — 84681 ASSAY OF C-PEPTIDE: CPT | Performed by: NURSE PRACTITIONER

## 2024-10-14 PROCEDURE — 83036 HEMOGLOBIN GLYCOSYLATED A1C: CPT | Performed by: NURSE PRACTITIONER

## 2024-10-14 PROCEDURE — 36415 COLL VENOUS BLD VENIPUNCTURE: CPT | Mod: PN | Performed by: NURSE PRACTITIONER

## 2024-10-14 PROCEDURE — 86341 ISLET CELL ANTIBODY: CPT | Performed by: NURSE PRACTITIONER

## 2024-10-14 PROCEDURE — 82947 ASSAY GLUCOSE BLOOD QUANT: CPT | Performed by: NURSE PRACTITIONER

## 2025-04-15 ENCOUNTER — PATIENT OUTREACH (OUTPATIENT)
Dept: ADMINISTRATIVE | Facility: HOSPITAL | Age: 35
End: 2025-04-15
Payer: COMMERCIAL

## 2025-04-15 DIAGNOSIS — E11.65 TYPE 2 DIABETES MELLITUS WITH HYPERGLYCEMIA, WITHOUT LONG-TERM CURRENT USE OF INSULIN: Primary | ICD-10-CM

## 2025-04-15 NOTE — PROGRESS NOTES
Portal message sent out to pt in regards to scheduling an eye exam. Immunization's updated/triggered. Gap report updated.

## 2025-05-23 NOTE — ASSESSMENT & PLAN NOTE
Patient with acute kidney injury/acute renal failure likely due to pre-renal azotemia due to IVVD in the setting of osmotic diuresis.  ANA is currently stable. Baseline creatinine  1 years we will every day was pain  - Labs reviewed- Renal function/electrolytes with Estimated Creatinine Clearance: 105.1 mL/min (based on SCr of 1.1 mg/dL). according to latest data.   Monitor urine output and serial BMP and adjust therapy as needed.   Avoid nephrotoxins and renally dose meds for GFR listed above.  Resolved   Reviewed- thank you

## 2025-06-23 RX ORDER — DULAGLUTIDE 0.75 MG/.5ML
0.75 INJECTION, SOLUTION SUBCUTANEOUS
Qty: 4 PEN | Refills: 3 | Status: CANCELLED | OUTPATIENT
Start: 2025-06-23

## 2025-06-24 ENCOUNTER — TELEPHONE (OUTPATIENT)
Dept: ENDOCRINOLOGY | Facility: CLINIC | Age: 35
End: 2025-06-24
Payer: COMMERCIAL

## 2025-06-24 NOTE — TELEPHONE ENCOUNTER
Spoke with patient. Patient stated he will call the clinic at [phone] when ready to schedule a follow up appointment.

## 2025-06-24 NOTE — TELEPHONE ENCOUNTER
Copied from CRM #[id number]. Topic: General Inquiry - Return Call  >> Jun 24, 2025  2:27 PM Khushbu wrote:  Type:  Patient Returning Call    Who Called: self     Who Left Message for Patient: [place]    Does the patient know what this is regarding?:yes     Would the patient rather a call back or a response via [facility]? call    Best Call Back Number:[phone]    Additional Information:

## 2025-06-24 NOTE — TELEPHONE ENCOUNTER
Called patient, no answer, voicemail left regarding scheduling follow up appointment with Triny Morocho NP with lab prior.

## 2025-07-23 DIAGNOSIS — E11.65 TYPE 2 DIABETES MELLITUS WITH HYPERGLYCEMIA: ICD-10-CM

## 2025-08-01 RX ORDER — BLOOD-GLUCOSE SENSOR
EACH MISCELLANEOUS
Qty: 3 EACH | Refills: 11 | Status: SHIPPED | OUTPATIENT
Start: 2025-08-01

## 2025-08-12 ENCOUNTER — LAB VISIT (OUTPATIENT)
Dept: LAB | Facility: HOSPITAL | Age: 35
End: 2025-08-12
Attending: NURSE PRACTITIONER
Payer: COMMERCIAL

## 2025-08-12 ENCOUNTER — OFFICE VISIT (OUTPATIENT)
Dept: ENDOCRINOLOGY | Facility: CLINIC | Age: 35
End: 2025-08-12
Payer: COMMERCIAL

## 2025-08-12 VITALS
OXYGEN SATURATION: 97 % | SYSTOLIC BLOOD PRESSURE: 116 MMHG | BODY MASS INDEX: 36.28 KG/M2 | HEIGHT: 68 IN | HEART RATE: 83 BPM | DIASTOLIC BLOOD PRESSURE: 75 MMHG | WEIGHT: 239.38 LBS | RESPIRATION RATE: 16 BRPM

## 2025-08-12 DIAGNOSIS — E11.65 TYPE 2 DIABETES MELLITUS WITH HYPERGLYCEMIA, UNSPECIFIED WHETHER LONG TERM INSULIN USE: Primary | ICD-10-CM

## 2025-08-12 DIAGNOSIS — E66.01 SEVERE OBESITY (BMI 35.0-39.9) WITH COMORBIDITY: ICD-10-CM

## 2025-08-12 DIAGNOSIS — E11.65 TYPE 2 DIABETES MELLITUS WITH HYPERGLYCEMIA, UNSPECIFIED WHETHER LONG TERM INSULIN USE: ICD-10-CM

## 2025-08-12 PROCEDURE — 99999 PR PBB SHADOW E&M-EST. PATIENT-LVL V: CPT | Mod: PBBFAC,,, | Performed by: NURSE PRACTITIONER

## 2025-08-12 PROCEDURE — 36415 COLL VENOUS BLD VENIPUNCTURE: CPT

## 2025-08-12 PROCEDURE — 3072F LOW RISK FOR RETINOPATHY: CPT | Mod: CPTII,S$GLB,, | Performed by: NURSE PRACTITIONER

## 2025-08-12 PROCEDURE — 1160F RVW MEDS BY RX/DR IN RCRD: CPT | Mod: CPTII,S$GLB,, | Performed by: NURSE PRACTITIONER

## 2025-08-12 PROCEDURE — 99214 OFFICE O/P EST MOD 30 MIN: CPT | Mod: S$GLB,,, | Performed by: NURSE PRACTITIONER

## 2025-08-12 PROCEDURE — 3008F BODY MASS INDEX DOCD: CPT | Mod: CPTII,S$GLB,, | Performed by: NURSE PRACTITIONER

## 2025-08-12 PROCEDURE — 3078F DIAST BP <80 MM HG: CPT | Mod: CPTII,S$GLB,, | Performed by: NURSE PRACTITIONER

## 2025-08-12 PROCEDURE — 3074F SYST BP LT 130 MM HG: CPT | Mod: CPTII,S$GLB,, | Performed by: NURSE PRACTITIONER

## 2025-08-12 PROCEDURE — 95251 CONT GLUC MNTR ANALYSIS I&R: CPT | Mod: S$GLB,,, | Performed by: NURSE PRACTITIONER

## 2025-08-12 PROCEDURE — 83036 HEMOGLOBIN GLYCOSYLATED A1C: CPT

## 2025-08-12 PROCEDURE — G2211 COMPLEX E/M VISIT ADD ON: HCPCS | Mod: S$GLB,,, | Performed by: NURSE PRACTITIONER

## 2025-08-12 PROCEDURE — 1159F MED LIST DOCD IN RCRD: CPT | Mod: CPTII,S$GLB,, | Performed by: NURSE PRACTITIONER

## 2025-08-12 RX ORDER — DULAGLUTIDE 0.75 MG/.5ML
0.75 INJECTION, SOLUTION SUBCUTANEOUS
Qty: 4 PEN | Refills: 0 | Status: SHIPPED | OUTPATIENT
Start: 2025-08-12

## 2025-08-12 RX ORDER — DULAGLUTIDE 1.5 MG/.5ML
1.5 INJECTION, SOLUTION SUBCUTANEOUS
Qty: 4 PEN | Refills: 1 | Status: SHIPPED | OUTPATIENT
Start: 2025-08-12

## 2025-08-13 LAB
EAG (OHS): 186 MG/DL (ref 68–131)
HBA1C MFR BLD: 8.1 % (ref 4–5.6)